# Patient Record
Sex: FEMALE | Race: WHITE | NOT HISPANIC OR LATINO | Employment: FULL TIME | URBAN - METROPOLITAN AREA
[De-identification: names, ages, dates, MRNs, and addresses within clinical notes are randomized per-mention and may not be internally consistent; named-entity substitution may affect disease eponyms.]

---

## 2017-07-07 ENCOUNTER — TRANSFERRED RECORDS (OUTPATIENT)
Dept: HEALTH INFORMATION MANAGEMENT | Facility: CLINIC | Age: 42
End: 2017-07-07

## 2017-09-05 ENCOUNTER — TRANSFERRED RECORDS (OUTPATIENT)
Dept: HEALTH INFORMATION MANAGEMENT | Facility: CLINIC | Age: 42
End: 2017-09-05

## 2017-09-12 ENCOUNTER — TRANSFERRED RECORDS (OUTPATIENT)
Dept: HEALTH INFORMATION MANAGEMENT | Facility: CLINIC | Age: 42
End: 2017-09-12

## 2017-10-05 ENCOUNTER — TELEPHONE (OUTPATIENT)
Dept: OPHTHALMOLOGY | Facility: CLINIC | Age: 42
End: 2017-10-05

## 2017-10-05 DIAGNOSIS — H53.2 DOUBLE VISION: Primary | ICD-10-CM

## 2017-10-06 ENCOUNTER — OFFICE VISIT (OUTPATIENT)
Dept: OPHTHALMOLOGY | Facility: CLINIC | Age: 42
End: 2017-10-06
Attending: OPHTHALMOLOGY
Payer: COMMERCIAL

## 2017-10-06 DIAGNOSIS — H53.2 DOUBLE VISION: ICD-10-CM

## 2017-10-06 DIAGNOSIS — H53.10 SUBJECTIVE VISUAL DISTURBANCE: Primary | ICD-10-CM

## 2017-10-06 DIAGNOSIS — G43.109 COMPLICATED MIGRAINE: ICD-10-CM

## 2017-10-06 PROCEDURE — 92015 DETERMINE REFRACTIVE STATE: CPT | Mod: ZF

## 2017-10-06 PROCEDURE — 99214 OFFICE O/P EST MOD 30 MIN: CPT

## 2017-10-06 RX ORDER — LISINOPRIL 10 MG/1
10 TABLET ORAL
COMMUNITY
Start: 2013-02-28 | End: 2022-11-10

## 2017-10-06 RX ORDER — BUSPIRONE HYDROCHLORIDE 15 MG/1
15 TABLET ORAL 3 TIMES DAILY
COMMUNITY
Start: 2013-11-05

## 2017-10-06 RX ORDER — FLUCONAZOLE 200 MG/1
200 TABLET ORAL
COMMUNITY
Start: 2017-04-13 | End: 2022-11-10

## 2017-10-06 RX ORDER — LAMOTRIGINE 100 MG/1
100 TABLET ORAL
COMMUNITY
Start: 2013-02-28 | End: 2020-01-28

## 2017-10-06 RX ORDER — LIDOCAINE 50 MG/G
1 PATCH TOPICAL DAILY PRN
COMMUNITY
Start: 2011-01-03

## 2017-10-06 RX ORDER — CYCLOBENZAPRINE HCL 10 MG
TABLET ORAL
COMMUNITY
Start: 2009-09-14 | End: 2020-01-28

## 2017-10-06 RX ORDER — METOPROLOL TARTRATE 25 MG/1
25 TABLET, FILM COATED ORAL 2 TIMES DAILY
COMMUNITY
Start: 2009-09-14

## 2017-10-06 ASSESSMENT — REFRACTION_MANIFEST
OS_AXIS: 032
OD_CYLINDER: +1.75
OD_SPHERE: -3.75
OD_AXIS: 135
OS_CYLINDER: +1.75
OS_SPHERE: -3.50

## 2017-10-06 ASSESSMENT — VISUAL ACUITY
METHOD: SNELLEN - LINEAR
CORRECTION_TYPE: GLASSES
OD_CC: 20/25
OS_CC: 20/20

## 2017-10-06 ASSESSMENT — REFRACTION_WEARINGRX
OS_CYLINDER: +2.00
OS_AXIS: 037
OD_AXIS: 144
OD_CYLINDER: +2.25
OD_SPHERE: -3.50
OS_SPHERE: -3.50

## 2017-10-06 ASSESSMENT — EXTERNAL EXAM - RIGHT EYE: OD_EXAM: NORMAL

## 2017-10-06 ASSESSMENT — CONF VISUAL FIELD
OD_NORMAL: 1
OS_NORMAL: 1

## 2017-10-06 ASSESSMENT — TONOMETRY
OS_IOP_MMHG: 8
IOP_METHOD: TONOPEN
OD_IOP_MMHG: 8

## 2017-10-06 ASSESSMENT — CUP TO DISC RATIO
OD_RATIO: 0.4
OS_RATIO: 0.4

## 2017-10-06 ASSESSMENT — SLIT LAMP EXAM - LIDS
COMMENTS: NORMAL
COMMENTS: NORMAL

## 2017-10-06 ASSESSMENT — EXTERNAL EXAM - LEFT EYE: OS_EXAM: NORMAL

## 2017-10-06 NOTE — MR AVS SNAPSHOT
After Visit Summary   10/6/2017    Leola Uriostegui    MRN: [id number]           Patient Information     Date Of Birth          1975        Visit Information        Provider Department      10/6/2017 9:00 AM Bhaskar Wheeler [facility]        Today's Diagnoses     Complicated migraine    -  1    Double vision           Follow-ups after your visit        Additional Services     NEUROLOGY ADULT REFERRAL       Your provider has referred you for the following:   Consult at [facility]: Neurology Clinic [facility] [phone]   [email or web address]  General Neurology    Please be aware that coverage of these services is subject to the terms and limitations of your health insurance plan.  Call member services at your health plan with any benefit or coverage questions.      Please bring the following with you to your appointment:    (1) Any X-Rays, CTs or MRIs which have been performed.  Contact the facility where they were done to arrange for  prior to your scheduled appointment.    (2) List of current medications  (3) This referral request   (4) Any documents/labs given to you for this referral                  Your next 10 appointments already scheduled     Oct 13, 2017  9:30 AM CDT   (Arrive by 9:15 AM)   New Patient Visit with BA Lowe [facility] Neurology ([facility])    [address] [place] [id number] [phone]              Future tests that were ordered for you today     Open Future Orders        Priority Expected Expires Ordered    Sensorimotor Routine  12/4/2017 10/5/2017            Who to contact     Please call your clinic at [phone] to:    Ask questions about your health    Make or cancel appointments    Discuss your medicines    Learn about your test results    Speak to your doctor   If you have compliments or concerns about an experience at your  clinic, or if you wish to file a complaint, please contact [facility] Patient Relations at [phone] or email us at [email or web address]         Additional Information About Your Visit        gopogo Information     gopogo is an electronic gateway that provides easy, online access to your medical records. With gopogo, you can request a clinic appointment, read your test results, renew a prescription or communicate with your care team.     To sign up for gopogo visit the website at [email or web address]   You will be asked to enter the access code listed below, as well as some personal information. Please follow the directions to create your username and password.     Your access code is: [email or web address]  Expires: 2017  6:30 AM     Your access code will  in 90 days. If you need help or a new code, please contact your [facility] or call [phone] for assistance.        Care EveryWhere ID     This is your Care EveryWhere ID. This could be used by other organizations to access your [facility] medical records  [id number]         Blood Pressure from Last 3 Encounters:   No data found for BP    Weight from Last 3 Encounters:   No data found for Wt              We Performed the Following     IOP Measurement     NEUROLOGY ADULT REFERRAL        Primary Care Provider Office Phone # Fax #    Vandana ANDRADE JASON Kearns [phone]       [facility] [place] [address]  [place] [address]        Equal Access to Services     [facility] SAGAR : Hadii aad ku hadasho Soomaali, waaxda luqadaha, qaybta kaalmada adeegyada, radha rosas . So [facility] [phone].    ATENCIÓN: Si habla español, tiene a villa disposición servicios gratuitos de asistencia lingüística. Llame al [phone].    We comply with applicable federal civil rights laws and [place] laws. We do not discriminate on the basis of  race, color, national origin, age, disability, sex, sexual orientation, or gender identity.            Thank you!     Thank you for choosing EYE CLINIC  for your care. Our goal is always to provide you with excellent care. Hearing back from our patients is one way we can continue to improve our services. Please take a few minutes to complete the written survey that you may receive in the mail after your visit with us. Thank you!             Your Updated Medication List - Protect others around you: Learn how to safely use, store and throw away your medicines at www.disposemymeds.org.          This list is accurate as of: 10/6/17 11:18 AM.  Always use your most recent med list.                   Brand Name Dispense Instructions for use Diagnosis    ALBUTEROL IN      Inhale 1-2 puffs into the lungs        busPIRone 15 MG tablet    BUSPAR     Take 15 mg by mouth        cyclobenzaprine 10 MG tablet    FLEXERIL          fluconazole 200 MG tablet    DIFLUCAN     Take 200 mg by mouth        lamoTRIgine 100 MG tablet    LaMICtal     Take 100 mg by mouth        lidocaine 5 % Patch    LIDODERM     Apply 1 patch topically        lisinopril 10 MG tablet    PRINIVIL/ZESTRIL     Take 10 mg by mouth        metoprolol 25 MG tablet    LOPRESSOR          MULTIVITAMIN ADULT PO           PREMPHASE Tabs per tablet   Generic drug:  conjugated estrogens-medroxyprogesteron

## 2017-10-06 NOTE — PROGRESS NOTES
"ATTENDING ASSESSMENT AND PLAN:       1. Atypical migraine visual aura  2. Complex migraines    Leola Uriostegui is a pleasant 42 year old White female with a history of bipolar disorder who presents to my neuro-ophthalmology clinic today for evaluation of migraines. She reports onset of symptoms in 2010. She denies ever having headache during these episodes but does report having blurred vision, balance problems, garbled speech, poor motor control, and dilated pupils in both eyes. She doesn't always remember the episodes. In 2010, she only had one episode of these symptoms which reportedly lasted all day. She reports having an MRI at that time which showed mild nonspecific T2 and FLAIR signal hyperintensity in white matter of both cerebral hemispheres. She was seen by a neurologist, who reportedly told her that the spots were normal for her age. She has a history of biopar disorder and the neurologist switched her Seroquel, Lamictal, and premphase from daytime to nighttime dosing, but the patient feels that this didn't help.    By 2012, the episodes had increased in frequency to about twice a month and were only brief episodes. She had another MRI which showed unchanged foci of T2/FLAIR signal hyperintensity in the periventricular and subcortical white matter of the frontal lobes bilaterally.     By 2016, she reports having five episodes in one month, which prompted her to present to neurology for re-evaluation. She had a third MRI in 2/2016 which showed stable few scattered patchy foci of T2 signal within the white matter. She was started on topamax in 3/2016 by her PCP. After starting the topamax, she had no further episodes until recently. She had a \"bad episode\" 5 weeks ago during which her symptoms reportedly lasted 2 days and a short episode 3 weeks ago lasting 20 minutes.     The episodes tend to start in the beginning of the day but she otherwise denies any known trigger. She does get photophobia with the " episodes. She typically does not have nausea, but she did have emesis during the episode 5 weeks ago. The vision gets blurry in both eyes and does not improve with closing one eye. She denies flashing lights or positive visual phenomena. She denies eye pain. The vision improves back to baseline after the episodes.     She denies any other medical problems aside from bipolar disorder. She denies any recent medication changes. She denies any other focal neurological symptoms between episodes.    She denies any family history of migraines, MS, or other neurological problems. There is a history of adult onset diabetes mellitus in her family.      Her past ocular history is significant for childhood strabismus s/p bilateral bilateral lateral rectus recession in 2007. Otherwise, she wears glasses and denies any other eye problems.     She is still taking Topamax 100 mg at bedtime. She no longer sees a neurologist as didn't feel they were helpful in the past. She has not had electroencephalography in past.     Best corrected visual acuity was 20/20 in both eyes, color vision was full in both eyes, and confrontational visual fields were full in both eyes. Pupils were large, round, and sluggishly reactive to light with no afferent pupillary defect. Motility was full and alignment was ortho. Slit lamp exam was unremarkable. Dilated fundus exam showed healthy-appearing optic nerves in both eyes. Cranial nerves V1-3, 7,8,9,11, and 12 were normal bilaterally.     MRI brain without contrast on 12/28/10 showed mild nonspecific T2 and FLAIR signal hyperintensity in white matter of both cerebral hemispheres. MRI brain without contrast on 11/19/12 showed few unchanged non-specific scattered foci of T2/FLAIR signal hyperintensity in the periventricular and subcortical white matter of the frontal lobes bilaterally. CT head without contrast on 4/25/15 showed no intracranial pathology. MRI brain without contrast on 2/26/16 showed stable  few scattered patchy foci of T2 signal within the white matter consistent with small-vessel ischemic changes or sequela of migraine headache. Carotid ultrasound on 5/14/16 showed no hemodynamically significant stenosis and vertebral arteries antegrade bilaterally.     Overall, the patient is a 42 year old female with history of bipolar disorder who presents for evaluation of transient episodes of neurological symptoms including blurred vision, balance problems, garbled speech, poor motor control, and dilated pupils. She often does not remember the episodes. She reports onset of rare episodes in 12/2010 and increased freqency of episodes up until 2/2016, when she was started on topamax. She estimates having a total of 15 episodes since onset. The topamax was helping until about 2 months ago, when the episodes returned. She has history of bipolar disorder for which she takes seroquil, lamictal, and premphase with no recent change in medications or dosing.  On my review of her 2016 MRI images these do not look typical of demyelinating disease and look more typical of microvascular changes or migraine.     Her symptoms are concerning for complex migraine vs seizure. Her description of the duration of the episodes makes seizure less likely. Her MRI appearance and history does not sound compatible with multiple sclerosis. Her symptoms are most consistent with complex migraine and dilated pupils are common in patients in the midst of migraine. She currently continues on topamax 100mg at bedtime.     The patient requested referral to general neurology for further evaluation and consideration of alternative pharmacologic migraine prophylaxis. Depending on their degree of concern for seizure, they may consider electroencephalography testing though seizure seems highly unlikely to me.    I did not make a follow-up appointment, but I would be happy to see the patient back in the future should any new neuro-ophthalmic concern  arise.       I spent a total of 60 minutes face to face with Leola Uriostegui during today's office visit.  Over 50% of this time was spent counseling the patient and/or coordinating care regarding her blurry vision episodes.    Complete documentation of historical and exam elements from today's encounter can be found in the full encounter summary report (not reduplicated in this progress note).  I personally obtained the chief complaint(s) and history of present illness.  I confirmed and edited as necessary the review of systems, past medical/surgical history, family history, social history, and examination findings as documented by others; and I examined the patient myself.  I personally reviewed the relevant tests, images, and reports as documented above.  I formulated and edited as necessary the assessment and plan and discussed the findings and management plan with the patient and family   Bhaskar Wheeler MD

## 2017-10-06 NOTE — NURSING NOTE
Chief Complaints and History of Present Illnesses   Patient presents with     Neurologic Problem     migraines     HPI    Symptoms:              Comments:  Leola is a 42 year old female presenting with a history of:    1. Migraines  - last migraine was 3 weeks ago. No timeline. Some last 5 minutes, others last two days.   - the migraine she had 5 weeks ago was severe enough to cause:         - blurred vision        - balance problems        - Speech issues         - dilated pupils        - but no pain in eyes    - no diplopia.   - brought discs for MRI.     Trung ANDERS 9:00 AM October 6, 2017

## 2017-10-10 NOTE — TELEPHONE ENCOUNTER
APPT INFORMATION    Date & Time: 10/13/17, 9:30AM   Reason for Appt: Migraines    Referring Name/Clinic: [facility]    Yes / No COMMENT / NOTES DATE & ACTION   Patient Contacted ?   yes  LVM asking pt to call back, need to know if there are records to gather. 10/10/17, LVM    yes Pt called back and she states she was seen at [facility], and [facility]. 10/10/17, emailed [facility]       RECORDS CLINIC NAME  (use N/A if no records ) DATE & ACTION RECEIVED RECS & IMG? Y/N   (may include other helpful notes)   External Clinics: [facility] 10/10/17, emailed [facility] to [email or web address]- [email or web address]  10/12/17- faxed [facility] Received     [facility] 10/10/17, emailed [facility]O to [email or web address]  10/12/17- faxed [facility] Received     [facility]  Faxed cover sheet, requesting imaging Received    Internal Clinics: [facility]

## 2017-10-12 NOTE — TELEPHONE ENCOUNTER
Records Received From:  [facility]      Date / Exam / Location   (*specify location only if different than the sending clinic) COMMENTS / MISSING  (be specific)   Office Notes:  4/28/15, 10/15/15, 5/13/16, 4/6/17, 7/7/17, 8/10/17, 9/5/17 9/27/17 office notes?   Radiology Reports:  (use exam date)  CT head brain 4/25/15  MR head brain 2/26/16  US carotid duplex bilateral 5/15/16  MR left shoulder 10/24/16  MR lumbar 10/31/16  CT facial bones 7/7/17 Ortho Appt: Was Isidra Dickinson Notified (Y/N) -    ED/Hospital Notes:  4/25/15    Other:  labs

## 2017-10-13 ENCOUNTER — PRE VISIT (OUTPATIENT)
Dept: NEUROLOGY | Facility: CLINIC | Age: 42
End: 2017-10-13

## 2017-10-16 NOTE — TELEPHONE ENCOUNTER
Records Received From:  [facility]     Date/Exam/Location  (specify location if different)   Office Notes:  12/11/15   ED/Hosp Notes:  6/30/15- [facility], 4/25/15- [facility]    Radiology Reports: CT head brain 4[facility]   Xray cervical 4/25/15- [facility]

## 2017-10-19 NOTE — TELEPHONE ENCOUNTER
Records Received From:  [facility]     Date/Exam/Location  (specify location if different)   Office Notes:  1/13/11

## 2019-10-02 ENCOUNTER — PRE VISIT (OUTPATIENT)
Dept: NEUROLOGY | Facility: CLINIC | Age: 44
End: 2019-10-02

## 2019-10-02 NOTE — TELEPHONE ENCOUNTER
FUTURE VISIT INFORMATION      FUTURE VISIT INFORMATION:    Date: 11/19/2019    Time: 1030AM    Location: [facility]  REFERRAL INFORMATION:    Referring provider:  Self    Referring providers clinic:      Reason for visit/diagnosis  Headaches     RECORDS REQUESTED FROM:       Clinic name Comments Records Status Imaging Status   [facility]  Care Everywhere N/A

## 2019-12-16 ENCOUNTER — HEALTH MAINTENANCE LETTER (OUTPATIENT)
Age: 44
End: 2019-12-16

## 2020-01-28 ENCOUNTER — OFFICE VISIT (OUTPATIENT)
Dept: NEUROLOGY | Facility: CLINIC | Age: 45
End: 2020-01-28
Payer: COMMERCIAL

## 2020-01-28 VITALS
SYSTOLIC BLOOD PRESSURE: 101 MMHG | DIASTOLIC BLOOD PRESSURE: 71 MMHG | OXYGEN SATURATION: 100 % | RESPIRATION RATE: 16 BRPM | HEART RATE: 77 BPM

## 2020-01-28 DIAGNOSIS — R46.89 SPELL OF ABNORMAL BEHAVIOR: ICD-10-CM

## 2020-01-28 DIAGNOSIS — G43.109 MIGRAINE WITH AURA AND WITHOUT STATUS MIGRAINOSUS, NOT INTRACTABLE: Primary | ICD-10-CM

## 2020-01-28 PROBLEM — G43.519 INTRACTABLE PERSISTENT MIGRAINE AURA WITHOUT CEREBRAL INFARCTION AND WITHOUT STATUS MIGRAINOSUS: Status: ACTIVE | Noted: 2019-10-07

## 2020-01-28 PROBLEM — R19.7 NAUSEA VOMITING AND DIARRHEA: Status: ACTIVE | Noted: 2019-08-05

## 2020-01-28 PROBLEM — R53.1 LEFT-SIDED WEAKNESS: Status: ACTIVE | Noted: 2018-03-14

## 2020-01-28 PROBLEM — R55 SYNCOPE: Status: ACTIVE | Noted: 2017-10-12

## 2020-01-28 PROBLEM — Z79.899 CONTROLLED SUBSTANCE AGREEMENT SIGNED: Status: ACTIVE | Noted: 2017-03-28

## 2020-01-28 PROBLEM — H53.8 BLURRING OF VISUAL IMAGE: Status: ACTIVE | Noted: 2018-03-08

## 2020-01-28 PROBLEM — M25.512 ACUTE PAIN OF LEFT SHOULDER: Status: ACTIVE | Noted: 2020-01-28

## 2020-01-28 PROBLEM — G43.B0 OPHTHALMOPLEGIC MIGRAINE: Status: ACTIVE | Noted: 2017-10-09

## 2020-01-28 PROBLEM — G89.29 CHRONIC PAIN: Status: ACTIVE | Noted: 2018-03-14

## 2020-01-28 PROBLEM — K43.0 INCISIONAL HERNIA WITH OBSTRUCTION BUT NO GANGRENE: Status: ACTIVE | Noted: 2020-01-28

## 2020-01-28 PROBLEM — Z82.49 FAMILY HISTORY OF ISCHEMIC HEART DISEASE: Status: ACTIVE | Noted: 2020-01-28

## 2020-01-28 PROBLEM — R19.7 DIARRHEA, UNSPECIFIED: Status: ACTIVE | Noted: 2019-08-11

## 2020-01-28 PROBLEM — R11.2 NAUSEA VOMITING AND DIARRHEA: Status: ACTIVE | Noted: 2019-08-05
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
## 2020-01-28 PROBLEM — K92.2 GASTROINTESTINAL HEMORRHAGE: Status: ACTIVE | Noted: 2020-01-28

## 2020-01-28 PROBLEM — M21.372 LEFT FOOT DROP: Status: ACTIVE | Noted: 2018-03-08

## 2020-01-28 RX ORDER — TOPIRAMATE 100 MG/1
100 TABLET, FILM COATED ORAL AT BEDTIME
Qty: 30 TABLET | Refills: 11 | Status: SHIPPED | OUTPATIENT
Start: 2020-01-28 | End: 2021-11-04

## 2020-01-28 RX ORDER — ATOMOXETINE 80 MG/1
80 CAPSULE ORAL DAILY
COMMUNITY
Start: 2020-01-06
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
## 2020-01-28 NOTE — NURSING NOTE
Chief Complaint   Patient presents with     Consult     P NEW CONSULTATION FOR NEUROLOGICAL SYMPTOMS, NO HEADACHES       Ken Uriostegui, EMT

## 2021-01-15 ENCOUNTER — HEALTH MAINTENANCE LETTER (OUTPATIENT)
Age: 46
End: 2021-01-15

## 2021-05-15 ENCOUNTER — HEALTH MAINTENANCE LETTER (OUTPATIENT)
Age: 46
End: 2021-05-15

## 2021-09-01 ENCOUNTER — TRANSCRIBE ORDERS (OUTPATIENT)
Dept: OTHER | Age: 46
End: 2021-09-01

## 2021-09-01 DIAGNOSIS — I26.99 PULMONARY EMBOLISM (H): Primary | ICD-10-CM

## 2021-09-04 ENCOUNTER — HEALTH MAINTENANCE LETTER (OUTPATIENT)
Age: 46
End: 2021-09-04

## 2021-09-15 PROCEDURE — 88305 TISSUE EXAM BY PATHOLOGIST: CPT | Performed by: PATHOLOGY

## 2021-09-16 ENCOUNTER — LAB REQUISITION (OUTPATIENT)
Dept: LAB | Facility: CLINIC | Age: 46
End: 2021-09-16

## 2021-09-17 LAB
PATH REPORT.COMMENTS IMP SPEC: NORMAL
PATH REPORT.COMMENTS IMP SPEC: NORMAL
PATH REPORT.FINAL DX SPEC: NORMAL
PATH REPORT.GROSS SPEC: NORMAL
PATH REPORT.MICROSCOPIC SPEC OTHER STN: NORMAL
PATH REPORT.RELEVANT HX SPEC: NORMAL
PHOTO IMAGE: NORMAL

## 2021-11-04 DIAGNOSIS — G43.109 MIGRAINE WITH AURA AND WITHOUT STATUS MIGRAINOSUS, NOT INTRACTABLE: ICD-10-CM

## 2021-11-04 RX ORDER — TOPIRAMATE 100 MG/1
100 TABLET, FILM COATED ORAL AT BEDTIME
Qty: 30 TABLET | Refills: 3 | Status: SHIPPED | OUTPATIENT
Start: 2021-11-04 | End: 2021-12-01

## 2021-11-22 ENCOUNTER — OFFICE VISIT (OUTPATIENT)
Dept: HEMATOLOGY | Facility: CLINIC | Age: 46
End: 2021-11-22
Attending: PHYSICIAN ASSISTANT
Payer: COMMERCIAL

## 2021-11-22 VITALS
HEART RATE: 86 BPM | RESPIRATION RATE: 14 BRPM | OXYGEN SATURATION: 100 % | DIASTOLIC BLOOD PRESSURE: 75 MMHG | HEIGHT: 62 IN | WEIGHT: 147.1 LBS | BODY MASS INDEX: 27.07 KG/M2 | SYSTOLIC BLOOD PRESSURE: 109 MMHG | TEMPERATURE: 97.6 F

## 2021-11-22 DIAGNOSIS — Z86.711 HISTORY OF PULMONARY EMBOLISM: Primary | ICD-10-CM

## 2021-11-22 DIAGNOSIS — Z90.49 S/P SMALL BOWEL RESECTION: ICD-10-CM

## 2021-11-22 DIAGNOSIS — Z79.899 ENCOUNTER FOR LONG-TERM (CURRENT) USE OF MEDICATIONS: ICD-10-CM

## 2021-11-22 PROCEDURE — G0463 HOSPITAL OUTPT CLINIC VISIT: HCPCS

## 2021-11-22 PROCEDURE — 99204 OFFICE O/P NEW MOD 45 MIN: CPT | Performed by: PHYSICIAN ASSISTANT

## 2021-11-22 ASSESSMENT — MIFFLIN-ST. JEOR: SCORE: 1265.49

## 2021-11-22 ASSESSMENT — PAIN SCALES - GENERAL: PAINLEVEL: NO PAIN (0)

## 2021-11-22 NOTE — PROGRESS NOTES
[facility]  [address] [place] [address] [address], [place] [address]  Main: [phone], Fax: [phone]    Patient seen at: [facility] at [address] [place] [address]    Outpatient Visit Note:    Patient: Leola Uriostegui  MRN: [id number]  : 1975  ALEXIS: 2021    Reason:  Recurrent pulmonary embolism. Left distal lower extremity DVT.     HPI:  Leola is a 46 year old female with a complex past medical history including bipolar disorder, opioid dependency, irritable bowel, S/P small bowel resection back in 2014, who is found to have a pulmonary embolism and left leg DVT back in 2021 and a pulmonary embolic event in Aug 2021 (one month after she was off anticoagulation therapy), referred by her primary care provider, Vandana Kearns CNP, for consultation.     Leola was admitted back on 2020 for nonspecific colitis. She was treated conservatively and was discharged on 2020.     Then on 2021, she experienced sudden onset of shortness of breath and presented to the emergency department at [facility] for evaluation. D-dimer on presentation was elevated at 1.99 and thus a CT chest was done showing small acute pulmonary embolus within the right posterior basal segment pulmonary artery. Lower extremity ultrasound showed intraluminal thrombus within the left posterior tibial vein within the mid calf. She was placed on rivaroxaban and was discharged on 2021. According to Leola, although she was prescribed Loestrin (estrogen for perimenopausal symptoms), she was not taking the medication at the time. She did, however, receive her first COVID-19 Pfizer vaccine back on 2021 and she thinks that her DVT/PE was related to that. Her second COVID-19 Pfizer vaccine was on 2/3/2021 where she developed a severe allergic reaction requiring epinephrine administration.     She has repeat venous ultrasound done in  2/3/2021 and 3/6/2021 which showed nonocclusive thrombus within the mid left peroneal vein.     Leola reports that she stayed on rivaroxaban until July 1st 2021.     Then on 8/18/2021, she presented to the emergency department once again with acute onset of shortness of breath and chest pain. CT chest at the time showed pulmonary emboli within left upper pulmonary arteries, right upper right lower lobe and right middle pulmonary arteries. Bilateral lower extremity ultrasound showed no evidence of DVT in both lower extremities. Restarted on rivaroxaban and was discharged on 8/19/2021. Limited hypercoagulable workup was done. Factor V Leiden and Prothrombin Gene mutation were negative. Cardiolipin Henrietta were negative. Beta 2 glycoprotein Henrietta, Lupus anticoagulant, AT III, Protein C level and Protein S levels were NOT done.     To complicate matters, back in Sept 2021, she was found to have elevation of her liver enzymes. At the time, this was thought to be related to rivaroxaban and thus rivaroxaban was discontinued and she was placed on apixaban.     Currently she is on apixaban at 5 mg PO BID dosing. She reports that her chest pain and shortness of breath have resolved. She denies any lower extremity swelling or pain. She also denies any bleeding issues. Denies any hematuria or blood in stools. No issues with epistaxis or oral mucosal bleeding.     ROS:  As above.     Medication:  Current Outpatient Medications   Medication     ALBUTEROL IN     atomoxetine (STRATTERA) 40 MG capsule     busPIRone (BUSPAR) 15 MG tablet     famotidine (PEPCID) 40 MG tablet     fluconazole (DIFLUCAN) 200 MG tablet     hyoscyamine (LEVSIN/SL) 0.125 MG sublingual tablet     lidocaine (LIDODERM) 5 % Patch     lisinopril (PRINIVIL/ZESTRIL) 10 MG tablet     lisinopril (PRINIVIL/ZESTRIL) 20 MG tablet     metoprolol (LOPRESSOR) 25 MG tablet     metoprolol succinate ER (TOPROL-XL) 50 MG 24 hr tablet     morphine (MSIR) 15 MG IR tablet     Multiple  Vitamins-Minerals (MULTIVITAMIN ADULT PO)     pravastatin (PRAVACHOL) 40 MG tablet     QUEtiapine (SEROQUEL) 200 MG tablet     QUEtiapine (SEROQUEL) 25 MG tablet     topiramate (TOPAMAX) 100 MG tablet     traZODone (DESYREL) 50 MG tablet     triamcinolone (KENALOG) 0.1 % external cream     No current facility-administered medications for this visit.     Allergies:     Allergies   Allergen Reactions     Cephalexin      PN: LW Reaction: Rash     Cephalosporins Diarrhea     Doxycycline      PN: LW Reaction: nausea     Hydromorphone      PN: LW Reaction: Rash     Nitrofurantoin Diarrhea     Oxycodone Itching     Oxycodone-Acetaminophen      PN: LW Reaction: Vision Changes     Sulfamethoxazole W/Trimethoprim Rash     Tape [Adhesive Tape] Rash       Past Medical History:   . Date     Abdominal pain, left lower quadrant     Abscessed tooth 08/2018     Allergic rhinitis, cause unspecified 8/10/2006   seasonal     Anemia     Backache, unspecified     Bipolar I disorder, most recent episode (or current) unspecified 8/10/2006     Bunion, right foot     Degenerative disc disease     Endometriosis, site unspecified     Esophageal reflux   controlled     Hyperlipidemia 6/2/2009     IBS (irritable bowel syndrome)     Intra-abdominal tumor     Migraine     Narcotic dependence     Neck pain 4/24/2009     Other and unspecified hyperlipidemia     Other specified disease of hair and hair follicles     Plantar fascial fibromatosis 8/10/2006     Postoperative nausea     Premature menopause     Pruritus     Spinal stenosis of cervical region 4/1/2011     Tachycardia     Tear of medial cartilage or meniscus of knee, current     Unspecified essential hypertension 8/10/2006     Unspecified venous (peripheral) insufficiency 1/18/2008     Past Surgical History:   . Laterality Date     (IA) AK ANES SURG LOWER IP ABDOMEN 9-08   mesh removed     (IA) AK ARTHRODESIS, EACH ADDL NECK SPINE FUSION 2009     (IA) AK ARTHROTOMY OPEN REPAIR MENISCUS 2010  "  left     (IA) IA REMOVAL TONSILS/ADENOIDS<12 YRS     adhesion removal 8/09     BICEPS TENDON REPAIR 1/14/16   left     BLADDER REPAIR     BUNIONECTOMY Left    foot surgery     CHOLECYSTECTOMY 1998     COLONOSCOPY DIAGNOSTIC 5/13   normal - required MAC anesthesia     ESOPHAGOGASTRODUODENOSCOPY 10/11     ESOPHAGOGASTRODUODENOSCOPY 6/30/16   GERD, pain     hernia remove 6-08   ventral hernia     HERNIA REPAIR 10/16/2019   incisional     hx ileal resection for Meckels October 2011     HYSTERECTOMY 10-8-2003     LAPAROSCOPIC SMALL BOWEL RESECTION 06/25/2014     LAPAROSCOPIC SMALL BOWEL RESECTION 07/23/2018   sutured anastomosis, no staples     LAPAROSCOPY ABDOMEN DIAGNOSTIC   x 15     leison removal and appy 4/09     OOPHORECTOMY 1992   right     IA ANESTH DIAGNOSTIC ARTHROSCOPIC PROC KNEE JOINT Right 05/01/2020   [facility]     IA UNLISTED PROCEDURE ORBIT 01/24/08   [facility]. strabismus surgery     SMALL BOWEL RESECTION 10/18/2011   with meckel's diverticulum     Social History:  Living home with her .     Family History:  Denies any family history of DVT/PE.   She has one son at 23 years of age who is a . No history of DVT/PE.     Objective:  Pleasant in no acute distress.  Vitals: /75 (BP Location: Right arm, Patient Position: Sitting, Cuff Size: Adult Regular)   Pulse 86   Temp 97.6  F (36.4  C) (Oral)   Resp 14   Ht 1.575 m (5' 2\")   Wt 66.7 kg (147 lb 1.6 oz)   SpO2 100%   BMI 26.90 kg/m    Exam:  Complete exam is not performed today.     Labs:  Thrombophilia workup result done at [facility] on 8/19/2021 were reviewed by this writer and are as described in the HPI section of this note.     Imaging:  Ultrasound and CT chest reports done at [facility] since Jan 2021 were all reviewed by this writer and are as described in the HPI section of this note.     Assessment:  In summary, Leola is a 46 year old female with a history of pulmonary embolism and left leg DVT in Jan 2021, who has a recurrent " pulmonary embolic event in Aug 2021 one month after she was off anticoagulation therapy, referred to our clinic for consultation by her primary care provider.     Her first pulmonary embolic event and DVT event in Jan 2021 was an unprovoked. Although it was thought that it might have caused by estrogen use, but in fact, the patient was never taken any estrogen based product at the time. This was also thought to have been provoked by the COVID-19 Pfizer vaccine at the time. However, there are currently no data to suggest the Pfizer COVID-19 vaccine caused an increase risk of venous thromboembolism unless if the patient had a severe reaction to the vaccine and became immobile from the reaction. Thus it is unlikely that the COVID-19 vaccine was the provoking factor for her pulmonary embolism.     Regardless, she had a second pulmonary embolic event in Aug 2021 for which it was also unprovoked.     Diagnosis:  1. Recurrent unprovoked pulmonary embolism in Jan 2021 and again in Aug 2021.   2. Left leg DVT in Jan 2021.     Plan:  Considering that this patient has 2 unprovoked pulmonary embolic events within an 8 months timeframe, it is my recommendation that she should stay on indefinite anticoagulation therapy for secondary pharmacological DVT/PE prophylaxis.     The question is which anticoagulation therapy is best for her. She apparently had elevation of her liver enzymes while on rivaroxaban and thus she is not a good candidate to be on rivaroxaban.     Apixaban generally absorbed through small intestine and some via colon. Leola had a history of small bowel resection and thus it is possible that she is not absorbing apixaban adequately. She has been on apixaban in the past 2 months though and her respiratory symptoms had improved.     My plan is as follow:  1. She needs long term anticoagulation therapy.   2. I will have her get an apixaban blood level done in the next week or so to ensure that she has adequate blood  level with apixaban.   3. Will also complete her inherit thrombophilia workup.   4. Recheck CBC and CMP.     She is instructed to call if she should have any unusual bleeding issues or if she should need any invasive or surgical procedures in the future. Otherwise, at this time, I am planning to see her back on an annual basis. I will contact her once the above labs are back.     66 minutes spent on the date of the encounter doing chart review, history and exam, documentation and further activities per the note.    Time IN: 14:01  Time OUT: 14:45         Neftali Ferris PA-C, MPAS  Physician Assistant  [facility].

## 2021-11-22 NOTE — PATIENT INSTRUCTIONS
[facility]  [facility]  [facility] [address], [place] [address]  Main: [phone], Fax: [phone]    It was a pleasure seeing you today.  Thank you for allowing us to be involved in your care.  Please let us know if there is anything else we can do for you, so that we can be sure you are leaving completely satisfied with your care experience.    Labs Wednesday at 10 at [facility]:  We will communicate these to you by Phone: [phone]. With your permission we may leave a detailed voicemail, please see below for our contact information should you have any questions about your results. Also, please note that some lab results may take a week or so to get back. Feel free to call or message if you have not heard back from us within 7-10 days.    Please stay on your blood thinner:  Eliquis  Please call us with any bleeding issues that you may have.     Patient Education & Resources:  For additional information, please see the following web links:  www.stoptheclot.org, www.clotconnect.org.    Call the [facility]  at [phone].     -If surgeries or procedures are planned (for holding instructions).        -Any new symptoms of DVT (deep vein thrombosis) or PE (pulmonary embolism)    -pain     -swelling     -redness    -warmth    -shortness of breath    -chest pain    -coughing up blood    We would like a provider on our team to see you at least annually for optimal care and to allow us to continue to prescribe for you.       Return to clinic in  One year.  Please schedule return appointment with Neftali Ferris PA-C .    Your nurse clinician is Jina Orona -[phone].   If they are unavailable and you have immediate concerns, please call [phone] and ask for a nurse.     Jina Gaston RN - Nurse Clinician - [facility] - [phone]                              [facility] [phone]    What is Apixaban (Eliquis  )? Eliquis   is a prescription medicine used to treat deep vein thrombosis (DVT) and pulmonary embolism, and to help reduce the risk of these conditions reoccurring.  It is also used to prevent clotting after knee & hip replacement.  It is an oral factor Xa inhibitor (it stops one of the clotting proteins). Your diet (or Vitamin K intake) does not affect this medication. This drug was FDA approved to treat DVT in 2014, but has been used in [place] since 2012 for atrial fibrillation.    How is Eliquis  usually dosed? For treatment for a new clot, Eliquis   is dosed 10mg twice daily for 7 days.  After that you take 5 mg twice daily. The dosing to prevent clots in your veins after surgery, long flights, or other high risk times: 2.5 mg twice daily  How do I know if I am taking the right dose? Everyone takes the same dose, no matter their weight.  It was not studied in obese patients.  Currently there is no lab test to check the Eliquis   blood level.  However, such testing is being developed and will likely be available in the future.  What are the possible side effects of Eliquis  ? The most common side effect of Eliquis   is bleeding (i.e. bleeding from gums, nosebleeds, heavier than normal menstrual bleeding, blood in urine or stool). This risk of bleeding is similar to other oral blood thinners.    Get emergency medical help if you have any of these signs of allergic reaction: hives; difficulty breathing; swelling of your face, lips, tongue, throat; or if you should experience any significant bleeding.   Other side effects include: headaches, feeling dizzy or weak  What if I have surgery or a procedure scheduled? Please call your doctor about holding this medication prior to surgeries, injections into your joints or spine, or other invasive procedures (i.e. endoscopy/colonoscopy if biopsy or polyp removal needed).  Please notify your dentist  that you are taking a blood thinner, although certain procedures may not require holding your Eliquis    Should I be worried that the reversal agent is not readily available?The reversal agent for Eliquis  , Andexxa, is not readily available. However, Eliquis wears off fairly quickly. If emergent surgery is required or life-threatening bleeding should occur, blood products and medications that promote clotting may be given.  Please get examined for potential bleeding after any trauma or head injury.  What should I tell my doctor before starting Eliquis  ? Please tell your doctor about past bleeding problems, liver or kidney problems, if you are pregnant or breastfeeding, or taking the following medications: mifepristone, certain antidepressants (including SSRIs such as fluoxetine, SNRIs such as venlafaxine), certain azole antifungals (such as itraconazole, ketoconazole, posaconazole), clarithromycin, conivaptan, HIV protease inhibitors (such as lopinavir, ritonavir), rifamycins (such as rifabutin), Dunbar's wort, or drugs used to treat seizures (such as carbamazepine, phenytoin). You should not take this medication if you are triple positive Antiphospholipid Syndrome.

## 2021-11-22 NOTE — LETTER
[facility]  [address] [place] [address] [address], [place] [address]  Main: [phone], Fax: [phone]    Patient seen at: [facility] at [address] [place] [address]    Outpatient Visit Note:    Patient: Leola Uriostegui  MRN: [id number]  : 1975  ALEXIS: 2021    Reason:  Recurrent pulmonary embolism. Left distal lower extremity DVT.     HPI:  Leola is a 46 year old female with a complex past medical history including bipolar disorder, opioid dependency, irritable bowel, S/P small bowel resection back in 2014, who is found to have a pulmonary embolism and left leg DVT back in 2021 and a pulmonary embolic event in Aug 2021 (one month after she was off anticoagulation therapy), referred by her primary care provider, Vandana Kearns CNP, for consultation.     Leola was admitted back on 2020 for nonspecific colitis. She was treated conservatively and was discharged on 2020.     Then on 2021, she experienced sudden onset of shortness of breath and presented to the emergency department at [facility] for evaluation. D-dimer on presentation was elevated at 1.99 and thus a CT chest was done showing small acute pulmonary embolus within the right posterior basal segment pulmonary artery. Lower extremity ultrasound showed intraluminal thrombus within the left posterior tibial vein within the mid calf. She was placed on rivaroxaban and was discharged on 2021. According to Leola, although she was prescribed Loestrin (estrogen for perimenopausal symptoms), she was not taking the medication at the time. She did, however, receive her first COVID-19 Pfizer vaccine back on 2021 and she thinks that her DVT/PE was related to that. Her second COVID-19 Pfizer vaccine was on 2/3/2021 where she developed a severe allergic reaction requiring epinephrine administration.     She has repeat venous ultrasound done in  2/3/2021 and 3/6/2021 which showed nonocclusive thrombus within the mid left peroneal vein.     Leola reports that she stayed on rivaroxaban until July 1st 2021.     Then on 8/18/2021, she presented to the emergency department once again with acute onset of shortness of breath and chest pain. CT chest at the time showed pulmonary emboli within left upper pulmonary arteries, right upper right lower lobe and right middle pulmonary arteries. Bilateral lower extremity ultrasound showed no evidence of DVT in both lower extremities. Restarted on rivaroxaban and was discharged on 8/19/2021. Limited hypercoagulable workup was done. Factor V Leiden and Prothrombin Gene mutation were negative. Cardiolipin Henrietta were negative. Beta 2 glycoprotein Henrietta, Lupus anticoagulant, AT III, Protein C level and Protein S levels were NOT done.     To complicate matters, back in Sept 2021, she was found to have elevation of her liver enzymes. At the time, this was thought to be related to rivaroxaban and thus rivaroxaban was discontinued and she was placed on apixaban.     Currently she is on apixaban at 5 mg PO BID dosing. She reports that her chest pain and shortness of breath have resolved. She denies any lower extremity swelling or pain. She also denies any bleeding issues. Denies any hematuria or blood in stools. No issues with epistaxis or oral mucosal bleeding.     ROS:  As above.     Medication:  Current Outpatient Medications   Medication     ALBUTEROL IN     atomoxetine (STRATTERA) 40 MG capsule     busPIRone (BUSPAR) 15 MG tablet     famotidine (PEPCID) 40 MG tablet     fluconazole (DIFLUCAN) 200 MG tablet     hyoscyamine (LEVSIN/SL) 0.125 MG sublingual tablet     lidocaine (LIDODERM) 5 % Patch     lisinopril (PRINIVIL/ZESTRIL) 10 MG tablet     lisinopril (PRINIVIL/ZESTRIL) 20 MG tablet     metoprolol (LOPRESSOR) 25 MG tablet     metoprolol succinate ER (TOPROL-XL) 50 MG 24 hr tablet     morphine (MSIR) 15 MG IR tablet     Multiple  Vitamins-Minerals (MULTIVITAMIN ADULT PO)     pravastatin (PRAVACHOL) 40 MG tablet     QUEtiapine (SEROQUEL) 200 MG tablet     QUEtiapine (SEROQUEL) 25 MG tablet     topiramate (TOPAMAX) 100 MG tablet     traZODone (DESYREL) 50 MG tablet     triamcinolone (KENALOG) 0.1 % external cream     No current facility-administered medications for this visit.     Allergies:     Allergies   Allergen Reactions     Cephalexin      PN: LW Reaction: Rash     Cephalosporins Diarrhea     Doxycycline      PN: LW Reaction: nausea     Hydromorphone      PN: LW Reaction: Rash     Nitrofurantoin Diarrhea     Oxycodone Itching     Oxycodone-Acetaminophen      PN: LW Reaction: Vision Changes     Sulfamethoxazole W/Trimethoprim Rash     Tape [Adhesive Tape] Rash       Past Medical History:   . Date     Abdominal pain, left lower quadrant     Abscessed tooth 08/2018     Allergic rhinitis, cause unspecified 8/10/2006   seasonal     Anemia     Backache, unspecified     Bipolar I disorder, most recent episode (or current) unspecified 8/10/2006     Bunion, right foot     Degenerative disc disease     Endometriosis, site unspecified     Esophageal reflux   controlled     Hyperlipidemia 6/2/2009     IBS (irritable bowel syndrome)     Intra-abdominal tumor     Migraine     Narcotic dependence     Neck pain 4/24/2009     Other and unspecified hyperlipidemia     Other specified disease of hair and hair follicles     Plantar fascial fibromatosis 8/10/2006     Postoperative nausea     Premature menopause     Pruritus     Spinal stenosis of cervical region 4/1/2011     Tachycardia     Tear of medial cartilage or meniscus of knee, current     Unspecified essential hypertension 8/10/2006     Unspecified venous (peripheral) insufficiency 1/18/2008     Past Surgical History:   . Laterality Date     (IA) [place] ANES SURG LOWER IP ABDOMEN 9-08   mesh removed     (IA) [place] ARTHRODESIS, EACH ADDL NECK SPINE FUSION 2009     (IA) [place] ARTHROTOMY OPEN REPAIR MENISCUS 2010  "  left     (IA) [place] REMOVAL TONSILS/ADENOIDS<12 YRS     adhesion removal 8/09     BICEPS TENDON REPAIR 1/14/16   left     BLADDER REPAIR     BUNIONECTOMY Left    foot surgery     CHOLECYSTECTOMY 1998     COLONOSCOPY DIAGNOSTIC 5/13   normal - required MAC anesthesia     ESOPHAGOGASTRODUODENOSCOPY 10/11     ESOPHAGOGASTRODUODENOSCOPY 6/30/16   GERD, pain     hernia remove 6-08   ventral hernia     HERNIA REPAIR 10/16/2019   incisional     hx ileal resection for Meckels October 2011     HYSTERECTOMY 10-8-2003     LAPAROSCOPIC SMALL BOWEL RESECTION 06/25/2014     LAPAROSCOPIC SMALL BOWEL RESECTION 07/23/2018   sutured anastomosis, no staples     LAPAROSCOPY ABDOMEN DIAGNOSTIC   x 15     leison removal and appy 4/09     OOPHORECTOMY 1992   right     SD ANESTH DIAGNOSTIC ARTHROSCOPIC PROC KNEE JOINT Right 05/01/2020   [facility]     SD UNLISTED PROCEDURE ORBIT 01/24/08   keeley. strabismus surgery     SMALL BOWEL RESECTION 10/18/2011   with meckel's diverticulum     Social History:  Living home with her .     Family History:  Denies any family history of DVT/PE.   She has one son at 23 years of age who is a . No history of DVT/PE.     Objective:  Pleasant in no acute distress.  Vitals: /75 (BP Location: Right arm, Patient Position: Sitting, Cuff Size: Adult Regular)   Pulse 86   Temp 97.6  F (36.4  C) (Oral)   Resp 14   Ht 1.575 m (5' 2\")   Wt 66.7 kg (147 lb 1.6 oz)   SpO2 100%   BMI 26.90 kg/m    Exam:  Complete exam is not performed today.     Labs:  Thrombophilia workup result done at [facility] on 8/19/2021 were reviewed by this writer and are as described in the HPI section of this note.     Imaging:  Ultrasound and CT chest reports done at [facility] since Jan 2021 were all reviewed by this writer and are as described in the HPI section of this note.     Assessment:  In summary, Leola is a 46 year old female with a history of pulmonary embolism and left leg DVT in Jan 2021, who has a recurrent " pulmonary embolic event in Aug 2021 one month after she was off anticoagulation therapy, referred to our clinic for consultation by her primary care provider.     Her first pulmonary embolic event and DVT event in Jan 2021 was an unprovoked. Although it was thought that it might have caused by estrogen use, but in fact, the patient was never taken any estrogen based product at the time. This was also thought to have been provoked by the COVID-19 Pfizer vaccine at the time. However, there are currently no data to suggest the Pfizer COVID-19 vaccine caused an increase risk of venous thromboembolism unless if the patient had a severe reaction to the vaccine and became immobile from the reaction. Thus it is unlikely that the COVID-19 vaccine was the provoking factor for her pulmonary embolism.     Regardless, she had a second pulmonary embolic event in Aug 2021 for which it was also unprovoked.     Diagnosis:  1. Recurrent unprovoked pulmonary embolism in Jan 2021 and again in Aug 2021.   2. Left leg DVT in Jan 2021.     Plan:  Considering that this patient has 2 unprovoked pulmonary embolic events within an 8 months timeframe, it is my recommendation that she should stay on indefinite anticoagulation therapy for secondary pharmacological DVT/PE prophylaxis.     The question is which anticoagulation therapy is best for her. She apparently had elevation of her liver enzymes while on rivaroxaban and thus she is not a good candidate to be on rivaroxaban.     Apixaban generally absorbed through small intestine and some via colon. Leola had a history of small bowel resection and thus it is possible that she is not absorbing apixaban adequately. She has been on apixaban in the past 2 months though and her respiratory symptoms had improved.     My plan is as follow:  1. She needs long term anticoagulation therapy.   2. I will have her get an apixaban blood level done in the next week or so to ensure that she has adequate blood  level with apixaban.   3. Will also complete her inherit thrombophilia workup.   4. Recheck CBC and CMP.     She is instructed to call if she should have any unusual bleeding issues or if she should need any invasive or surgical procedures in the future. Otherwise, at this time, I am planning to see her back on an annual basis. I will contact her once the above labs are back.     66 minutes spent on the date of the encounter doing chart review, history and exam, documentation and further activities per the note.    Time IN: 14:01  Time OUT: 14:45         Neftali Ferris PA-C, MPAS  Physician Assistant  [facility].

## 2021-11-22 NOTE — NURSING NOTE
Leola Uriostegui is here for a new consult visit and is  being seen for recurrent PE    Vital signs were taken and assessed.  Allergies and medications were reviewed and updated by [facility] staff.    I introduced myself and my role and provided Leola with a contact card containing our information.    Educated patient about the signs, symptoms of and risk factors for venous thrombosis (VTE) and provided an educational  book dandre, which reviews these facts. This includes the following web links  for further information:  www.stoptheclot.org, www.clotconnect.org.    The follow up plan was developed and we reviewed this plan point by point and all questions answered.  The patient verbalized understanding of  and agreement with plan.  The AVS instructions were then printed and given to the patient.     I  thanked them for coming and encouraged them to call with any concerns or questions.    Jina Gaston, RN - Nurse Clinician - [facility] - [phone]

## 2021-11-24 ENCOUNTER — LAB (OUTPATIENT)
Dept: LAB | Facility: CLINIC | Age: 46
End: 2021-11-24
Payer: COMMERCIAL

## 2021-11-24 DIAGNOSIS — Z86.711 HISTORY OF PULMONARY EMBOLISM: ICD-10-CM

## 2021-11-24 DIAGNOSIS — Z90.49 S/P SMALL BOWEL RESECTION: ICD-10-CM

## 2021-11-24 DIAGNOSIS — Z79.899 ENCOUNTER FOR LONG-TERM (CURRENT) USE OF MEDICATIONS: ICD-10-CM

## 2021-11-24 LAB
ALBUMIN SERPL-MCNC: 3.9 G/DL (ref 3.4–5)
ALP SERPL-CCNC: 79 U/L (ref 40–150)
ALT SERPL W P-5'-P-CCNC: 97 U/L (ref 0–50)
ANION GAP SERPL CALCULATED.3IONS-SCNC: 6 MMOL/L (ref 3–14)
AST SERPL W P-5'-P-CCNC: 51 U/L (ref 0–45)
BILIRUB SERPL-MCNC: 0.5 MG/DL (ref 0.2–1.3)
BUN SERPL-MCNC: 15 MG/DL (ref 7–30)
CALCIUM SERPL-MCNC: 9.2 MG/DL (ref 8.5–10.1)
CHLORIDE BLD-SCNC: 111 MMOL/L (ref 94–109)
CO2 SERPL-SCNC: 24 MMOL/L (ref 20–32)
CREAT SERPL-MCNC: 0.88 MG/DL (ref 0.52–1.04)
ERYTHROCYTE [DISTWIDTH] IN BLOOD BY AUTOMATED COUNT: 14.2 % (ref 10–15)
GFR SERPL CREATININE-BSD FRML MDRD: 80 ML/MIN/1.73M2
GLUCOSE BLD-MCNC: 89 MG/DL (ref 70–99)
HCT VFR BLD AUTO: 38.8 % (ref 35–47)
HGB BLD-MCNC: 13 G/DL (ref 11.7–15.7)
MCH RBC QN AUTO: 30.1 PG (ref 26.5–33)
MCHC RBC AUTO-ENTMCNC: 33.5 G/DL (ref 31.5–36.5)
MCV RBC AUTO: 90 FL (ref 78–100)
PLATELET # BLD AUTO: 277 10E3/UL (ref 150–450)
POTASSIUM BLD-SCNC: 3.5 MMOL/L (ref 3.4–5.3)
PROT SERPL-MCNC: 7.6 G/DL (ref 6.8–8.8)
RBC # BLD AUTO: 4.32 10E6/UL (ref 3.8–5.2)
SODIUM SERPL-SCNC: 141 MMOL/L (ref 133–144)
WBC # BLD AUTO: 4 10E3/UL (ref 4–11)

## 2021-11-24 PROCEDURE — 85306 CLOT INHIBIT PROT S FREE: CPT

## 2021-11-24 PROCEDURE — 80299 QUANTITATIVE ASSAY DRUG: CPT

## 2021-11-24 PROCEDURE — 86146 BETA-2 GLYCOPROTEIN ANTIBODY: CPT

## 2021-11-24 PROCEDURE — 80053 COMPREHEN METABOLIC PANEL: CPT

## 2021-11-24 PROCEDURE — 85300 ANTITHROMBIN III ACTIVITY: CPT

## 2021-11-24 PROCEDURE — 85303 CLOT INHIBIT PROT C ACTIVITY: CPT

## 2021-11-24 PROCEDURE — 36415 COLL VENOUS BLD VENIPUNCTURE: CPT

## 2021-11-24 PROCEDURE — 85027 COMPLETE CBC AUTOMATED: CPT

## 2021-11-26 LAB
APIXABAN PPP CHRO-MCNC: 251 NG/ML
AT III ACT/NOR PPP CHRO: 100 % (ref 85–135)
B2 GLYCOPROT1 IGG SERPL IA-ACNC: 7.7 U/ML
B2 GLYCOPROT1 IGM SERPL IA-ACNC: <2.4 U/ML
PROT C ACT/NOR PPP CHRO: 86 % (ref 70–170)
PROT S FREE AG ACT/NOR PPP IA: 90 % (ref 55–125)

## 2021-11-30 ENCOUNTER — TELEPHONE (OUTPATIENT)
Dept: HEMATOLOGY | Facility: CLINIC | Age: 46
End: 2021-11-30
Payer: COMMERCIAL

## 2021-11-30 DIAGNOSIS — Z86.711 HISTORY OF PULMONARY EMBOLISM: Primary | ICD-10-CM

## 2021-11-30 DIAGNOSIS — Z79.899 ENCOUNTER FOR LONG-TERM (CURRENT) USE OF MEDICATIONS: ICD-10-CM

## 2021-11-30 NOTE — TELEPHONE ENCOUNTER
[facility]  [facility]  [address] [place] [address], [address], [place] [address]  Main: [phone], Fax: [phone]    Telephone Note:     Patient: Leola Uriostegui  MRN: [id number]  : 1975  Date of this note written: 2021    Called patient on 2021 at 09:00am to discuss lab results. I explain to her that her LFT's are stable. Her apixaban level is within therapeutic range. CBC is normal. Beta 2 Glycoprotein IgG Henrietta is slightly elevated at 7.7 which is clinically NOT significant. Remaining inherit thrombophilia workup are negative.     Plan is to continue with apixaban at 5 mg PO Q 12 hours dosing as her mainstay of anticoagulation therapy for secondary pharmacological DVT/PE prophylaxis. Will plan to repeat her hepatic function in  6 months. I do not belief that her elevation of LFT's are related to apixaban. Order for hepatic panel is placed and she is instructed to make appointment at a [facility] lab to have this done in 6 months. Return to clinic for follow up visit with me in one year.     Component      Latest Ref Rng & Units 2021   Sodium      133 - 144 mmol/L 141   Potassium      3.4 - 5.3 mmol/L 3.5   Chloride      94 - 109 mmol/L 111 (H)   Carbon Dioxide      20 - 32 mmol/L 24   Anion Gap      3 - 14 mmol/L 6   Urea Nitrogen      7 - 30 mg/dL 15   Creatinine      0.52 - 1.04 mg/dL 0.88   Calcium      8.5 - 10.1 mg/dL 9.2   Glucose      70 - 99 mg/dL 89   Alkaline Phosphatase      40 - 150 U/L 79   AST      0 - 45 U/L 51 (H)   ALT      0 - 50 U/L 97 (H)   Protein Total      6.8 - 8.8 g/dL 7.6   Albumin      3.4 - 5.0 g/dL 3.9   Bilirubin Total      0.2 - 1.3 mg/dL 0.5   GFR Estimate      >60 mL/min/1.73m2 80   WBC      4.0 - 11.0 10e3/uL 4.0   RBC Count      3.80 - 5.20 10e6/uL 4.32   Hemoglobin      11.7 - 15.7 g/dL 13.0   Hematocrit      35.0 - 47.0 % 38.8   MCV      78 - 100 fL 90   MCH      26.5 - 33.0 pg 30.1    MCHC      31.5 - 36.5 g/dL 33.5   RDW      10.0 - 15.0 % 14.2   Platelet Count      150 - 450 10e3/uL 277   Apixaban      ng/mL 251   Antithrombin III Chromogenic      85 - 135 % 100   Beta 2 Glycoprotein 1 Antibody IgG      <7.0 U/mL 7.7 (H)   Beta 2 Glycoprotein 1 Antibody IgM      <7.0 U/mL <2.4   Prot C Chromogenic      70 - 170 % 86   Protein S Antigen Free      55 - 125 % 90       Neftali Ferris PA-C, MPAS  Physician Assistant  [facility].

## 2021-12-01 ENCOUNTER — VIRTUAL VISIT (OUTPATIENT)
Dept: NEUROLOGY | Facility: CLINIC | Age: 46
End: 2021-12-01
Payer: COMMERCIAL

## 2021-12-01 DIAGNOSIS — G43.109 MIGRAINE WITH AURA AND WITHOUT STATUS MIGRAINOSUS, NOT INTRACTABLE: ICD-10-CM

## 2021-12-01 PROCEDURE — 99213 OFFICE O/P EST LOW 20 MIN: CPT | Mod: 95 | Performed by: PSYCHIATRY & NEUROLOGY

## 2021-12-01 RX ORDER — TOPIRAMATE 100 MG/1
100 TABLET, FILM COATED ORAL AT BEDTIME
Qty: 30 TABLET | Refills: 11 | Status: SHIPPED | OUTPATIENT
Start: 2021-12-01 | End: 2022-12-22

## 2021-12-01 ASSESSMENT — HEADACHE IMPACT TEST (HIT 6)
HIT6 TOTAL SCORE: 45
WHEN YOU HAVE A HEADACHE HOW OFTEN DO YOU WISH YOU COULD LIE DOWN: ALWAYS
HOW OFTEN HAVE YOU FELT TOO TIRED TO WORK BECAUSE OF YOUR HEADACHES: NEVER
HOW OFTEN HAVE YOU FELT FED UP OR IRRITATED BECAUSE OF YOUR HEADACHES: NEVER
WHEN YOU HAVE A HEADACHE HOW OFTEN DO YOU WISH YOU COULD LIE DOWN: ALWAYS
HOW OFTEN DO HEADACHES LIMIT YOUR DAILY ACTIVITIES: RARELY
HOW OFTEN DID HEADACHS LIMIT CONCENTRATION ON WORK OR DAILY ACTIVITY: NEVER
HOW OFTEN HAVE YOU FELT FED UP OR IRRITATED BECAUSE OF YOUR HEADACHES: NEVER
HOW OFTEN DO HEADACHES LIMIT YOUR DAILY ACTIVITIES: RARELY
HOW OFTEN DID HEADACHS LIMIT CONCENTRATION ON WORK OR DAILY ACTIVITY: NEVER
WHEN YOU HAVE HEADACHES HOW OFTEN IS THE PAIN SEVERE: NEVER
HIT6 TOTAL SCORE: 45
HOW OFTEN HAVE YOU FELT TOO TIRED TO WORK BECAUSE OF YOUR HEADACHES: NEVER
WHEN YOU HAVE HEADACHES HOW OFTEN IS THE PAIN SEVERE: NEVER

## 2021-12-01 NOTE — PROGRESS NOTES
Leola is a 46 year old who is being evaluated via a billable video visit.      How would you like to obtain your AVS? MyChart  If the video visit is dropped, the invitation should be resent by: Text to cell phone: [phone]  Will anyone else be joining your video visit? No      Video Start Time: 9:04 AM  Video-Visit Details    Type of service:  Video Visit    Video End Time:9:13 AM    Originating Location (pt. Location): Home    Distant Location (provider location):  [facility] [place]     Platform used for Video Visit: Travis      MIGRAINE DISABILITY ASSESSMENT (MIDAS)    On how many days in the last 3 months did you miss work or school because of your headaches?  1    How many days in the last 3 months was your productivity at work or school reduced by half or more because of your headaches? (Do not include days you counted in question 1 where you missed work or school.)  0    On how many days in the last 3 months did you not do household work (such as housework, home repairs and maintenance, shopping, caring for children and relatives) because of your headaches?  1    How many days in the last 3 months was your productivity in household work reduced by half or more because of your headaches? (Do not include days you counted in question 3 where you did not do household work).  0    On how many days in the last 3 months did you miss family, social, or lesiure activities because of your headaches?  1    MIDAS Total Score:     On how many days in the last 3 months did you have a headache? (If a headache lasted more than 1 day, count each day.)   1    On a scale of 0 - 10, on average how painful were these headaches (where 0 = no pain at all, and 10 = pain as bad as it can be.  1    [facility]  Neurology Progress Note    Subjective:    Ms. Uriostegui returns for follow up of intermittent neurological symptoms, which have responded  to topiramate 100 g nightly.  I last saw her in January 2020.  At that time, we discussed continuing topiramate and possibly pursuing an updated EEG to evaluate for seizure potential.  She did not choose to pursue the EEG.    Since I last saw her, she reports multiple PE's and subsequent liver damage with Xarelto. She switched to Eliquis.    As for her neurologic symptoms, she had one attack since I last saw her almost 2 years ago, on September 19th; it lasted all day and was associated speech difficulty, incoordinated, tired, and dropping things, walking to her right side. It had resolved the next day. She stayed in bed most of the day.     She reminds me that she previously tried Reglan and Aleve for these without success.    She continues to take topiramate 100 mg at night. She denies side effects.    Objective:    Vitals: There were no vitals taken for this visit.  General: Cooperative, NAD  Neurologic:  Mental Status: Fully alert, attentive and oriented. Speech clear and fluent.   Cranial Nerves: Facial movements symmetric.   Motor: No abnormal movements.      Assessment/Plan:   Leola Uriostegui is a 46-year-old woman who returns for follow-up of intermittent neurologic symptoms, with response to topiramate 100 mg nightly.  We discussed that while not typical, if her symptoms are related to migraine attacks, we will plan to continue topiramate 100 mg nightly for prevention.    She has done very well since I last saw her, with only 1 attack in the past 2 years.  She is tolerating topiramate well and we will continue this.    I will plan to see her back in 1 year, or sooner if needed.    We reviewed that acutely, we would expect that her attacks would occur in the same way each time, as they have previously, and that I would recommend further evaluation if her attacks change or become more frequent.    During attacks, she will continue to rest as needed.  She does not drive during attacks or do other activities  where there could be harm.    Kacy Calloway MD  Neurology

## 2021-12-01 NOTE — LETTER
12/1/2021       RE: Leola Uriostegui  [address] [facility] [id number]     Dear Colleague,    Thank you for referring your patient, Leola Uriostegui, to the [facility] [place] at [facility]. Please see a copy of my visit note below.    MIGRAINE DISABILITY ASSESSMENT (MIDAS)    On how many days in the last 3 months did you miss work or school because of your headaches?  1    How many days in the last 3 months was your productivity at work or school reduced by half or more because of your headaches? (Do not include days you counted in question 1 where you missed work or school.)  0    On how many days in the last 3 months did you not do household work (such as housework, home repairs and maintenance, shopping, caring for children and relatives) because of your headaches?  1    How many days in the last 3 months was your productivity in household work reduced by half or more because of your headaches? (Do not include days you counted in question 3 where you did not do household work).  0    On how many days in the last 3 months did you miss family, social, or lesiure activities because of your headaches?  1    MIDAS Total Score:     On how many days in the last 3 months did you have a headache? (If a headache lasted more than 1 day, count each day.)   1    On a scale of 0 - 10, on average how painful were these headaches (where 0 = no pain at all, and 10 = pain as bad as it can be.  1    [facility]  Neurology Progress Note    Subjective:    Ms. Uriostegui returns for follow up of intermittent neurological symptoms, which have responded to topiramate 100 g nightly.  I last saw her in January 2020.  At that time, we discussed continuing topiramate and possibly pursuing an updated EEG to evaluate for seizure potential.  She did not choose to pursue the EEG.    Since I last saw  her, she reports multiple PE's and subsequent liver damage with Xarelto. She switched to Eliquis.    As for her neurologic symptoms, she had one attack since I last saw her almost 2 years ago, on September 19th; it lasted all day and was associated speech difficulty, incoordinated, tired, and dropping things, walking to her right side. It had resolved the next day. She stayed in bed most of the day.     She reminds me that she previously tried Reglan and Aleve for these without success.    She continues to take topiramate 100 mg at night. She denies side effects.    Objective:    Vitals: There were no vitals taken for this visit.  General: Cooperative, NAD  Neurologic:  Mental Status: Fully alert, attentive and oriented. Speech clear and fluent.   Cranial Nerves: Facial movements symmetric.   Motor: No abnormal movements.      Assessment/Plan:   Leola Uriostegui is a 46-year-old woman who returns for follow-up of intermittent neurologic symptoms, with response to topiramate 100 mg nightly.  We discussed that while not typical, if her symptoms are related to migraine attacks, we will plan to continue topiramate 100 mg nightly for prevention.    She has done very well since I last saw her, with only 1 attack in the past 2 years.  She is tolerating topiramate well and we will continue this.    I will plan to see her back in 1 year, or sooner if needed.    We reviewed that acutely, we would expect that her attacks would occur in the same way each time, as they have previously, and that I would recommend further evaluation if her attacks change or become more frequent.    During attacks, she will continue to rest as needed.  She does not drive during attacks or do other activities where there could be harm.    Kacy Calloway MD  Neurology         Again, thank you for allowing me to participate in the care of your patient.      Sincerely,    Kacy Calloway MD

## 2022-02-19 ENCOUNTER — HEALTH MAINTENANCE LETTER (OUTPATIENT)
Age: 47
End: 2022-02-19

## 2022-05-04 ENCOUNTER — MYC MEDICAL ADVICE (OUTPATIENT)
Dept: HEMATOLOGY | Facility: CLINIC | Age: 47
End: 2022-05-04
Payer: COMMERCIAL

## 2022-05-06 NOTE — TELEPHONE ENCOUNTER
Leola Uriostegui is a 47yr old female seen by the [facility] for hx of VTE.     Call placed to Leola to follow up on her mychart questions about duration of AC holding pre procedure and switching to Lovenox.      Explained to Leola that the 3 day hold for a spine procedure and 2 day hold for a knee procedure is per the preference of those doctors, from a hematological standpoint either is fine. Per Neftali Ferris PA-C, if the patient wants to discuss using Lovenox for anticoagulation, she would need to make an appointment to discuss that.  Patient reiterated concerns that the Eliquis she's currently on is damaging her veins and causing blood draws to be unsuccessful. Patient states that since it's the only medication she takes related to her blood, it must be causing it. Staff encouraged to make annual appointment with [facility] to discuss these things. Patient also informed that they are due this month for their 6 month labs that include checking liver enzymes. Patient verbalized understanding and states they'll go to a walk in lab.     Patient denies additional concerns or questions at this time, will continue to keep [facility] updated on the dates of their scheduled procedures and outcomes.    Mirella THAYERN, RN, PHN   [facility]   Office: [phone]  Fax: [phone]

## 2022-06-11 ENCOUNTER — HEALTH MAINTENANCE LETTER (OUTPATIENT)
Age: 47
End: 2022-06-11

## 2022-10-21 ENCOUNTER — TELEPHONE (OUTPATIENT)
Dept: HEMATOLOGY | Facility: CLINIC | Age: 47
End: 2022-10-21

## 2022-10-21 NOTE — TELEPHONE ENCOUNTER
Leola Uriostegui  [id number]  1975  Hx of PE; taking Apixaban    Leola S Anderson called and stated that she had a spontaneous nosebleed at 10 pm last night that lasted 5 minutes. She passed a santo sized clot. She also mentioned that she had noticed a sensation in her throat for the past several weeks and after the nosebleed, that disappeared. She wondered if she was feeling a clot in her throat.     I told her that the back of her nose is drains to the back of her throat. I told her I would forward her message to JESUS Carrasquillo.  Shannon Chairez, MSN, RN, PHN -Nurse Clinician, [facility] [phone]

## 2022-10-22 ENCOUNTER — HEALTH MAINTENANCE LETTER (OUTPATIENT)
Age: 47
End: 2022-10-22

## 2022-11-10 RX ORDER — HYDROXYZINE PAMOATE 25 MG/1
25 CAPSULE ORAL PRN
COMMUNITY
Start: 2022-08-06

## 2022-11-10 RX ORDER — PANTOPRAZOLE SODIUM 40 MG/1
40 TABLET, DELAYED RELEASE ORAL DAILY PRN
COMMUNITY
Start: 2021-09-15

## 2022-11-10 RX ORDER — MIRTAZAPINE 15 MG/1
7.5 TABLET, FILM COATED ORAL AT BEDTIME
COMMUNITY
Start: 2022-08-26

## 2022-11-10 RX ORDER — METOCLOPRAMIDE 10 MG/1
10 TABLET ORAL PRN
COMMUNITY
End: 2023-02-18

## 2022-11-10 RX ORDER — CYCLOBENZAPRINE HCL 10 MG
10 TABLET ORAL 3 TIMES DAILY PRN
COMMUNITY
Start: 2022-09-20

## 2022-11-10 RX ORDER — SPIRONOLACTONE 100 MG/1
100 TABLET, FILM COATED ORAL DAILY
COMMUNITY
Start: 2022-09-26

## 2022-11-10 RX ORDER — CETIRIZINE HYDROCHLORIDE 10 MG/1
10 TABLET ORAL PRN
COMMUNITY
End: 2022-11-14

## 2022-11-10 RX ORDER — HYDROCORTISONE 25 MG/G
OINTMENT TOPICAL PRN
COMMUNITY
Start: 2021-11-16 | End: 2022-11-14

## 2022-11-10 RX ORDER — MORPHINE SULFATE 15 MG/1
15 TABLET, FILM COATED, EXTENDED RELEASE ORAL EVERY 12 HOURS
COMMUNITY

## 2022-11-10 RX ORDER — THYROID 60 MG/1
60 TABLET ORAL DAILY
COMMUNITY
Start: 2022-04-26

## 2022-11-10 RX ORDER — PROGESTERONE 200 MG/1
200 CAPSULE ORAL AT BEDTIME
COMMUNITY
Start: 2022-11-07

## 2022-11-10 RX ORDER — DICYCLOMINE HYDROCHLORIDE 10 MG/1
10 CAPSULE ORAL 2 TIMES DAILY PRN
COMMUNITY
Start: 2021-08-11

## 2022-11-10 NOTE — PROGRESS NOTES
11/10/22 7330   Discharge Planning   Concerns to be Addressed no discharge needs identified;denies needs/concerns at this time   Living Arrangements   Is your private residence a single family home or apartment? Single family home   Number of Stairs, Within Home, Primary greater than 10 stairs   Stair Railings, Within Home, Primary railings safe and in good condition   Once home, are you able to live on one level? Yes   Which level? Upper Level   Bathroom Shower/Tub Walk-in shower   Equipment Currently Used at Home shower chair   Support System   Do you have someone available to stay with you one or two nights once you are home? Yes   Medical Clearance   Date of Physical   (unsure of date)   It is recommended that you call and check with any specialty providers before surgery to see if you need surgical clearance.  Do you see any specialty providers outside of your primary care provider? No   Blood   Known bleeding disorder or coagulopathy? Yes   Does the patient have any Synagogue/cultural preferences related to blood products? No   Education   Has the patient scheduled or completed pre-op total joint education, either in class or online, in the last 12 months? No  (read book)

## 2022-11-14 NOTE — PROVIDER NOTIFICATION
MD Notification    Notified Person: Mario Alberto (Dr. Beatty's )    Notification Date/Time: 11/14 @ 1100    Notification Interaction: Left voicemail    Purpose of Notification: Preop exam > 30 days. Will Dr. Beatty be able to update prior to surgery?      Update: 11/14 @ 9325 - wld answering service page Mario Alberto. Email sent by answering service with request for Mario Alberto to call writer.

## 2022-11-14 NOTE — PHARMACY-ADMISSION MEDICATION HISTORY
Admission medication history interview status for this patient is complete. See [facility] admission navigator for allergy information, prior to admission medications and immunization status.     Med rec completed by preadmitting RN  Reviewed by Leeanna Balderas, RN (Registered Nurse) on 11/10/22 at 1343    Called patient for clarifications - changes made -   DC'd Pravachol, Zyrtec, Famotidine, hydrocortisone  Changed dose/directions  Lidocain, trazodone, Seroquel 200 mg, protonix, strattera    Prior to Admission medications    Medication Sig Last Dose Taking? Auth Provider Long Term End Date   amitriptyline (ELAVIL) 25 MG tablet Take 25 mg by mouth At Bedtime  Yes Reported, Patient No    apixaban ANTICOAGULANT (ELIQUIS ANTICOAGULANT) 5 MG tablet Take 5 mg by mouth 2 times daily  Yes Reported, Patient     atomoxetine (STRATTERA) 80 MG capsule 80 mg daily  Yes Reported, Patient     busPIRone (BUSPAR) 15 MG tablet Take 15 mg by mouth 3 times daily   Yes Reported, Patient     cyclobenzaprine (FLEXERIL) 10 MG tablet Take 10 mg by mouth 3 times daily as needed  Yes Reported, Patient     dicyclomine (BENTYL) 10 MG capsule Take 10 mg by mouth 2 times daily as needed  Yes Reported, Patient     hydrOXYzine (VISTARIL) 25 MG capsule Take 25 mg by mouth as needed  Yes Reported, Patient     lidocaine (LIDODERM) 5 % Patch Apply 1 patch topically daily as needed  Yes Reported, Patient     linaclotide (LINZESS) 145 MCG capsule Take 145 mcg by mouth daily  Yes Reported, Patient     mirtazapine (REMERON) 15 MG tablet Take 7.5 mg by mouth At Bedtime  Yes Reported, Patient Yes    morphine (MS CONTIN) 15 MG CR tablet Take 15 mg by mouth every 12 hours  Yes Reported, Patient     morphine (MSIR) 15 MG IR tablet Take 1 tablet by mouth every 4 to 6 hours if needed for chronic Pain,Severe 7-10 On Pain Scale, max 4/day  Yes Reported, Patient     Multiple Vitamins-Minerals (MULTIVITAMIN ADULT PO)   Yes Reported, Patient     NONFORMULARY 2.5 mg 2 times  daily Medical Cannibus  Yes Reported, Patient     pantoprazole (PROTONIX) 40 MG EC tablet Take 40 mg by mouth daily as needed  Yes Reported, Patient     pravastatin (PRAVACHOL) 40 MG tablet Take 40 mg by mouth At Bedtime  Yes Reported, Patient Yes    progesterone (PROMETRIUM) 200 MG capsule Take 200 mg by mouth At Bedtime  Yes Reported, Patient     QUEtiapine (SEROQUEL) 200 MG tablet Take 200 mg by mouth nightly as needed  Yes Reported, Patient Yes    QUEtiapine (SEROQUEL) 25 MG tablet Take 25 mg by mouth as needed  Yes Reported, Patient Yes    spironolactone (ALDACTONE) 100 MG tablet Take 100 mg by mouth daily  Yes Reported, Patient Yes    thyroid (NP THYROID) 60 MG tablet Take 60 mg by mouth daily  Yes Reported, Patient Yes    topiramate (TOPAMAX) 100 MG tablet Take 1 tablet (100 mg) by mouth At Bedtime  Yes Kacy Calloway MD Yes    traZODone (DESYREL) 50 MG tablet Take 100 mg by mouth daily  Yes Reported, Patient Yes    triamcinolone (KENALOG) 0.1 % external cream   Yes Reported, Patient     metoclopramide (REGLAN) 10 MG tablet Take 10 mg by mouth as needed   Reported, Patient     metoprolol (LOPRESSOR) 25 MG tablet Take 25 mg by mouth 2 times daily   Reported, Patient Yes    omeprazole (PRILOSEC) 20 MG DR capsule Take 20 mg by mouth as needed   Reported, Patient

## 2022-11-15 ENCOUNTER — APPOINTMENT (OUTPATIENT)
Dept: GENERAL RADIOLOGY | Facility: CLINIC | Age: 47
End: 2022-11-15
Attending: ORTHOPAEDIC SURGERY
Payer: COMMERCIAL

## 2022-11-15 ENCOUNTER — APPOINTMENT (OUTPATIENT)
Dept: PHYSICAL THERAPY | Facility: CLINIC | Age: 47
End: 2022-11-15
Attending: ORTHOPAEDIC SURGERY
Payer: COMMERCIAL

## 2022-11-15 ENCOUNTER — ANESTHESIA (OUTPATIENT)
Dept: SURGERY | Facility: CLINIC | Age: 47
End: 2022-11-15
Payer: COMMERCIAL

## 2022-11-15 ENCOUNTER — ANESTHESIA EVENT (OUTPATIENT)
Dept: SURGERY | Facility: CLINIC | Age: 47
End: 2022-11-15
Payer: COMMERCIAL

## 2022-11-15 ENCOUNTER — HOSPITAL ENCOUNTER (OUTPATIENT)
Facility: CLINIC | Age: 47
Discharge: HOME | End: 2022-11-16
Attending: ORTHOPAEDIC SURGERY | Admitting: ORTHOPAEDIC SURGERY
Payer: COMMERCIAL

## 2022-11-15 DIAGNOSIS — Z86.718 PERSONAL HISTORY OF DVT (DEEP VEIN THROMBOSIS): ICD-10-CM

## 2022-11-15 DIAGNOSIS — M17.12 PRIMARY OSTEOARTHRITIS OF LEFT KNEE: ICD-10-CM

## 2022-11-15 DIAGNOSIS — Z96.652 TOTAL KNEE REPLACEMENT STATUS, LEFT: Primary | ICD-10-CM

## 2022-11-15 LAB
CREAT SERPL-MCNC: 0.76 MG/DL (ref 0.51–0.95)
CREAT SERPL-MCNC: 0.85 MG/DL (ref 0.51–0.95)
GFR SERPL CREATININE-BSD FRML MDRD: 85 ML/MIN/1.73M2
GFR SERPL CREATININE-BSD FRML MDRD: >90 ML/MIN/1.73M2
HGB BLD-MCNC: 11.6 G/DL (ref 11.7–15.7)
PLATELET # BLD AUTO: 244 10E3/UL (ref 150–450)
POTASSIUM SERPL-SCNC: 4.2 MMOL/L (ref 3.4–5.3)

## 2022-11-15 PROCEDURE — 999N000065 XR KNEE PORT LEFT 1/2 VIEWS: Mod: LT

## 2022-11-15 PROCEDURE — 250N000013 HC RX MED GY IP 250 OP 250 PS 637: Performed by: ORTHOPAEDIC SURGERY

## 2022-11-15 PROCEDURE — 85049 AUTOMATED PLATELET COUNT: CPT | Performed by: ORTHOPAEDIC SURGERY

## 2022-11-15 PROCEDURE — 250N000013 HC RX MED GY IP 250 OP 250 PS 637: Performed by: STUDENT IN AN ORGANIZED HEALTH CARE EDUCATION/TRAINING PROGRAM

## 2022-11-15 PROCEDURE — 250N000011 HC RX IP 250 OP 636: Performed by: PHYSICIAN ASSISTANT

## 2022-11-15 PROCEDURE — 272N000001 HC OR GENERAL SUPPLY STERILE: Performed by: ORTHOPAEDIC SURGERY

## 2022-11-15 PROCEDURE — 360N000077 HC SURGERY LEVEL 4, PER MIN: Performed by: ORTHOPAEDIC SURGERY

## 2022-11-15 PROCEDURE — 258N000003 HC RX IP 258 OP 636: Performed by: NURSE ANESTHETIST, CERTIFIED REGISTERED

## 2022-11-15 PROCEDURE — 250N000009 HC RX 250: Performed by: NURSE ANESTHETIST, CERTIFIED REGISTERED

## 2022-11-15 PROCEDURE — 250N000009 HC RX 250: Performed by: ORTHOPAEDIC SURGERY

## 2022-11-15 PROCEDURE — 258N000003 HC RX IP 258 OP 636: Performed by: PHYSICIAN ASSISTANT

## 2022-11-15 PROCEDURE — 370N000017 HC ANESTHESIA TECHNICAL FEE, PER MIN: Performed by: ORTHOPAEDIC SURGERY

## 2022-11-15 PROCEDURE — 710N000009 HC RECOVERY PHASE 1, LEVEL 1, PER MIN: Performed by: ORTHOPAEDIC SURGERY

## 2022-11-15 PROCEDURE — C1713 ANCHOR/SCREW BN/BN,TIS/BN: HCPCS | Performed by: ORTHOPAEDIC SURGERY

## 2022-11-15 PROCEDURE — 36415 COLL VENOUS BLD VENIPUNCTURE: CPT | Performed by: ORTHOPAEDIC SURGERY

## 2022-11-15 PROCEDURE — 36415 COLL VENOUS BLD VENIPUNCTURE: CPT | Performed by: ANESTHESIOLOGY

## 2022-11-15 PROCEDURE — 84132 ASSAY OF SERUM POTASSIUM: CPT | Performed by: ANESTHESIOLOGY

## 2022-11-15 PROCEDURE — 250N000011 HC RX IP 250 OP 636: Performed by: NURSE ANESTHETIST, CERTIFIED REGISTERED

## 2022-11-15 PROCEDURE — 82565 ASSAY OF CREATININE: CPT | Performed by: ANESTHESIOLOGY

## 2022-11-15 PROCEDURE — 97161 PT EVAL LOW COMPLEX 20 MIN: CPT | Mod: GP | Performed by: PHYSICAL THERAPIST

## 2022-11-15 PROCEDURE — 258N000003 HC RX IP 258 OP 636: Performed by: ORTHOPAEDIC SURGERY

## 2022-11-15 PROCEDURE — C1776 JOINT DEVICE (IMPLANTABLE): HCPCS | Performed by: ORTHOPAEDIC SURGERY

## 2022-11-15 PROCEDURE — 271N000001 HC OR GENERAL SUPPLY NON-STERILE: Performed by: ORTHOPAEDIC SURGERY

## 2022-11-15 PROCEDURE — 258N000001 HC RX 258: Performed by: ORTHOPAEDIC SURGERY

## 2022-11-15 PROCEDURE — 97530 THERAPEUTIC ACTIVITIES: CPT | Mod: GP | Performed by: PHYSICAL THERAPIST

## 2022-11-15 PROCEDURE — 82565 ASSAY OF CREATININE: CPT | Performed by: ORTHOPAEDIC SURGERY

## 2022-11-15 PROCEDURE — 250N000013 HC RX MED GY IP 250 OP 250 PS 637: Performed by: PHYSICIAN ASSISTANT

## 2022-11-15 PROCEDURE — 250N000011 HC RX IP 250 OP 636: Performed by: ORTHOPAEDIC SURGERY

## 2022-11-15 PROCEDURE — 85018 HEMOGLOBIN: CPT | Performed by: ANESTHESIOLOGY

## 2022-11-15 PROCEDURE — 97116 GAIT TRAINING THERAPY: CPT | Mod: GP | Performed by: PHYSICAL THERAPIST

## 2022-11-15 PROCEDURE — 250N000011 HC RX IP 250 OP 636: Performed by: ANESTHESIOLOGY

## 2022-11-15 PROCEDURE — 99203 OFFICE O/P NEW LOW 30 MIN: CPT | Performed by: STUDENT IN AN ORGANIZED HEALTH CARE EDUCATION/TRAINING PROGRAM

## 2022-11-15 PROCEDURE — 999N000141 HC STATISTIC PRE-PROCEDURE NURSING ASSESSMENT: Performed by: ORTHOPAEDIC SURGERY

## 2022-11-15 DEVICE — BONE CEMENT SIMPLEX FULL DOSE 6191-1-001: Type: IMPLANTABLE DEVICE | Site: KNEE | Status: FUNCTIONAL

## 2022-11-15 DEVICE — IMPLANTABLE DEVICE: Type: IMPLANTABLE DEVICE | Site: KNEE | Status: FUNCTIONAL

## 2022-11-15 DEVICE — IMP COMP FEM STRK TRIATHLN PS LT 3 5515-F-301: Type: IMPLANTABLE DEVICE | Site: KNEE | Status: FUNCTIONAL

## 2022-11-15 DEVICE — IMP INSERT BASEPLATE TIBIAL HOWM TRI 3 5521-B-300: Type: IMPLANTABLE DEVICE | Site: KNEE | Status: FUNCTIONAL

## 2022-11-15 DEVICE — IMP COMP PATELLA HOWM ASYM TRI 32X10MM 5551-L-320: Type: IMPLANTABLE DEVICE | Site: KNEE | Status: FUNCTIONAL

## 2022-11-15 RX ORDER — ACETAMINOPHEN 325 MG/1
975 TABLET ORAL EVERY 8 HOURS
Status: DISCONTINUED | OUTPATIENT
Start: 2022-11-15 | End: 2022-11-16 | Stop reason: HOSPADM

## 2022-11-15 RX ORDER — NALOXONE HYDROCHLORIDE 0.4 MG/ML
0.2 INJECTION, SOLUTION INTRAMUSCULAR; INTRAVENOUS; SUBCUTANEOUS
Status: DISCONTINUED | OUTPATIENT
Start: 2022-11-15 | End: 2022-11-16 | Stop reason: HOSPADM

## 2022-11-15 RX ORDER — SPIRONOLACTONE 100 MG/1
100 TABLET, FILM COATED ORAL DAILY
Status: DISCONTINUED | OUTPATIENT
Start: 2022-11-16 | End: 2022-11-16 | Stop reason: HOSPADM

## 2022-11-15 RX ORDER — TRAZODONE HYDROCHLORIDE 100 MG/1
100 TABLET ORAL DAILY
Status: DISCONTINUED | OUTPATIENT
Start: 2022-11-15 | End: 2022-11-16 | Stop reason: HOSPADM

## 2022-11-15 RX ORDER — HYDROXYZINE HYDROCHLORIDE 25 MG/1
25 TABLET, FILM COATED ORAL EVERY 6 HOURS PRN
Qty: 30 TABLET | Refills: 0 | Status: SHIPPED | OUTPATIENT
Start: 2022-11-15

## 2022-11-15 RX ORDER — MIRTAZAPINE 7.5 MG/1
7.5 TABLET, FILM COATED ORAL AT BEDTIME
Status: DISCONTINUED | OUTPATIENT
Start: 2022-11-15 | End: 2022-11-16 | Stop reason: HOSPADM

## 2022-11-15 RX ORDER — PROPOFOL 10 MG/ML
INJECTION, EMULSION INTRAVENOUS CONTINUOUS PRN
Status: DISCONTINUED | OUTPATIENT
Start: 2022-11-15 | End: 2022-11-15

## 2022-11-15 RX ORDER — LIDOCAINE HYDROCHLORIDE 10 MG/ML
INJECTION, SOLUTION INFILTRATION; PERINEURAL PRN
Status: DISCONTINUED | OUTPATIENT
Start: 2022-11-15 | End: 2022-11-15

## 2022-11-15 RX ORDER — HYDROMORPHONE HYDROCHLORIDE 2 MG/1
2-4 TABLET ORAL
Status: DISCONTINUED | OUTPATIENT
Start: 2022-11-15 | End: 2022-11-16 | Stop reason: HOSPADM

## 2022-11-15 RX ORDER — NALOXONE HYDROCHLORIDE 0.4 MG/ML
0.4 INJECTION, SOLUTION INTRAMUSCULAR; INTRAVENOUS; SUBCUTANEOUS
Status: DISCONTINUED | OUTPATIENT
Start: 2022-11-15 | End: 2022-11-16 | Stop reason: HOSPADM

## 2022-11-15 RX ORDER — AMOXICILLIN 250 MG
1 CAPSULE ORAL 2 TIMES DAILY
Status: DISCONTINUED | OUTPATIENT
Start: 2022-11-15 | End: 2022-11-16 | Stop reason: HOSPADM

## 2022-11-15 RX ORDER — PROPOFOL 10 MG/ML
INJECTION, EMULSION INTRAVENOUS PRN
Status: DISCONTINUED | OUTPATIENT
Start: 2022-11-15 | End: 2022-11-15

## 2022-11-15 RX ORDER — SODIUM CHLORIDE, SODIUM LACTATE, POTASSIUM CHLORIDE, CALCIUM CHLORIDE 600; 310; 30; 20 MG/100ML; MG/100ML; MG/100ML; MG/100ML
INJECTION, SOLUTION INTRAVENOUS CONTINUOUS PRN
Status: DISCONTINUED | OUTPATIENT
Start: 2022-11-15 | End: 2022-11-15

## 2022-11-15 RX ORDER — TOPIRAMATE 100 MG/1
100 TABLET, FILM COATED ORAL AT BEDTIME
Status: DISCONTINUED | OUTPATIENT
Start: 2022-11-15 | End: 2022-11-16 | Stop reason: HOSPADM

## 2022-11-15 RX ORDER — MEPERIDINE HYDROCHLORIDE 25 MG/ML
12.5 INJECTION INTRAMUSCULAR; INTRAVENOUS; SUBCUTANEOUS
Status: DISCONTINUED | OUTPATIENT
Start: 2022-11-15 | End: 2022-11-15 | Stop reason: HOSPADM

## 2022-11-15 RX ORDER — PROCHLORPERAZINE MALEATE 5 MG
10 TABLET ORAL EVERY 6 HOURS PRN
Status: DISCONTINUED | OUTPATIENT
Start: 2022-11-15 | End: 2022-11-16 | Stop reason: HOSPADM

## 2022-11-15 RX ORDER — ONDANSETRON 4 MG/1
4 TABLET, ORALLY DISINTEGRATING ORAL EVERY 30 MIN PRN
Status: DISCONTINUED | OUTPATIENT
Start: 2022-11-15 | End: 2022-11-15 | Stop reason: HOSPADM

## 2022-11-15 RX ORDER — BISACODYL 10 MG
10 SUPPOSITORY, RECTAL RECTAL DAILY PRN
Status: DISCONTINUED | OUTPATIENT
Start: 2022-11-15 | End: 2022-11-16 | Stop reason: HOSPADM

## 2022-11-15 RX ORDER — METOCLOPRAMIDE 5 MG/1
10 TABLET ORAL EVERY 6 HOURS PRN
Status: DISCONTINUED | OUTPATIENT
Start: 2022-11-15 | End: 2022-11-16 | Stop reason: HOSPADM

## 2022-11-15 RX ORDER — ATOMOXETINE 40 MG/1
80 CAPSULE ORAL DAILY
Status: DISCONTINUED | OUTPATIENT
Start: 2022-11-16 | End: 2022-11-16 | Stop reason: HOSPADM

## 2022-11-15 RX ORDER — SODIUM CHLORIDE, SODIUM LACTATE, POTASSIUM CHLORIDE, CALCIUM CHLORIDE 600; 310; 30; 20 MG/100ML; MG/100ML; MG/100ML; MG/100ML
INJECTION, SOLUTION INTRAVENOUS CONTINUOUS
Status: DISCONTINUED | OUTPATIENT
Start: 2022-11-15 | End: 2022-11-15 | Stop reason: HOSPADM

## 2022-11-15 RX ORDER — HYDRALAZINE HYDROCHLORIDE 20 MG/ML
2.5-5 INJECTION INTRAMUSCULAR; INTRAVENOUS EVERY 10 MIN PRN
Status: DISCONTINUED | OUTPATIENT
Start: 2022-11-15 | End: 2022-11-15 | Stop reason: HOSPADM

## 2022-11-15 RX ORDER — SODIUM CHLORIDE, SODIUM LACTATE, POTASSIUM CHLORIDE, CALCIUM CHLORIDE 600; 310; 30; 20 MG/100ML; MG/100ML; MG/100ML; MG/100ML
INJECTION, SOLUTION INTRAVENOUS CONTINUOUS
Status: DISCONTINUED | OUTPATIENT
Start: 2022-11-15 | End: 2022-11-16 | Stop reason: HOSPADM

## 2022-11-15 RX ORDER — ONDANSETRON 2 MG/ML
4 INJECTION INTRAMUSCULAR; INTRAVENOUS EVERY 6 HOURS PRN
Status: DISCONTINUED | OUTPATIENT
Start: 2022-11-15 | End: 2022-11-16 | Stop reason: HOSPADM

## 2022-11-15 RX ORDER — LIDOCAINE 40 MG/G
CREAM TOPICAL
Status: DISCONTINUED | OUTPATIENT
Start: 2022-11-15 | End: 2022-11-15 | Stop reason: HOSPADM

## 2022-11-15 RX ORDER — CYCLOBENZAPRINE HCL 10 MG
10 TABLET ORAL 3 TIMES DAILY PRN
Status: DISCONTINUED | OUTPATIENT
Start: 2022-11-15 | End: 2022-11-16 | Stop reason: HOSPADM

## 2022-11-15 RX ORDER — CEFAZOLIN SODIUM/WATER 2 G/20 ML
2 SYRINGE (ML) INTRAVENOUS
Status: COMPLETED | OUTPATIENT
Start: 2022-11-15 | End: 2022-11-15

## 2022-11-15 RX ORDER — POLYETHYLENE GLYCOL 3350 17 G/17G
17 POWDER, FOR SOLUTION ORAL DAILY
Status: DISCONTINUED | OUTPATIENT
Start: 2022-11-16 | End: 2022-11-16 | Stop reason: HOSPADM

## 2022-11-15 RX ORDER — ACETAMINOPHEN 325 MG/1
650 TABLET ORAL EVERY 4 HOURS PRN
Status: DISCONTINUED | OUTPATIENT
Start: 2022-11-18 | End: 2022-11-16 | Stop reason: HOSPADM

## 2022-11-15 RX ORDER — HYDROXYZINE HYDROCHLORIDE 25 MG/1
25 TABLET, FILM COATED ORAL EVERY 6 HOURS PRN
Status: DISCONTINUED | OUTPATIENT
Start: 2022-11-15 | End: 2022-11-16 | Stop reason: HOSPADM

## 2022-11-15 RX ORDER — LIDOCAINE 40 MG/G
CREAM TOPICAL
Status: DISCONTINUED | OUTPATIENT
Start: 2022-11-15 | End: 2022-11-16 | Stop reason: HOSPADM

## 2022-11-15 RX ORDER — METOPROLOL TARTRATE 1 MG/ML
1-2 INJECTION, SOLUTION INTRAVENOUS EVERY 5 MIN PRN
Status: DISCONTINUED | OUTPATIENT
Start: 2022-11-15 | End: 2022-11-15 | Stop reason: HOSPADM

## 2022-11-15 RX ORDER — MORPHINE SULFATE 2 MG/ML
4 INJECTION, SOLUTION INTRAMUSCULAR; INTRAVENOUS
Status: DISCONTINUED | OUTPATIENT
Start: 2022-11-15 | End: 2022-11-16 | Stop reason: HOSPADM

## 2022-11-15 RX ORDER — VANCOMYCIN HYDROCHLORIDE 1 G/20ML
INJECTION, POWDER, LYOPHILIZED, FOR SOLUTION INTRAVENOUS PRN
Status: DISCONTINUED | OUTPATIENT
Start: 2022-11-15 | End: 2022-11-15 | Stop reason: HOSPADM

## 2022-11-15 RX ORDER — ONDANSETRON 4 MG/1
4-8 TABLET, ORALLY DISINTEGRATING ORAL EVERY 8 HOURS PRN
Qty: 10 TABLET | Refills: 0 | Status: SHIPPED | OUTPATIENT
Start: 2022-11-15 | End: 2024-03-04

## 2022-11-15 RX ORDER — FENTANYL CITRATE 50 UG/ML
50 INJECTION, SOLUTION INTRAMUSCULAR; INTRAVENOUS EVERY 5 MIN PRN
Status: DISCONTINUED | OUTPATIENT
Start: 2022-11-15 | End: 2022-11-15 | Stop reason: HOSPADM

## 2022-11-15 RX ORDER — OXYCODONE HYDROCHLORIDE 5 MG/1
5-10 TABLET ORAL EVERY 4 HOURS PRN
Qty: 33 TABLET | Refills: 0 | Status: SHIPPED | OUTPATIENT
Start: 2022-11-15 | End: 2022-11-16

## 2022-11-15 RX ORDER — KETAMINE HYDROCHLORIDE 10 MG/ML
INJECTION INTRAMUSCULAR; INTRAVENOUS PRN
Status: DISCONTINUED | OUTPATIENT
Start: 2022-11-15 | End: 2022-11-15

## 2022-11-15 RX ORDER — PRAVASTATIN SODIUM 20 MG
40 TABLET ORAL AT BEDTIME
Status: DISCONTINUED | OUTPATIENT
Start: 2022-11-15 | End: 2022-11-16 | Stop reason: HOSPADM

## 2022-11-15 RX ORDER — DICYCLOMINE HYDROCHLORIDE 10 MG/1
10 CAPSULE ORAL 2 TIMES DAILY PRN
Status: DISCONTINUED | OUTPATIENT
Start: 2022-11-15 | End: 2022-11-16 | Stop reason: HOSPADM

## 2022-11-15 RX ORDER — PROGESTERONE 100 MG/1
200 CAPSULE ORAL AT BEDTIME
Status: DISCONTINUED | OUTPATIENT
Start: 2022-11-15 | End: 2022-11-16 | Stop reason: HOSPADM

## 2022-11-15 RX ORDER — ONDANSETRON 4 MG/1
4 TABLET, ORALLY DISINTEGRATING ORAL EVERY 6 HOURS PRN
Status: DISCONTINUED | OUTPATIENT
Start: 2022-11-15 | End: 2022-11-16 | Stop reason: HOSPADM

## 2022-11-15 RX ORDER — THYROID 30 MG/1
60 TABLET ORAL DAILY
Status: DISCONTINUED | OUTPATIENT
Start: 2022-11-15 | End: 2022-11-16 | Stop reason: HOSPADM

## 2022-11-15 RX ORDER — OXYCODONE HYDROCHLORIDE 10 MG/1
10 TABLET ORAL EVERY 4 HOURS PRN
Status: DISCONTINUED | OUTPATIENT
Start: 2022-11-15 | End: 2022-11-15

## 2022-11-15 RX ORDER — BUSPIRONE HYDROCHLORIDE 15 MG/1
15 TABLET ORAL 3 TIMES DAILY
Status: DISCONTINUED | OUTPATIENT
Start: 2022-11-15 | End: 2022-11-16 | Stop reason: HOSPADM

## 2022-11-15 RX ORDER — CEFAZOLIN SODIUM 1 G/3ML
1 INJECTION, POWDER, FOR SOLUTION INTRAMUSCULAR; INTRAVENOUS EVERY 8 HOURS
Status: COMPLETED | OUTPATIENT
Start: 2022-11-15 | End: 2022-11-16

## 2022-11-15 RX ORDER — ACETAMINOPHEN 325 MG/1
650 TABLET ORAL EVERY 4 HOURS PRN
Qty: 100 TABLET | Refills: 0 | Status: SHIPPED | OUTPATIENT
Start: 2022-11-15

## 2022-11-15 RX ORDER — FENTANYL CITRATE 50 UG/ML
INJECTION, SOLUTION INTRAMUSCULAR; INTRAVENOUS PRN
Status: DISCONTINUED | OUTPATIENT
Start: 2022-11-15 | End: 2022-11-15

## 2022-11-15 RX ORDER — AMOXICILLIN 250 MG
1-2 CAPSULE ORAL 2 TIMES DAILY
Qty: 30 TABLET | Refills: 0 | Status: SHIPPED | OUTPATIENT
Start: 2022-11-15

## 2022-11-15 RX ORDER — KETOROLAC TROMETHAMINE 15 MG/ML
15 INJECTION, SOLUTION INTRAMUSCULAR; INTRAVENOUS EVERY 6 HOURS PRN
Status: DISCONTINUED | OUTPATIENT
Start: 2022-11-15 | End: 2022-11-15 | Stop reason: HOSPADM

## 2022-11-15 RX ORDER — ONDANSETRON 2 MG/ML
INJECTION INTRAMUSCULAR; INTRAVENOUS PRN
Status: DISCONTINUED | OUTPATIENT
Start: 2022-11-15 | End: 2022-11-15

## 2022-11-15 RX ORDER — ENOXAPARIN SODIUM 100 MG/ML
40 INJECTION SUBCUTANEOUS EVERY 24 HOURS
Status: DISCONTINUED | OUTPATIENT
Start: 2022-11-16 | End: 2022-11-16 | Stop reason: HOSPADM

## 2022-11-15 RX ORDER — DEXAMETHASONE SODIUM PHOSPHATE 4 MG/ML
INJECTION, SOLUTION INTRA-ARTICULAR; INTRALESIONAL; INTRAMUSCULAR; INTRAVENOUS; SOFT TISSUE PRN
Status: DISCONTINUED | OUTPATIENT
Start: 2022-11-15 | End: 2022-11-15

## 2022-11-15 RX ORDER — FENTANYL CITRATE 50 UG/ML
50 INJECTION, SOLUTION INTRAMUSCULAR; INTRAVENOUS
Status: DISCONTINUED | OUTPATIENT
Start: 2022-11-15 | End: 2022-11-15 | Stop reason: HOSPADM

## 2022-11-15 RX ORDER — QUETIAPINE FUMARATE 200 MG/1
200 TABLET, FILM COATED ORAL
Status: DISCONTINUED | OUTPATIENT
Start: 2022-11-15 | End: 2022-11-16 | Stop reason: HOSPADM

## 2022-11-15 RX ORDER — CEFAZOLIN SODIUM/WATER 2 G/20 ML
2 SYRINGE (ML) INTRAVENOUS SEE ADMIN INSTRUCTIONS
Status: DISCONTINUED | OUTPATIENT
Start: 2022-11-15 | End: 2022-11-15 | Stop reason: HOSPADM

## 2022-11-15 RX ORDER — ENOXAPARIN SODIUM 100 MG/ML
40 INJECTION SUBCUTANEOUS EVERY 24 HOURS
Qty: 10 ML | Refills: 0 | Status: SHIPPED | OUTPATIENT
Start: 2022-11-15 | End: 2022-11-25

## 2022-11-15 RX ORDER — ONDANSETRON 2 MG/ML
4 INJECTION INTRAMUSCULAR; INTRAVENOUS EVERY 30 MIN PRN
Status: DISCONTINUED | OUTPATIENT
Start: 2022-11-15 | End: 2022-11-15 | Stop reason: HOSPADM

## 2022-11-15 RX ORDER — HYDROMORPHONE HYDROCHLORIDE 1 MG/ML
0.5 INJECTION, SOLUTION INTRAMUSCULAR; INTRAVENOUS; SUBCUTANEOUS EVERY 10 MIN PRN
Status: DISCONTINUED | OUTPATIENT
Start: 2022-11-15 | End: 2022-11-15

## 2022-11-15 RX ADMIN — PHENYLEPHRINE HYDROCHLORIDE 100 MCG: 10 INJECTION INTRAVENOUS at 07:58

## 2022-11-15 RX ADMIN — HYDROMORPHONE HYDROCHLORIDE 2 MG: 2 TABLET ORAL at 22:06

## 2022-11-15 RX ADMIN — FENTANYL CITRATE 100 MCG: 50 INJECTION, SOLUTION INTRAMUSCULAR; INTRAVENOUS at 07:45

## 2022-11-15 RX ADMIN — HYDROMORPHONE HYDROCHLORIDE 0.5 MG: 1 INJECTION, SOLUTION INTRAMUSCULAR; INTRAVENOUS; SUBCUTANEOUS at 08:20

## 2022-11-15 RX ADMIN — PRAVASTATIN SODIUM 40 MG: 20 TABLET ORAL at 22:06

## 2022-11-15 RX ADMIN — HYDROMORPHONE HYDROCHLORIDE 0.5 MG: 1 INJECTION, SOLUTION INTRAMUSCULAR; INTRAVENOUS; SUBCUTANEOUS at 11:36

## 2022-11-15 RX ADMIN — Medication 0.5 G: at 07:41

## 2022-11-15 RX ADMIN — SENNOSIDES AND DOCUSATE SODIUM 1 TABLET: 50; 8.6 TABLET ORAL at 20:46

## 2022-11-15 RX ADMIN — Medication 10 MG: at 08:43

## 2022-11-15 RX ADMIN — PHENYLEPHRINE HYDROCHLORIDE 100 MCG: 10 INJECTION INTRAVENOUS at 08:10

## 2022-11-15 RX ADMIN — PROPOFOL 200 MG: 10 INJECTION, EMULSION INTRAVENOUS at 07:45

## 2022-11-15 RX ADMIN — ACETAMINOPHEN 975 MG: 325 TABLET, FILM COATED ORAL at 22:06

## 2022-11-15 RX ADMIN — HYDROMORPHONE HYDROCHLORIDE 0.5 MG: 1 INJECTION, SOLUTION INTRAMUSCULAR; INTRAVENOUS; SUBCUTANEOUS at 08:02

## 2022-11-15 RX ADMIN — AMITRIPTYLINE HYDROCHLORIDE 25 MG: 25 TABLET, FILM COATED ORAL at 22:06

## 2022-11-15 RX ADMIN — BUSPIRONE HYDROCHLORIDE 15 MG: 15 TABLET ORAL at 20:46

## 2022-11-15 RX ADMIN — KETOROLAC TROMETHAMINE 15 MG: 15 INJECTION, SOLUTION INTRAMUSCULAR; INTRAVENOUS at 10:44

## 2022-11-15 RX ADMIN — HYDROXYZINE HYDROCHLORIDE 25 MG: 25 TABLET, FILM COATED ORAL at 20:47

## 2022-11-15 RX ADMIN — SODIUM CHLORIDE, POTASSIUM CHLORIDE, SODIUM LACTATE AND CALCIUM CHLORIDE: 600; 310; 30; 20 INJECTION, SOLUTION INTRAVENOUS at 13:56

## 2022-11-15 RX ADMIN — LINACLOTIDE 145 MCG: 145 CAPSULE, GELATIN COATED ORAL at 22:11

## 2022-11-15 RX ADMIN — BUSPIRONE HYDROCHLORIDE 15 MG: 15 TABLET ORAL at 16:00

## 2022-11-15 RX ADMIN — PROPOFOL 125 MCG/KG/MIN: 10 INJECTION, EMULSION INTRAVENOUS at 07:47

## 2022-11-15 RX ADMIN — Medication 0.5 G: at 07:38

## 2022-11-15 RX ADMIN — ONDANSETRON HYDROCHLORIDE 4 MG: 2 INJECTION, SOLUTION INTRAVENOUS at 09:04

## 2022-11-15 RX ADMIN — MIDAZOLAM 2 MG: 1 INJECTION INTRAMUSCULAR; INTRAVENOUS at 07:29

## 2022-11-15 RX ADMIN — DEXAMETHASONE SODIUM PHOSPHATE 4 MG: 4 INJECTION, SOLUTION INTRA-ARTICULAR; INTRALESIONAL; INTRAMUSCULAR; INTRAVENOUS; SOFT TISSUE at 07:45

## 2022-11-15 RX ADMIN — Medication 20 MG: at 08:29

## 2022-11-15 RX ADMIN — PROGESTERONE 200 MG: 100 CAPSULE ORAL at 22:06

## 2022-11-15 RX ADMIN — LEVOTHYROXINE, LIOTHYRONINE 60 MG: 19; 4.5 TABLET ORAL at 20:46

## 2022-11-15 RX ADMIN — FENTANYL CITRATE 50 MCG: 50 INJECTION, SOLUTION INTRAMUSCULAR; INTRAVENOUS at 09:13

## 2022-11-15 RX ADMIN — FENTANYL CITRATE 50 MCG: 50 INJECTION, SOLUTION INTRAMUSCULAR; INTRAVENOUS at 11:27

## 2022-11-15 RX ADMIN — FENTANYL CITRATE 50 MCG: 50 INJECTION, SOLUTION INTRAMUSCULAR; INTRAVENOUS at 11:00

## 2022-11-15 RX ADMIN — FENTANYL CITRATE 100 MCG: 50 INJECTION, SOLUTION INTRAMUSCULAR; INTRAVENOUS at 07:29

## 2022-11-15 RX ADMIN — Medication 1 G: at 07:48

## 2022-11-15 RX ADMIN — PHENYLEPHRINE HYDROCHLORIDE 100 MCG: 10 INJECTION INTRAVENOUS at 07:54

## 2022-11-15 RX ADMIN — ACETAMINOPHEN 975 MG: 325 TABLET, FILM COATED ORAL at 14:08

## 2022-11-15 RX ADMIN — CEFAZOLIN 1 G: 1 INJECTION, POWDER, FOR SOLUTION INTRAMUSCULAR; INTRAVENOUS at 15:54

## 2022-11-15 RX ADMIN — FENTANYL CITRATE 50 MCG: 50 INJECTION, SOLUTION INTRAMUSCULAR; INTRAVENOUS at 11:16

## 2022-11-15 RX ADMIN — SODIUM CHLORIDE, POTASSIUM CHLORIDE, SODIUM LACTATE AND CALCIUM CHLORIDE: 600; 310; 30; 20 INJECTION, SOLUTION INTRAVENOUS at 07:05

## 2022-11-15 RX ADMIN — TRAZODONE HYDROCHLORIDE 100 MG: 100 TABLET ORAL at 20:47

## 2022-11-15 RX ADMIN — SODIUM CHLORIDE, POTASSIUM CHLORIDE, SODIUM LACTATE AND CALCIUM CHLORIDE: 600; 310; 30; 20 INJECTION, SOLUTION INTRAVENOUS at 08:40

## 2022-11-15 RX ADMIN — HYDROMORPHONE HYDROCHLORIDE 2 MG: 2 TABLET ORAL at 18:51

## 2022-11-15 RX ADMIN — FENTANYL CITRATE 50 MCG: 50 INJECTION, SOLUTION INTRAMUSCULAR; INTRAVENOUS at 11:05

## 2022-11-15 RX ADMIN — HYDROMORPHONE HYDROCHLORIDE 2 MG: 2 TABLET ORAL at 15:52

## 2022-11-15 RX ADMIN — QUETIAPINE FUMARATE 200 MG: 200 TABLET ORAL at 20:46

## 2022-11-15 RX ADMIN — LIDOCAINE HYDROCHLORIDE 50 MG: 10 INJECTION, SOLUTION INFILTRATION; PERINEURAL at 07:45

## 2022-11-15 RX ADMIN — MIRTAZAPINE 7.5 MG: 7.5 TABLET, FILM COATED ORAL at 22:05

## 2022-11-15 RX ADMIN — HYDROMORPHONE HYDROCHLORIDE 1 MG: 1 INJECTION, SOLUTION INTRAMUSCULAR; INTRAVENOUS; SUBCUTANEOUS at 08:24

## 2022-11-15 RX ADMIN — FENTANYL CITRATE 50 MCG: 50 INJECTION, SOLUTION INTRAMUSCULAR; INTRAVENOUS at 09:30

## 2022-11-15 ASSESSMENT — ACTIVITIES OF DAILY LIVING (ADL)
ADLS_ACUITY_SCORE: 20

## 2022-11-15 NOTE — ANESTHESIA POSTPROCEDURE EVALUATION
Patient: Leola Uriostegui    Procedure: Procedure(s):  Left total knee arthroplasty       Anesthesia Type:  General    Note:  Disposition: Admission   Postop Pain Control: Uneventful            Sign Out: Well controlled pain   PONV: No   Neuro/Psych: Uneventful            Sign Out: Acceptable/Baseline neuro status   Airway/Respiratory: Uneventful            Sign Out: Acceptable/Baseline resp. status   CV/Hemodynamics: Uneventful            Sign Out: Acceptable CV status; No obvious hypovolemia; No obvious fluid overload   Other NRE: NONE   DID A NON-ROUTINE EVENT OCCUR? No           Last vitals:  Vitals Value Taken Time   /71 11/15/22 1235   Temp 98.8  F (37.1  C) 11/15/22 1154   Pulse 89 11/15/22 1241   Resp 24 11/15/22 1241   SpO2 100 % 11/15/22 1242   Vitals shown include unvalidated device data.    Electronically Signed By: Ray Cooley MD  November 15, 2022  3:44 PM   [Menstruating] : The patient is menstruating [Menarche Age ____] : age at menarche was [unfilled] [Definite ___ (Date)] : the last menstrual period was [unfilled] [Regular Cycle Intervals] : have been regular [Total Preg ___] : G[unfilled] [History of Hormone Replacement Treatment] : has no history of hormone replacement treatment [FreeTextEntry6] : none [FreeTextEntry7] : none

## 2022-11-15 NOTE — OP NOTE
Procedure Date: 11/15/2022    PREOPERATIVE DIAGNOSES:  Left knee primary osteoarthritis.    POSTOPERATIVE DIAGNOSIS:  Left knee primary osteoarthritis.    PROCEDURE:  Left total knee arthroplasty.    SURGEON:  Valentín Beatty MD    ASSISTANT:  Javier Kearns PA-C    ANESTHESIA:  General, regional and local.    ESTIMATED BLOOD LOSS:  Less than 20 mL    COMPLICATIONS:  None apparent.    INDICATIONS FOR PROCEDURE:  Leola is a well-known patient of mine whom I have treated for knee arthritis for many years.  She attempted conservative management including injections, medications and therapy, which failed to relieve her symptoms adequately.  Based on poor quality of life, she elected for total knee arthroplasty.  Risks, benefits and alternatives were reviewed with her in the clinic prior to the procedure and the consent was signed today.    DESCRIPTION OF PROCEDURE:  Leola was brought to the operating room and placed supine on the operating table.  General endotracheal anesthesia was administered after a block was established in the PACU.  Her left lower extremity was then prepped and draped in the usual sterile fashion for left total knee arthroplasty.  After a timeout confirmed the appropriate limb, a midline incision was undertaken with sharp dissection down to the patella.  Medial parapatellar arthrotomy was performed and the patella was easily everted.  She had grade 4 changes in the patellofemoral joint as well as primarily in the medial compartment with minimal changes in the lateral compartment.  We then removed some of the fat pad, did a small medial release as she had varus-type arthritis and removed some of the bursal tissue from the proximal femur.  With that completed, the knee was then flexed up.  We removed a large osteophyte off the proximal tibia and used our opening reamer to open up the canal.  We used our flex tereso system.  She did have full extension, so we took off 8 distally, which was consistent with our  templating.  We used our posterior referencing and sized her to a 3.  We then used our 4-in-1 cutting jig to make our anterior, posterior and chamfer cuts.  Once that was completed, we then turned attention to the tibia, used our extramedullary tibia guide.  We took roughly 2 off medially, which took roughly 7 off laterally consistent with our templating and made a nice flat cut.  We then used our soft tissue tensioning guide and noted we were around a 9 mm poly; however, she was still slightly tight medially in both flexion and extension.  We then turned our attention back to the femur.  We cut our box for posterior stabilized component, released the rest of the PCL.  We then released posterior osteophytes and the medial and lateral meniscus tissues.  Once that was completed, we then did a trial.  We did perform a slightly larger medial release and we felt the tissues balanced nicely in both flexion and extension.  We were pleased with our trial at this point. We then cut the patella to accept a 32 mm patellar button, drilling our 3 holes.  This was trialed and she had excellent tracking and no lateral release was needed.  We then removed all components, irrigated copiously.  We injected the posterior capsule with our joint cocktail.  We then did a final irrigation and cemented our components without difficulty, waiting roughly 20 minutes for the cement to harden, and in the last 3 minutes, we did a Betadine soak.  This was then irrigated out completely.  The final poly had been placed during the cement hardening process.  That completed, knee was brought through range of motion.  Once the cement was hard, excess cement was removed from the components.  She had excellent range of motion from 0 to 145 degrees and was stable to varus and valgus stress at 0 and 30 degrees.      We then did a final irrigation.  One gram powdered vancomycin was placed in the knee.  Standard layer closure.  Sterile dressings were  applied.  She was brought to PACU in stable condition.  Needle and sponge counts correct.    PLAN:  The patient will be weightbearing as tolerates.  She will be admitted outpatient in a bed.  She was given Ancef 2 grams within an hour of the procedure and this will be discontinued in 24 hours.  She will be placed on Lovenox and pneumoboots for DVT prophylaxis, transitioning to Eliquis in roughly 10 days as she has had a history of pulmonary embolism. Pneumoboots were used during the procedure as well.      Valentín Beatty MD        D: 11/15/2022   T: 11/15/2022   MT: ROMEO    Name:     SUSANA COLUNGA  MRN:      [id number]        Account:        [id number]   :      1975           Procedure Date: 11/15/2022     Document: [id number]    cc:  Vandana Kearns NP   [facility]

## 2022-11-15 NOTE — ANESTHESIA PROCEDURE NOTES
Saphenous Procedure Note    Pre-Procedure   Staff -        Anesthesiologist:  Ray Cooley MD       Performed By: Anesthesiologist       Location: pre-op       Pre-Anesthestic Checklist: patient identified, IV checked, site marked, risks and benefits discussed, informed consent, monitors and equipment checked, pre-op evaluation, at physician/surgeon's request and post-op pain management  Timeout:       Correct Patient: Yes        Correct Procedure: Yes        Correct Site: Yes        Correct Position: Yes        Correct Laterality: Yes        Site Marked: Yes  Procedure Documentation  Procedure: Saphenous       Diagnosis: KNEE DJD       Laterality: left       Patient Position: supine       Patient Prep/Sterile Barriers: sterile gloves, mask       Skin prep: Betadine       Needle Type: short bevel and insulated       Needle Gauge: 21.        Needle Length (Inches): 4        Ultrasound guided       1. Ultrasound was used to identify targeted nerve, plexus, vascular marker, or fascial plane and place a needle adjacent to it in real-time.       2. Ultrasound was used to visualize the spread of anesthetic in close proximity to the above referenced structure.    Assessment/Narrative         The placement was negative for: blood aspirated, painful injection and site bleeding       Paresthesias: No.       Bolus given via needle..        Secured via.        Insertion/Infusion Method: Single Shot       Complications: none       Injection made incrementally with aspirations every 5 mL.     Comments:  The surgeon has given a verbal order transferring care of this patient to me for the performance of a regional analgesia block for post-op pain control. It is requested of me because I am uniquely trained and qualified to perform this block and the surgeon is neither trained nor qualified to perform this procedure.    20 ml 0.5% bupivacaine        FOR [facility] ([facility]) ONLY:   Pain Team Contact information: please page the  "Pain Team [facility]. Search \"Pain\". During daytime hours, please page the attending first. At night please page the resident first.    "

## 2022-11-15 NOTE — PROGRESS NOTES
Arrived to room (622 ) from PACU at ( 1255) via cart, transferred to bed via hover mat without difficulty, alert and oriented x 4, oriented to room and call system, dressing CDI, CMS intact, IV patent and infusing, dur to void, rates pain ( 6-8/10, SCD's on BLE. Bryant ice chips well. Frequent VS started.

## 2022-11-15 NOTE — PLAN OF CARE

## 2022-11-15 NOTE — ANESTHESIA PROCEDURE NOTES
Airway       Patient location during procedure: OR  Staff -        Anesthesiologist:  Ray Cooley MD       Performed By: anesthesiologist  Consent for Airway        Urgency: elective  Indications and Patient Condition       Indications for airway management: syl-procedural       Induction type:intravenous       Mask difficulty assessment: 1 - vent by mask    Final Airway Details       Final airway type: supraglottic airway    Supraglottic Airway Details        Type: LMA       Brand: I-Gel       LMA size: 4    Post intubation assessment        Placement verified by: capnometry, equal breath sounds and chest rise        Number of attempts at approach: 1       Number of other approaches attempted: 0       Secured with: commercial tube chua       Ease of procedure: easy       Dentition: Intact and Unchanged

## 2022-11-15 NOTE — ANESTHESIA PREPROCEDURE EVALUATION
Anesthesia Pre-Procedure Evaluation    Patient: Leola Uriostegui   MRN: [id number] : 1975        Procedure : Procedure(s):  Left unicompartmental total knee arthroplasty          Past Medical History:   Diagnosis Date     Anemia      Bipolar I disorder (H)      Cervical stenosis of spinal canal      Continuous opioid dependence (H)      DDD (degenerative disc disease), lumbar      Deep vein thrombosis (H) 2021    bilateral     Gastroesophageal reflux disease      Hypertension      Irritable bowel syndrome      Itching      Migraine      Mixed hyperlipidemia      PONV (postoperative nausea and vomiting)      Pulmonary embolism (H) 2021    2 times, 2nd time 2021     Tachycardia      Venous (peripheral) insufficiency       Past Surgical History:   Procedure Laterality Date     ABDOMEN SURGERY      bowel resection x3 for meckels diverticulum     APPENDECTOMY       BACK SURGERY  2009    cervical fusion     BUNIONECTOMY Left 2010     CHOLECYSTECTOMY       COLONOSCOPY       DISTAL BICEPS TENDON REPAIR Left      ENT SURGERY  1988    T&A     EYE SURGERY Bilateral 2008    strabismus     GENITOURINARY SURGERY      bladder repair     GI SURGERY      EGD X2     HERNIA REPAIR  2019    ventral in , incisional in 2019     HYSTERECTOMY  2003     LAPAROSCOPY      abdominal laparoscopy X15     ORTHOPEDIC SURGERY Left 2010    repair of meniscus     DE ANESTH,DX ARTHROSCOPIC PROC KNEE JOINT Right       Allergies   Allergen Reactions     Cephalexin      PN: LW Reaction: Rash     Cephalosporins Diarrhea     Clindamycin Swelling     Covid-19 (Mrna) Vaccine Hives     Doxycycline      PN: LW Reaction: nausea     Hydromorphone      PN: LW Reaction: Rash     Lorazepam Other (See Comments)     Nitrofurantoin Diarrhea     Oxycodone Itching     Oxycodone-Acetaminophen      PN: LW Reaction: Vision Changes     Sulfamethoxazole W/Trimethoprim Rash     Tape [Adhesive Tape] Rash      Social History     Tobacco Use     Smoking  status: Former     Types: Cigarettes     Quit date: 1998     Years since quittin.0     Smokeless tobacco: Never   Substance Use Topics     Alcohol use: Not Currently      Wt Readings from Last 1 Encounters:   11/15/22 68 kg (150 lb)        Anesthesia Evaluation   Pt has had prior anesthetic.     History of anesthetic complications  - PONV.      ROS/MED HX  ENT/Pulmonary:     (+) asthma  (-) sleep apnea   Neurologic:       Cardiovascular:     (+) Dyslipidemia hypertension-----    METS/Exercise Tolerance:     Hematologic:     (+) History of blood clots,     Musculoskeletal:       GI/Hepatic:     (+) GERD,     Renal/Genitourinary:       Endo:       Psychiatric/Substance Use:     (+) psychiatric history bipolar H/O chronic opiod use .     Infectious Disease:       Malignancy:       Other:            Physical Exam    Airway        Mallampati: II   TM distance: > 3 FB   Neck ROM: full     Respiratory Devices and Support         Dental           Cardiovascular          Rhythm and rate: regular and normal     Pulmonary           breath sounds clear to auscultation           OUTSIDE LABS:  CBC:   Lab Results   Component Value Date    WBC 4.0 2021    HGB 13.0 2021    HCT 38.8 2021     2021     BMP:   Lab Results   Component Value Date     2021    POTASSIUM 3.5 2021    CHLORIDE 111 (H) 2021    CO2 24 2021    BUN 15 2021    CR 0.88 2021    GLC 89 2021     COAGS: No results found for: PTT, INR, FIBR  POC: No results found for: BGM, HCG, HCGS  HEPATIC:   Lab Results   Component Value Date    ALBUMIN 3.9 2021    PROTTOTAL 7.6 2021    ALT 97 (H) 2021    AST 51 (H) 2021    ALKPHOS 79 2021    BILITOTAL 0.5 2021     OTHER:   Lab Results   Component Value Date    AMAN 9.2 2021       Anesthesia Plan    ASA Status:  3   NPO Status:  NPO Appropriate    Anesthesia Type: General.     - Airway: LMA   Induction:  Intravenous.   Maintenance: Balanced.        Consents    Anesthesia Plan(s) and associated risks, benefits, and realistic alternatives discussed. Questions answered and patient/representative(s) expressed understanding.    - Discussed:     - Discussed with:  Patient         Postoperative Care    Pain management: IV analgesics, Oral pain medications, Multi-modal analgesia.   PONV prophylaxis: Ondansetron (or other 5HT-3), Dexamethasone or Solumedrol     Comments:                Ray Cooley MD

## 2022-11-15 NOTE — PROGRESS NOTES
11/15/22 1550   Appointment Info   Signing Clinician's Name / Credentials (PT) Desire Jordan, NEENA   Living Environment   People in Home child(surendra), adult;spouse   Current Living Arrangements house  (split level)   Home Accessibility stairs to enter home;stairs within home   Number of Stairs, Main Entrance 2   Stair Railings, Main Entrance none   Number of Stairs, Within Home, Primary seven   Stair Railings, Within Home, Primary railings safe and in good condition   Transportation Anticipated family or friend will provide   Living Environment Comments lives in a split level home with adult son and spouse   Self-Care   Usual Activity Tolerance moderate   Current Activity Tolerance fair   Equipment Currently Used at Home shower chair  (has a raised toilet seat; 2WW, 4WW, and crutches)   Fall history within last six months no   Activity/Exercise/Self-Care Comment reports being independent with mobility and cares prior to admit; limited by pain   General Information   Onset of Illness/Injury or Date of Surgery 11/15/22   Referring Physician Valentín Beatty MD   Patient/Family Therapy Goals Statement (PT) return home with assist of family and OP PT   Pertinent History of Current Problem (include personal factors and/or comorbidities that impact the POC) Patient seen POD #0 s/p L TKA; see medical record for further information   Existing Precautions/Restrictions fall   Weight-Bearing Status - LLE weight-bearing as tolerated   Cognition   Cognitive Status Comments appears intact during session   Pain Assessment   Patient Currently in Pain Yes, see Vital Sign flowsheet  (5/10)   Integumentary/Edema   Integumentary/Edema Comments L knee incision; covered   Range of Motion (ROM)   ROM Comment L LE limited by recent surgery and pain; others appear WFL   Strength (Manual Muscle Testing)   Strength Comments L LE limited by recent surgery and pain; unable to SLR L LE   Bed Mobility   Comment, (Bed Mobility) mod A for L LE to EOB;  HOB elevated; use of rail   Transfers   Comment, (Transfers) min A for sit>stand; use of walker and KI   Gait/Stairs (Locomotion)   Distance in Feet (Required for LE Total Joints) 25 feet   Comment, (Gait/Stairs) use of walker and min A   Balance   Balance Comments current need for walker and assist; no gross LOB   Sensory Examination   Sensory Perception Comments intact per patient report   Clinical Impression   Criteria for Skilled Therapeutic Intervention Yes, treatment indicated   PT Diagnosis (PT) impaired functional mobility   Influenced by the following impairments decreased L knee ROM/strength; impaired balance   Functional limitations due to impairments impaired independence with mobility and cares secondary to above deficits   Clinical Presentation (PT Evaluation Complexity) Stable/Uncomplicated   Clinical Presentation Rationale clinical judgement; level of assist   Clinical Decision Making (Complexity) low complexity   Planned Therapy Interventions (PT) bed mobility training;gait training;ROM (range of motion);strengthening;stretching;transfer training;progressive activity/exercise   Anticipated Equipment Needs at Discharge (PT) walker, rolling;crutches, axillary   Risk & Benefits of therapy have been explained evaluation/treatment results reviewed;care plan/treatment goals reviewed;risks/benefits reviewed;current/potential barriers reviewed;participants included;participants voiced agreement with care plan;patient   PT Total Evaluation Time   PT Eval, Low Complexity Minutes (59930) 10   Plan of Care Review   Plan of Care Reviewed With patient   Physical Therapy Goals   PT Frequency Daily   PT Predicted Duration/Target Date for Goal Attainment 11/16/22   PT Goals Bed Mobility;Transfers;Gait;Stairs   PT: Bed Mobility Independent;Supine to/from sit   PT: Transfers Supervision/stand-by assist;Sit to/from stand;Bed to/from chair;Assistive device   PT: Gait Supervision/stand-by assist;Rolling walker;100 feet    PT: Stairs Minimal assist;7 stairs;Assistive device  (with use of rail (CGA); min A for stairs with no rail)   PT Discharge Planning   PT Discharge Recommendation (DC Rec)   (defer to Ortho)   PT Rationale for DC Rec Anticipate patient will be safe to discharge to home with assist of family and use of walker on level surfaces; crutch/rail on stairs; has OP PT scheduled and has all needed equipment for PT mobility   PT Brief overview of current status A x 1 with walker

## 2022-11-15 NOTE — ANESTHESIA CARE TRANSFER NOTE
Patient: Leola Uriostegui    Procedure: Procedure(s):  Left total knee arthroplasty       Diagnosis: Primary osteoarthritis of left knee [M17.12]  Diagnosis Additional Information: No value filed.    Anesthesia Type:   General     Note:    Oropharynx: oral airway in place  Level of Consciousness: drowsy  Oxygen Supplementation: face mask  Level of Supplemental Oxygen (L/min / FiO2): 6  Independent Airway: airway patency satisfactory and stable  Dentition: dentition unchanged  Vital Signs Stable: post-procedure vital signs reviewed and stable  Report to RN Given: handoff report given  Patient transferred to: PACU    Handoff Report: Identifed the Patient, Identified the Reponsible Provider, Reviewed the pertinent medical history, Discussed the surgical course, Reviewed Intra-OP anesthesia mangement and issues during anesthesia, Set expectations for post-procedure period and Allowed opportunity for questions and acknowledgement of understanding      Vitals:  Vitals Value Taken Time   BP     Temp     Pulse     Resp     SpO2         Electronically Signed By: BA Hazel CRNA  November 15, 2022  9:53 AM

## 2022-11-15 NOTE — BRIEF OP NOTE
[facility]    Brief Operative Note    Pre-operative diagnosis: Primary osteoarthritis of left knee [M17.12]  Post-operative diagnosis Same as pre-operative diagnosis    Procedure: Procedure(s):  Left total knee arthroplasty  Surgeon: Surgeon(s) and Role:     * Valentín Beatty MD - Primary     * Tiana Kearns PA - Assisting  Anesthesia: General with Block   Estimated Blood Loss: Less than 50 ml    Drains: None  Specimens: * No specimens in log *  Findings:   None.  Complications: None.  Implants:   Implant Name Type Inv. Item Serial No.  Lot No. LRB No. Used Action   BONE CEMENT SIMPLEX FULL DOSE 6191-1-001 - [id number] Cement, Bone BONE CEMENT SIMPLEX FULL DOSE 6191-1-001  [employer] [id number] Left 2 Implanted   IMP INSERT TIBIAL STRK TRI SIZE 3  09MM 9224-T-[id number] - [id number] Total Joint Component/Insert IMP INSERT TIBIAL STRK TRI SIZE 3  09MM [id number]  [id number] [id number] Left 1 Implanted   IMP COMP PATELLA HOWM ASYM TRI [id number] 5551-L-320 - [id number] Total Joint Component/Insert IMP COMP PATELLA HOWM ASYM TRI [id number] 5551-L-320  [employer] [id number] Left 1 Implanted   IMP INSERT BASEPLATE TIBIAL HOWM TRI 3 5521-B-300 - [id number] Total Joint Component/Insert IMP INSERT BASEPLATE TIBIAL HOWM TRI 3 5521-B-300  [employer] [id number] Left 1 Implanted   IMP COMP FEM STRK TRIATHLN PS LT 3 5515-F-301 - [id number] Total Joint Component/Insert IMP COMP FEM STRK TRIATHLN PS LT 3 5515-F-301  [employer] [id number] Left 1 Implanted

## 2022-11-15 NOTE — CONSULTS
"[facility]  Consult Note - Hospitalist Service  Date of Admission:  11/15/2022  Consult Requested by: Valentín Beatty  Reason for Consult: Medical management    Assessment & Plan   Leola Uriostegui is a 47 year old female admitted on 11/15/2022.     She had left total knee arthroplasty and hospital medicine was consulted for medical management.  She has history of bipolar disorder, essential hypertension, DVT/PE and hyperlipidemia.      1. Right knee arthroplasty    Postoperative management as per orthopedics    2. History of DVT/PE    Had 2 episodes, first one was 5 days after the first dose of COVID-19 vaccine and the second dose 8 days after the second dose of COVID-19 vaccine.  She had extensive testing by hematology and did not have any hypercoagulability disorder.      Prior to admission on apixaban 5 mg twice daily, last used 2 years ago.    Currently started on prophylactic Lovenox, transition to therapeutic dose of Eliquis as soon as cleared by orthopedics.    3. Bipolar disorder    Prior to admission on amitriptyline, Seroquel, topiramate, trazodone, Remeron and BuSpar, resume .    Symptoms controlled.    4. Essential hypertension.    Prior to admission on metoprolol 25 mg twice daily and spironolactone 100 mg daily, continue starting tomorrow.    5. Hypothyroidism.    Continued thyroid replacement.    6. Dyslipidemia.    Continue Pravachol.         The patient's care was discussed with the Patient.    Margarito Min MD  [facility]  Securely message with the Vocera Web Console (learn more here)  Text page via [facility] Paging/Directory       Hospitalist Service    Clinically Significant Risk Factors Present on Admission               # Drug Induced Coagulation Defect: home medication list includes an anticoagulant medication        # Overweight: Estimated body mass index is 27.44 kg/m  as calculated from the following:    Height as of this encounter: 1.575 m (5' 2\").    " Weight as of this encounter: 68 kg (150 lb).           ______________________________________________________________________    Chief Complaint   Pain controlled    History is obtained from the patient    History of Present Illness   Leola Uriostegui is a 47 year old female who underwent elective left knee arthroplasty.    Review of Systems     10 point review of system reviewed and negative as above.    Past Medical History    I have reviewed this patient's medical history and updated it with pertinent information if needed.   Past Medical History:   Diagnosis Date     Anemia      Bipolar I disorder (H)      Cervical stenosis of spinal canal      Continuous opioid dependence (H)      DDD (degenerative disc disease), lumbar      Deep vein thrombosis (H) 01/2021    bilateral     Gastroesophageal reflux disease      Hypertension      Irritable bowel syndrome      Itching      Migraine      Mixed hyperlipidemia      PONV (postoperative nausea and vomiting)      Pulmonary embolism (H) 01/2021    2 times, 2nd time 8/2021     Tachycardia      Venous (peripheral) insufficiency        Past Surgical History   I have reviewed this patient's surgical history and updated it with pertinent information if needed.  Past Surgical History:   Procedure Laterality Date     ABDOMEN SURGERY      bowel resection x3 for meckels diverticulum     APPENDECTOMY       BACK SURGERY  2009    cervical fusion     BUNIONECTOMY Left 2010     CHOLECYSTECTOMY       COLONOSCOPY       DISTAL BICEPS TENDON REPAIR Left      ENT SURGERY  1988    T&A     EYE SURGERY Bilateral 2008    strabismus     GENITOURINARY SURGERY      bladder repair     GI SURGERY      EGD X2     HERNIA REPAIR  2019    ventral in 2008, incisional in 2019     HYSTERECTOMY  2003     LAPAROSCOPY      abdominal laparoscopy X15     ORTHOPEDIC SURGERY Left 2010    repair of meniscus     VA ANESTH,DX ARTHROSCOPIC PROC KNEE JOINT Right 2020       Social History   I have reviewed this  patient's social history and updated it with pertinent information if needed.  Social History     Tobacco Use     Smoking status: Former     Types: Cigarettes     Quit date: 1998     Years since quittin.0     Smokeless tobacco: Never   Substance Use Topics     Alcohol use: Not Currently     Drug use: Yes     Comment: medical cannibus       Family History         Medications   I have reviewed this patient's current medications  Medications Prior to Admission   Medication Sig Dispense Refill Last Dose     amitriptyline (ELAVIL) 25 MG tablet Take 25 mg by mouth At Bedtime   2022     apixaban ANTICOAGULANT (ELIQUIS) 5 MG tablet Take 5 mg by mouth 2 times daily   2022     atomoxetine (STRATTERA) 80 MG capsule 80 mg daily   2022     busPIRone (BUSPAR) 15 MG tablet Take 15 mg by mouth 3 times daily    2022     cyclobenzaprine (FLEXERIL) 10 MG tablet Take 10 mg by mouth 3 times daily as needed   2022     dicyclomine (BENTYL) 10 MG capsule Take 10 mg by mouth 2 times daily as needed   2022     hydrOXYzine (VISTARIL) 25 MG capsule Take 25 mg by mouth as needed   2022     lidocaine (LIDODERM) 5 % Patch Apply 1 patch topically daily as needed   2022     linaclotide (LINZESS) 145 MCG capsule Take 145 mcg by mouth daily   2022     metoclopramide (REGLAN) 10 MG tablet Take 10 mg by mouth as needed   Past Week     metoprolol (LOPRESSOR) 25 MG tablet Take 25 mg by mouth 2 times daily   11/15/2022     mirtazapine (REMERON) 15 MG tablet Take 7.5 mg by mouth At Bedtime   2022     morphine (MS CONTIN) 15 MG CR tablet Take 15 mg by mouth every 12 hours   2022     morphine (MSIR) 15 MG IR tablet Take 1 tablet by mouth every 4 to 6 hours if needed for chronic Pain,Severe 7-10 On Pain Scale, max 4/day   2022     Multiple Vitamins-Minerals (MULTIVITAMIN ADULT PO)    2022     NONFORMULARY 2.5 mg 2 times daily Medical Cannibus   2022     omeprazole  (PRILOSEC) 20 MG DR capsule Take 20 mg by mouth as needed   11/14/2022     pantoprazole (PROTONIX) 40 MG EC tablet Take 40 mg by mouth daily as needed   11/14/2022     pravastatin (PRAVACHOL) 40 MG tablet Take 40 mg by mouth At Bedtime   11/14/2022     progesterone (PROMETRIUM) 200 MG capsule Take 200 mg by mouth At Bedtime   11/14/2022     QUEtiapine (SEROQUEL) 200 MG tablet Take 200 mg by mouth nightly as needed   11/14/2022     QUEtiapine (SEROQUEL) 25 MG tablet Take 25 mg by mouth as needed   11/14/2022     spironolactone (ALDACTONE) 100 MG tablet Take 100 mg by mouth daily   11/14/2022     thyroid (ARMOUR) 60 MG tablet Take 60 mg by mouth daily   11/14/2022     topiramate (TOPAMAX) 100 MG tablet Take 1 tablet (100 mg) by mouth At Bedtime 30 tablet 11 11/14/2022     traZODone (DESYREL) 50 MG tablet Take 100 mg by mouth daily   11/14/2022     triamcinolone (KENALOG) 0.1 % external cream    More than a month       Allergies   Allergies   Allergen Reactions     Cephalexin      PN: LW Reaction: Rash     Cephalosporins Diarrhea     Clindamycin Swelling     Covid-19 (Mrna) Vaccine Hives     Doxycycline      PN: LW Reaction: nausea     Hydromorphone      PN: LW Reaction: Rash     Lorazepam Other (See Comments)     Nitrofurantoin Diarrhea     Oxycodone Hives and Itching     Oxycodone-Acetaminophen      PN: LW Reaction: Vision Changes     Sulfamethoxazole W/Trimethoprim Rash     Tape [Adhesive Tape] Rash       Physical Exam   Vital Signs: Temp: 97  F (36.1  C) Temp src: Temporal BP: 105/66 Pulse: 89   Resp: 14 SpO2: 99 % O2 Device: Nasal cannula Oxygen Delivery: 2 LPM  Weight: 150 lbs 0 oz    General Appearance: Alert awake and oriented x3..  Eyes: No icterus  HEENT: Moist mucosa  Respiratory: Clear to auscultation  Cardiovascular: S1 is normal  GI: Soft and nontender  Lymph/Hematologic: Not examined  Genitourinary: Not examined  Skin: No rash  Musculoskeletal: Left knee is dressed.  Neurologic: Nonfocal  Psychiatric:  Normal mood    Data     Results for orders placed or performed during the hospital encounter of 11/15/22 (from the past 24 hour(s))   Hemoglobin   Result Value Ref Range    Hemoglobin 11.6 (L) 11.7 - 15.7 g/dL   Creatinine   Result Value Ref Range    Creatinine 0.85 0.51 - 0.95 mg/dL    GFR Estimate 85 >60 mL/min/1.73m2   Potassium   Result Value Ref Range    Potassium 4.2 3.4 - 5.3 mmol/L   XR Knee Port Left 1/2 Views    Narrative    KNEE ONE-TWO VIEWS LEFT  11/15/2022 11:35 AM     HISTORY: Post-Op Total Knee  COMPARISON: None.      Impression    IMPRESSION:  Status post recent left total knee arthroplasty. No  immediate hardware complication. Expected postsurgical soft tissue  edema, subcutaneous emphysema, gas-containing joint effusion, and skin  staples. No acute fracture or malalignment.    PATTI HANSEN MD         SYSTEM ID:  [id number]   Creatinine   Result Value Ref Range    Creatinine 0.76 0.51 - 0.95 mg/dL    GFR Estimate >90 >60 mL/min/1.73m2   Platelet count   Result Value Ref Range    Platelet Count 244 150 - 450 10e3/uL     Most Recent 3 CBC's:Recent Labs   Lab Test 11/15/22  1417 11/15/22  0713 11/24/21  1110   WBC  --   --  4.0   HGB  --  11.6* 13.0   MCV  --   --  90     --  277     Most Recent 3 BMP's:Recent Labs   Lab Test 11/15/22  1417 11/15/22  0713 11/24/21  1110   NA  --   --  141   POTASSIUM  --  4.2 3.5   CHLORIDE  --   --  111*   CO2  --   --  24   BUN  --   --  15   CR 0.76 0.85 0.88   ANIONGAP  --   --  6   AMAN  --   --  9.2   GLC  --   --  89     Most Recent 2 LFT's:Recent Labs   Lab Test 11/24/21  1110   AST 51*   ALT 97*   ALKPHOS 79   BILITOTAL 0.5

## 2022-11-16 ENCOUNTER — APPOINTMENT (OUTPATIENT)
Dept: OCCUPATIONAL THERAPY | Facility: CLINIC | Age: 47
End: 2022-11-16
Attending: ORTHOPAEDIC SURGERY
Payer: COMMERCIAL

## 2022-11-16 ENCOUNTER — APPOINTMENT (OUTPATIENT)
Dept: PHYSICAL THERAPY | Facility: CLINIC | Age: 47
End: 2022-11-16
Attending: ORTHOPAEDIC SURGERY
Payer: COMMERCIAL

## 2022-11-16 VITALS
RESPIRATION RATE: 10 BRPM | OXYGEN SATURATION: 100 % | HEIGHT: 62 IN | BODY MASS INDEX: 27.6 KG/M2 | HEART RATE: 103 BPM | TEMPERATURE: 97.7 F | WEIGHT: 150 LBS | DIASTOLIC BLOOD PRESSURE: 91 MMHG | SYSTOLIC BLOOD PRESSURE: 126 MMHG

## 2022-11-16 LAB
ANION GAP SERPL CALCULATED.3IONS-SCNC: 10 MMOL/L (ref 7–15)
BASOPHILS # BLD AUTO: 0 10E3/UL (ref 0–0.2)
BASOPHILS NFR BLD AUTO: 0 %
BUN SERPL-MCNC: 13.2 MG/DL (ref 6–20)
CALCIUM SERPL-MCNC: 8.8 MG/DL (ref 8.6–10)
CHLORIDE SERPL-SCNC: 106 MMOL/L (ref 98–107)
CREAT SERPL-MCNC: 0.71 MG/DL (ref 0.51–0.95)
DEPRECATED HCO3 PLAS-SCNC: 21 MMOL/L (ref 22–29)
EOSINOPHIL # BLD AUTO: 0 10E3/UL (ref 0–0.7)
EOSINOPHIL NFR BLD AUTO: 0 %
ERYTHROCYTE [DISTWIDTH] IN BLOOD BY AUTOMATED COUNT: 13.9 % (ref 10–15)
GFR SERPL CREATININE-BSD FRML MDRD: >90 ML/MIN/1.73M2
GLUCOSE SERPL-MCNC: 103 MG/DL (ref 70–99)
HCT VFR BLD AUTO: 32.2 % (ref 35–47)
HGB BLD-MCNC: 10.1 G/DL (ref 11.7–15.7)
HGB BLD-MCNC: 10.1 G/DL (ref 11.7–15.7)
IMM GRANULOCYTES # BLD: 0 10E3/UL
IMM GRANULOCYTES NFR BLD: 0 %
LYMPHOCYTES # BLD AUTO: 2 10E3/UL (ref 0.8–5.3)
LYMPHOCYTES NFR BLD AUTO: 27 %
MCH RBC QN AUTO: 30.1 PG (ref 26.5–33)
MCHC RBC AUTO-ENTMCNC: 31.4 G/DL (ref 31.5–36.5)
MCV RBC AUTO: 96 FL (ref 78–100)
MONOCYTES # BLD AUTO: 0.7 10E3/UL (ref 0–1.3)
MONOCYTES NFR BLD AUTO: 9 %
NEUTROPHILS # BLD AUTO: 4.9 10E3/UL (ref 1.6–8.3)
NEUTROPHILS NFR BLD AUTO: 64 %
NRBC # BLD AUTO: 0 10E3/UL
NRBC BLD AUTO-RTO: 0 /100
PLATELET # BLD AUTO: 246 10E3/UL (ref 150–450)
POTASSIUM SERPL-SCNC: 3.9 MMOL/L (ref 3.4–5.3)
RBC # BLD AUTO: 3.36 10E6/UL (ref 3.8–5.2)
SODIUM SERPL-SCNC: 137 MMOL/L (ref 136–145)
WBC # BLD AUTO: 7.6 10E3/UL (ref 4–11)

## 2022-11-16 PROCEDURE — 250N000013 HC RX MED GY IP 250 OP 250 PS 637: Performed by: INTERNAL MEDICINE

## 2022-11-16 PROCEDURE — 97530 THERAPEUTIC ACTIVITIES: CPT | Mod: GP | Performed by: PHYSICAL THERAPIST

## 2022-11-16 PROCEDURE — 36415 COLL VENOUS BLD VENIPUNCTURE: CPT | Performed by: STUDENT IN AN ORGANIZED HEALTH CARE EDUCATION/TRAINING PROGRAM

## 2022-11-16 PROCEDURE — 80048 BASIC METABOLIC PNL TOTAL CA: CPT | Performed by: STUDENT IN AN ORGANIZED HEALTH CARE EDUCATION/TRAINING PROGRAM

## 2022-11-16 PROCEDURE — 250N000013 HC RX MED GY IP 250 OP 250 PS 637: Performed by: PHYSICIAN ASSISTANT

## 2022-11-16 PROCEDURE — 250N000011 HC RX IP 250 OP 636: Performed by: ORTHOPAEDIC SURGERY

## 2022-11-16 PROCEDURE — 99213 OFFICE O/P EST LOW 20 MIN: CPT | Performed by: INTERNAL MEDICINE

## 2022-11-16 PROCEDURE — 85018 HEMOGLOBIN: CPT | Performed by: ORTHOPAEDIC SURGERY

## 2022-11-16 PROCEDURE — 96372 THER/PROPH/DIAG INJ SC/IM: CPT | Performed by: ORTHOPAEDIC SURGERY

## 2022-11-16 PROCEDURE — 97535 SELF CARE MNGMENT TRAINING: CPT | Mod: GO | Performed by: REHABILITATION PRACTITIONER

## 2022-11-16 PROCEDURE — 97110 THERAPEUTIC EXERCISES: CPT | Mod: GP | Performed by: PHYSICAL THERAPIST

## 2022-11-16 PROCEDURE — 250N000013 HC RX MED GY IP 250 OP 250 PS 637: Performed by: ORTHOPAEDIC SURGERY

## 2022-11-16 PROCEDURE — 97165 OT EVAL LOW COMPLEX 30 MIN: CPT | Mod: GO | Performed by: REHABILITATION PRACTITIONER

## 2022-11-16 PROCEDURE — 85025 COMPLETE CBC W/AUTO DIFF WBC: CPT | Performed by: STUDENT IN AN ORGANIZED HEALTH CARE EDUCATION/TRAINING PROGRAM

## 2022-11-16 PROCEDURE — 97116 GAIT TRAINING THERAPY: CPT | Mod: GP | Performed by: PHYSICAL THERAPIST

## 2022-11-16 PROCEDURE — 250N000013 HC RX MED GY IP 250 OP 250 PS 637: Performed by: STUDENT IN AN ORGANIZED HEALTH CARE EDUCATION/TRAINING PROGRAM

## 2022-11-16 RX ORDER — PANTOPRAZOLE SODIUM 40 MG/1
40 TABLET, DELAYED RELEASE ORAL
Status: DISCONTINUED | OUTPATIENT
Start: 2022-11-16 | End: 2022-11-16 | Stop reason: HOSPADM

## 2022-11-16 RX ORDER — CALCIUM CARBONATE 500 MG/1
1000 TABLET, CHEWABLE ORAL 3 TIMES DAILY PRN
Status: DISCONTINUED | OUTPATIENT
Start: 2022-11-16 | End: 2022-11-16 | Stop reason: HOSPADM

## 2022-11-16 RX ORDER — HYDROMORPHONE HYDROCHLORIDE 2 MG/1
2-4 TABLET ORAL
Qty: 20 TABLET | Refills: 0 | Status: SHIPPED | OUTPATIENT
Start: 2022-11-16 | End: 2024-07-31

## 2022-11-16 RX ADMIN — TOPIRAMATE 100 MG: 100 TABLET, FILM COATED ORAL at 00:01

## 2022-11-16 RX ADMIN — HYDROMORPHONE HYDROCHLORIDE 4 MG: 2 TABLET ORAL at 11:27

## 2022-11-16 RX ADMIN — CEFAZOLIN 1 G: 1 INJECTION, POWDER, FOR SOLUTION INTRAMUSCULAR; INTRAVENOUS at 00:12

## 2022-11-16 RX ADMIN — SPIRONOLACTONE 100 MG: 100 TABLET, FILM COATED ORAL at 08:25

## 2022-11-16 RX ADMIN — MORPHINE SULFATE 4 MG: 2 INJECTION, SOLUTION INTRAMUSCULAR; INTRAVENOUS at 00:02

## 2022-11-16 RX ADMIN — HYDROMORPHONE HYDROCHLORIDE 4 MG: 2 TABLET ORAL at 05:09

## 2022-11-16 RX ADMIN — ACETAMINOPHEN 975 MG: 325 TABLET, FILM COATED ORAL at 06:43

## 2022-11-16 RX ADMIN — HYDROMORPHONE HYDROCHLORIDE 4 MG: 2 TABLET ORAL at 08:26

## 2022-11-16 RX ADMIN — PANTOPRAZOLE SODIUM 40 MG: 40 TABLET, DELAYED RELEASE ORAL at 06:44

## 2022-11-16 RX ADMIN — ENOXAPARIN SODIUM 40 MG: 40 INJECTION SUBCUTANEOUS at 06:49

## 2022-11-16 RX ADMIN — HYDROMORPHONE HYDROCHLORIDE 2 MG: 2 TABLET ORAL at 02:14

## 2022-11-16 RX ADMIN — BUSPIRONE HYDROCHLORIDE 15 MG: 15 TABLET ORAL at 08:25

## 2022-11-16 RX ADMIN — PANTOPRAZOLE SODIUM 40 MG: 40 TABLET, DELAYED RELEASE ORAL at 02:11

## 2022-11-16 RX ADMIN — SENNOSIDES AND DOCUSATE SODIUM 1 TABLET: 50; 8.6 TABLET ORAL at 08:25

## 2022-11-16 RX ADMIN — ATOMOXETINE 80 MG: 40 CAPSULE ORAL at 08:24

## 2022-11-16 ASSESSMENT — ACTIVITIES OF DAILY LIVING (ADL)
ADLS_ACUITY_SCORE: 20
IADL_COMMENTS: FAMILY TO COMPLETE AS NEEDED
ADLS_ACUITY_SCORE: 20

## 2022-11-16 NOTE — PROGRESS NOTES
Occupational Therapy Discharge Summary    Reason for therapy discharge:    All goals and outcomes met, no further needs identified.    Progress towards therapy goal(s). See goals on Care Plan in [facility] electronic health record for goal details.  Goals met    Therapy recommendation(s):    No further therapy is recommended.

## 2022-11-16 NOTE — CONSULTS
Care Management Discharge Note    Discharge Date: 11/16/2022       Discharge Disposition:  Home with family    Discharge Services: OPPT     Discharge DME:  He has needed equipment    Discharge Transportation: family or friend will provide      Additional Information:  Care Transitions Team: Following for CC, discharge planning, and disposition post TKA.      Per chart review MD has consulted CM/SW for potential for disposition concerns.     Per PT assessment, anticipate patient will be safe to discharge to home with assist of family and use of walker on level surfaces; crutch/rail on stairs; has OP PT scheduled and has all needed equipment for PT mobility. Anticipate patient will discharge home with family today. No CM needs identified.      Please re- consult CM if there are changes/concern with above plan.                Annika Shukla RN BSN CM  Inpatient Care Coordination  [facility]  [phone]

## 2022-11-16 NOTE — PROGRESS NOTES
[facility]  Hospitalist Progress Note  mUer Dolan MD 11/16/22    Reason for Stay (Diagnosis): Total knee arthroplasty         Assessment and Plan:      Summary of Stay: Leola Uriostegui is a 47 year old female admitted on 11/15/2022 for left total knee arthroplasty and hospital medicine was consulted for medical management.  She has history of bipolar disorder, essential hypertension, DVT/PE and hyperlipidemia.    She overall has done well and is planning to discharge home today.  Denies cardiopulmonary complaints.  She is on chronic narcotics in the form of MS Contin and short acting morphine.  For now she will continue her long-acting morphine and her short acting morphine has been replaced with oral Dilaudid in the acute postoperative pain period.        1. Right knee arthroplasty    Postoperative management as per orthopedics     2. History of DVT/PE    Had 2 episodes, first one was 5 days after the first dose of COVID-19 vaccine and the second dose 8 days after the second dose of COVID-19 vaccine.  She had extensive testing by hematology and did not have any hypercoagulability disorder.      Prior to admission on apixaban 5 mg twice daily, last used 2 years ago.    Currently started on prophylactic Lovenox, transition to therapeutic dose of Eliquis as soon as cleared by orthopedics.  It appears they plan to have her on lovenox for 10 days then transition back to eliquis.     3. Bipolar disorder    Prior to admission on amitriptyline, Seroquel, topiramate, trazodone, Remeron and BuSpar, resume .    Symptoms controlled.     4. Essential hypertension.    Prior to admission on metoprolol 25 mg twice daily and spironolactone 100 mg daily, continue.     5. Hypothyroidism.    Continued thyroid replacement.     6. Dyslipidemia.    Continue Pravachol.             Interval History (Subjective):      I assumed care today, Leola is feeling well and plans to discharge  She is well aware and  "cannot describe the plan for her anticoagulation as well as pain management  No cardiopulmonary complaints                  Physical Exam:      Last Vital Signs:  BP (!) 126/91 (BP Location: Left arm)   Pulse 103   Temp 97.7  F (36.5  C) (Temporal)   Resp 10   Ht 1.575 m (5' 2\")   Wt 68 kg (150 lb)   SpO2 100%   BMI 27.44 kg/m      I/O last 3 completed shifts:  In: 1600 [I.V.:1600]  Out: 2950 [Urine:2900; Blood:50]    General: Alert, awake, no acute distress.  HEENT: NC/AT, eyes anicteric, external occular movements intact, face symmetric.  Dentition WNL, MM moist.  Cardiac: RRR, S1, S2.  No murmurs appreciated.  Pulmonary: Normal chest rise, normal work of breathing.  Lungs CTA BL  Abdomen: soft, non-tender, non-distended.  Bowel Sounds Present.  No guarding.  Extremities: no deformities.  Warm, well perfused.  Skin: no rashes or lesions noted.  Warm and Dry.  Neuro: No focal deficits noted.  Speech clear.  Coordination and strength grossly normal.  Psych: Appropriate affect.         Medications:      All current medications were reviewed with changes reflected in problem list.         Data:      All new lab and imaging data was reviewed.   Labs:  Recent Labs   Lab 11/16/22  0626      POTASSIUM 3.9   CHLORIDE 106   CO2 21*   ANIONGAP 10   *   BUN 13.2   CR 0.71   GFRESTIMATED >90   AMAN 8.8     Recent Labs   Lab 11/16/22  0654   WBC 7.6   HGB 10.1*  10.1*   HCT 32.2*   MCV 96         Imaging:   Recent Results (from the past 48 hour(s))   XR Knee Port Left 1/2 Views    Narrative    KNEE ONE-TWO VIEWS LEFT  11/15/2022 11:35 AM     HISTORY: Post-Op Total Knee  COMPARISON: None.      Impression    IMPRESSION:  Status post recent left total knee arthroplasty. No  immediate hardware complication. Expected postsurgical soft tissue  edema, subcutaneous emphysema, gas-containing joint effusion, and skin  staples. No acute fracture or malalignment.    PATTI HANSEN MD         SYSTEM ID:  " [id number]         Umer Dolan MD

## 2022-11-16 NOTE — PLAN OF CARE
Physical Therapy Discharge Summary    Reason for therapy discharge:    All goals and outcomes met, no further needs identified.    Progress towards therapy goal(s). See goals on Care Plan in [facility] electronic health record for goal details.  Goals met    Therapy recommendation(s):    Defer to ortho

## 2022-11-16 NOTE — PROGRESS NOTES
11/16/22 0931   Appointment Info   Signing Clinician's Name / Credentials (OT) Annika Foreman OTR/L   Living Environment   People in Home child(surendra), adult;spouse   Current Living Arrangements house   Home Accessibility stairs to enter home;stairs within home   Number of Stairs, Main Entrance 2   Number of Stairs, Within Home, Primary seven   Transportation Anticipated family or friend will provide   Living Environment Comments lives in a split level home with adult son and spouse   Self-Care   Usual Activity Tolerance moderate   Current Activity Tolerance moderate   Equipment Currently Used at Home shower chair;grab bar, tub/shower;walker, rolling;raised toilet seat   Fall history within last six months no   Activity/Exercise/Self-Care Comment reports being independent with mobility and cares prior to admit; limited by pain   Instrumental Activities of Daily Living (IADL)   IADL Comments family to complete as needed   General Information   Onset of Illness/Injury or Date of Surgery 11/15/22   Referring Physician RITCHIE BUITRAGO MD   Patient/Family Therapy Goal Statement (OT) to return home   Additional Occupational Profile Info/Pertinent History of Current Problem Patient is POD #1 for L TKA   Existing Precautions/Restrictions fall   Left Lower Extremity (Weight-bearing Status) weight-bearing as tolerated (WBAT)   General Observations and Info patient was sitting EOB, agreeable to OT session   Cognitive Status Examination   Orientation Status orientation to person, place and time   Visual Perception   Visual Impairment/Limitations corrective lenses full-time   Pain Assessment   Patient Currently in Pain Yes, see Vital Sign flowsheet  (rating not provided, patient indicates varied pain throughout session)   Posture   Posture not impaired   Range of Motion Comprehensive   General Range of Motion bilateral upper extremity ROM WFL   Strength Comprehensive (MMT)   Comment, General Manual Muscle Testing (MMT) Assessment  decreased strength in L LE to be expected   Muscle Tone Assessment   Muscle Tone Quick Adds No deficits were identified   Coordination   Upper Extremity Coordination No deficits were identified   Bed Mobility   Comment (Bed Mobility) min A to lift L LE in to bed   Transfers   Transfer Comments CGA, fww sit to stand   Balance   Balance Comments LOB was noted, general unsteadiness to be expected   Activities of Daily Living   BADL Assessment/Intervention bathing;lower body dressing;toileting   Bathing Assessment/Intervention   [place] Level (Bathing) minimum assist (75% patient effort)   Lower Body Dressing Assessment/Training   [place] Level (Lower Body Dressing) minimum assist (75% patient effort)   Toileting   Comment, (Toileting) patient declined to practice, reports she can manage with RN staff, has RTS at home to use as needed.   [place] Level (Toileting) contact guard assist   Clinical Impression   Criteria for Skilled Therapeutic Interventions Met (OT) Yes, treatment indicated   OT Diagnosis decreased ADLs/IADLs   OT Problem List-Impairments impacting ADL activity tolerance impaired;balance;pain;strength;range of motion (ROM)   Assessment of Occupational Performance 5 or more Performance Deficits   Identified Performance Deficits dsg, bathing, functional/community mobility, household chores, driving, errands   Planned Therapy Interventions (OT) ADL retraining;progressive activity/exercise;transfer training   Clinical Decision Making Complexity (OT) low complexity   Anticipated Equipment Needs Upon Discharge (OT)   (LHS)   Risk & Benefits of therapy have been explained evaluation/treatment results reviewed;care plan/treatment goals reviewed;risks/benefits reviewed;current/potential barriers reviewed;patient;participants included   OT Total Evaluation Time   OT Eval, Low Complexity Minutes (40240) 8   OT Goals   Therapy Frequency (OT) One time eval and treatment   OT Predicted Duration/Target Date  for Goal Attainment 11/16/22   OT Goals Lower Body Dressing;Transfers;OT Goal 1;OT Goal 2   OT: Lower Body Dressing Minimal assist;Goal Met;Completed   OT: Transfer Supervision/stand-by assist;with assistive device;Goal Met;Completed  (walk in shower)   OT: Goal 1 Patient will verbalize at least 2-3 adaptations to ADLs routines at home to accommodate energy level and safety needs.- goal met   OT: Goal 2 Patient will demonstrate safe use of walker during ADLs.- goal met   OT Discharge Planning   OT Plan dc   OT Rationale for DC Rec defer to ortho team for dc plan- patient has met needed goals for safe discharge home with family to A as needed with IADL's; household chores, driving, errands, cooking and bathing. Recommended AE; LHS, patient has all other needed AE. Will discharge from OT services.   OT Brief overview of current status SBA, fww functional mobility, min A for dressing, SBA bathing   Total Session Time   Total Session Time (sum of timed and untimed services) 8

## 2022-11-16 NOTE — PROGRESS NOTES
[facility]    Orthopedics Progress Note     Assessment & Plan   Leola Uriostegui is a 47 year old female who was admitted on 11/15/2022 for left TKA. Plan is to continue therapy this morning and then discharge home. Follow-up in two weeks.       Disposition Plan   Expected discharge: Today, recommended to prior living arrangement once therapy is complete.     Entered: JESUS Lacey 11/16/2022, 8:39 AM   Information in the above section will display in the discharge planner report.    JESUS Lacey    Interval History   No major events overnight. Patient tolerating oral hydromorphone well.     Physical Exam   Temp: 98.2  F (36.8  C) Temp src: Temporal BP: 122/74 Pulse: 87   Resp: 10 SpO2: 97 % O2 Device: None (Room air) Oxygen Delivery: 2 LPM  Vitals:    11/10/22 1400 11/15/22 0614   Weight: 62.1 kg (137 lb) 68 kg (150 lb)     Vital Signs with Ranges  Temp:  [97  F (36.1  C)-98.8  F (37.1  C)] 98.2  F (36.8  C)  Pulse:  [83-98] 87  Resp:  [9-20] 10  BP: (101-138)/(55-89) 122/74  SpO2:  [97 %-100 %] 97 %  I/O last 3 completed shifts:  In: 1600 [I.V.:1600]  Out: 2950 [Urine:2900; Blood:50]    Dressing c/d/i. Calves soft. NV intact in foot and ankle.     Medications     lactated ringers Stopped (11/15/22 2337)       acetaminophen  975 mg Oral Q8H     amitriptyline  25 mg Oral At Bedtime     atomoxetine  80 mg Oral Daily     busPIRone  15 mg Oral TID     enoxaparin ANTICOAGULANT  40 mg Subcutaneous Q24H     linaclotide  145 mcg Oral Daily     mirtazapine  7.5 mg Oral At Bedtime     pantoprazole  40 mg Oral QAM AC     polyethylene glycol  17 g Oral Daily     pravastatin  40 mg Oral At Bedtime     progesterone  200 mg Oral At Bedtime     senna-docusate  1 tablet Oral BID     sodium chloride (PF)  3 mL Intracatheter Q8H     spironolactone  100 mg Oral Daily     thyroid  60 mg Oral Daily     topiramate  100 mg Oral At Bedtime     traZODone  100 mg Oral Daily       Data    Recent Labs   Lab 11/16/22  0654 11/16/22  0626 11/15/22  1417 11/15/22  0713   WBC 7.6  --   --   --    HGB 10.1*  10.1*  --   --  11.6*   MCV 96  --   --   --      --  244  --    NA  --  137  --   --    POTASSIUM  --  3.9  --  4.2   CHLORIDE  --  106  --   --    CO2  --  21*  --   --    BUN  --  13.2  --   --    CR  --  0.71 0.76 0.85   ANIONGAP  --  10  --   --    AMAN  --  8.8  --   --    GLC  --  103*  --   --

## 2022-11-16 NOTE — PLAN OF CARE
Goal Outcome Evaluation:  Patient vital signs are at baseline: Yes  Patient able to ambulate as they were prior to admission or with assist devices provided by therapies during their stay:  Yes  Patient MUST void prior to discharge:  Yes  Patient able to tolerate oral intake:  Yes  Pain has adequate pain control using Oral analgesics:  Yes  Does patient have an identified :  Yes  Has goal D/C date and time been discussed with patient:  Yes    Reviewed discharge instructions and medications with patient and son. Questions answered. Patient discharged to home with son, discharge instructions, medications (Dilaudid, Tylenol, Lovenox, zofran, Atarax), and belongings.

## 2022-11-16 NOTE — PLAN OF CARE
Goal Outcome Evaluation:             Patient vital signs are at baseline: Yes  Patient able to ambulate as they were prior to admission or with assist devices provided by therapies during their stay:  Yes  Patient MUST void prior to discharge:  Yes  Patient able to tolerate oral intake:  Yes  Pain has adequate pain control using Oral analgesics:  Yes  Does patient have an identified :  Yes  Has goal D/C date and time been discussed with patient:  Yes       Pt is A&O. Plan to discharge home today.

## 2022-11-17 NOTE — DISCHARGE SUMMARY
Admit Date: 11/15/2022  Discharge Date: 11/16/2022    ADMISSION DIAGNOSIS:  Left knee degenerative joint disease.    DISCHARGE DIAGNOSIS:  Status post left total knee arthroplasty.    PROCEDURE:  Left total knee arthroplasty performed by Dr. Valentín Beatty at [facility] on 11/15/2022.    INDICATIONS FOR ADMISSION:  The patient was admitted for postoperative observation cares and pain control following her total knee arthroplasty.    HOSPITAL COURSE:  On 11/15/2022, the patient underwent a planned left total knee arthroplasty at [facility].  She tolerated the procedure well and was transferred up to medical surgical floor in stable condition.  During her stay, her vital signs remained stable.  Her hemoglobin dropped to a low of 10.1 postoperatively, and she required no blood transfusions.  Physical and Occupational Therapy were consulted, and at her time of discharge, she was ambulating well with a front-wheeled walker.  DVT and PE prophylaxis included early ambulation, lower extremity compression devices, lower extremity pneumatic devices and Lovenox subcutaneous injections.  Incentive spirometry was utilized for pneumonia prophylaxis.  On 11/16/2022, the patient was in stable condition and she was discharged home.    DISCHARGE INSTRUCTIONS:  On 11/16/2022, the patient was discharged home with the following instructions:  Weightbearing as tolerates, ice the knee p.r.n. pain and swelling, keep incision clean and dry and continue with physical therapy per total knee arthroplasty protocol. Orthopedic medications at time of discharge included Lovenox subcutaneous injections 40 mg subcutaneously daily for 10 days, acetaminophen 650 mg by mouth every 4 hours as needed for mild pain and hydromorphone 2-4 mg by mouth every 3 hours as needed for moderate to severe pain.    FOLLOWUP:  This patient will follow up at [facility] in 2 weeks.    Valentín Beatty MD    As Dictated by JUAN CARLOS  MASON MARCANO PA-C        D: 2022   T: 2022   MT: AMARILIS    Name:     SUSANA COLUNGA  MRN:      [id number]        Account:      [id number]   :      1975           Service Date: 2022                                  Discharge Date: 2022     Document: [id number]

## 2022-12-22 DIAGNOSIS — G43.109 MIGRAINE WITH AURA AND WITHOUT STATUS MIGRAINOSUS, NOT INTRACTABLE: ICD-10-CM

## 2022-12-22 DIAGNOSIS — G43.109 MIGRAINE WITH AURA AND WITHOUT STATUS MIGRAINOSUS, NOT INTRACTABLE: Primary | ICD-10-CM

## 2022-12-22 RX ORDER — TOPIRAMATE 100 MG/1
100 TABLET, FILM COATED ORAL AT BEDTIME
Qty: 30 TABLET | Refills: 11 | Status: SHIPPED | OUTPATIENT
Start: 2022-12-22 | End: 2024-01-24

## 2022-12-25 ENCOUNTER — HOSPITAL ENCOUNTER (EMERGENCY)
Facility: CLINIC | Age: 47
Discharge: HOME | End: 2022-12-25
Attending: EMERGENCY MEDICINE | Admitting: EMERGENCY MEDICINE
Payer: COMMERCIAL

## 2022-12-25 ENCOUNTER — APPOINTMENT (OUTPATIENT)
Dept: CT IMAGING | Facility: CLINIC | Age: 47
End: 2022-12-25
Attending: EMERGENCY MEDICINE
Payer: COMMERCIAL

## 2022-12-25 VITALS
DIASTOLIC BLOOD PRESSURE: 96 MMHG | OXYGEN SATURATION: 100 % | SYSTOLIC BLOOD PRESSURE: 132 MMHG | TEMPERATURE: 97.6 F | RESPIRATION RATE: 16 BRPM | HEART RATE: 94 BPM

## 2022-12-25 DIAGNOSIS — K59.03 THERAPEUTIC OPIOID INDUCED CONSTIPATION: ICD-10-CM

## 2022-12-25 DIAGNOSIS — E87.6 HYPOKALEMIA: ICD-10-CM

## 2022-12-25 DIAGNOSIS — T40.2X5A THERAPEUTIC OPIOID INDUCED CONSTIPATION: ICD-10-CM

## 2022-12-25 LAB
ALBUMIN SERPL BCG-MCNC: 5.1 G/DL (ref 3.5–5.2)
ALP SERPL-CCNC: 111 U/L (ref 35–104)
ALT SERPL W P-5'-P-CCNC: 22 U/L (ref 10–35)
ANION GAP SERPL CALCULATED.3IONS-SCNC: 16 MMOL/L (ref 7–15)
AST SERPL W P-5'-P-CCNC: 25 U/L (ref 10–35)
BASOPHILS # BLD AUTO: 0 10E3/UL (ref 0–0.2)
BASOPHILS NFR BLD AUTO: 1 %
BILIRUB SERPL-MCNC: 0.5 MG/DL
BUN SERPL-MCNC: 10.6 MG/DL (ref 6–20)
CALCIUM SERPL-MCNC: 10.3 MG/DL (ref 8.6–10)
CHLORIDE SERPL-SCNC: 98 MMOL/L (ref 98–107)
CREAT SERPL-MCNC: 0.71 MG/DL (ref 0.51–0.95)
DEPRECATED HCO3 PLAS-SCNC: 25 MMOL/L (ref 22–29)
EOSINOPHIL # BLD AUTO: 0.1 10E3/UL (ref 0–0.7)
EOSINOPHIL NFR BLD AUTO: 1 %
ERYTHROCYTE [DISTWIDTH] IN BLOOD BY AUTOMATED COUNT: 13.5 % (ref 10–15)
GFR SERPL CREATININE-BSD FRML MDRD: >90 ML/MIN/1.73M2
GLUCOSE SERPL-MCNC: 88 MG/DL (ref 70–99)
HCT VFR BLD AUTO: 41.5 % (ref 35–47)
HGB BLD-MCNC: 13.5 G/DL (ref 11.7–15.7)
HOLD SPECIMEN: NORMAL
HOLD SPECIMEN: NORMAL
IMM GRANULOCYTES # BLD: 0 10E3/UL
IMM GRANULOCYTES NFR BLD: 0 %
LIPASE SERPL-CCNC: 10 U/L (ref 13–60)
LYMPHOCYTES # BLD AUTO: 2 10E3/UL (ref 0.8–5.3)
LYMPHOCYTES NFR BLD AUTO: 33 %
MAGNESIUM SERPL-MCNC: 2.1 MG/DL (ref 1.7–2.3)
MCH RBC QN AUTO: 30.8 PG (ref 26.5–33)
MCHC RBC AUTO-ENTMCNC: 32.5 G/DL (ref 31.5–36.5)
MCV RBC AUTO: 95 FL (ref 78–100)
MONOCYTES # BLD AUTO: 0.2 10E3/UL (ref 0–1.3)
MONOCYTES NFR BLD AUTO: 4 %
NEUTROPHILS # BLD AUTO: 3.7 10E3/UL (ref 1.6–8.3)
NEUTROPHILS NFR BLD AUTO: 61 %
NRBC # BLD AUTO: 0 10E3/UL
NRBC BLD AUTO-RTO: 0 /100
PLATELET # BLD AUTO: 314 10E3/UL (ref 150–450)
POTASSIUM SERPL-SCNC: 2.7 MMOL/L (ref 3.4–5.3)
POTASSIUM SERPL-SCNC: 3.1 MMOL/L (ref 3.4–5.3)
PROT SERPL-MCNC: 8.3 G/DL (ref 6.4–8.3)
RBC # BLD AUTO: 4.39 10E6/UL (ref 3.8–5.2)
SODIUM SERPL-SCNC: 139 MMOL/L (ref 136–145)
WBC # BLD AUTO: 6 10E3/UL (ref 4–11)

## 2022-12-25 PROCEDURE — 83690 ASSAY OF LIPASE: CPT | Performed by: EMERGENCY MEDICINE

## 2022-12-25 PROCEDURE — 80053 COMPREHEN METABOLIC PANEL: CPT | Performed by: EMERGENCY MEDICINE

## 2022-12-25 PROCEDURE — 96372 THER/PROPH/DIAG INJ SC/IM: CPT | Performed by: EMERGENCY MEDICINE

## 2022-12-25 PROCEDURE — 74177 CT ABD & PELVIS W/CONTRAST: CPT

## 2022-12-25 PROCEDURE — 85025 COMPLETE CBC W/AUTO DIFF WBC: CPT | Performed by: EMERGENCY MEDICINE

## 2022-12-25 PROCEDURE — 36415 COLL VENOUS BLD VENIPUNCTURE: CPT | Performed by: EMERGENCY MEDICINE

## 2022-12-25 PROCEDURE — 250N000011 HC RX IP 250 OP 636: Performed by: EMERGENCY MEDICINE

## 2022-12-25 PROCEDURE — 96365 THER/PROPH/DIAG IV INF INIT: CPT | Mod: 59 | Performed by: EMERGENCY MEDICINE

## 2022-12-25 PROCEDURE — 84132 ASSAY OF SERUM POTASSIUM: CPT | Performed by: EMERGENCY MEDICINE

## 2022-12-25 PROCEDURE — 99285 EMERGENCY DEPT VISIT HI MDM: CPT | Mod: 25 | Performed by: EMERGENCY MEDICINE

## 2022-12-25 PROCEDURE — 83735 ASSAY OF MAGNESIUM: CPT | Performed by: EMERGENCY MEDICINE

## 2022-12-25 RX ORDER — POTASSIUM CHLORIDE 7.45 MG/ML
10 INJECTION INTRAVENOUS
Status: DISPENSED | OUTPATIENT
Start: 2022-12-25 | End: 2022-12-25

## 2022-12-25 RX ORDER — IOPAMIDOL 755 MG/ML
500 INJECTION, SOLUTION INTRAVASCULAR ONCE
Status: COMPLETED | OUTPATIENT
Start: 2022-12-25 | End: 2022-12-25

## 2022-12-25 RX ORDER — POTASSIUM CHLORIDE 1500 MG/1
40 TABLET, EXTENDED RELEASE ORAL ONCE
Status: DISCONTINUED | OUTPATIENT
Start: 2022-12-25 | End: 2022-12-25 | Stop reason: HOSPADM

## 2022-12-25 RX ORDER — SODIUM CHLORIDE 9 MG/ML
INJECTION, SOLUTION INTRAVENOUS CONTINUOUS
Status: DISCONTINUED | OUTPATIENT
Start: 2022-12-25 | End: 2022-12-25 | Stop reason: HOSPADM

## 2022-12-25 RX ORDER — METOCLOPRAMIDE 10 MG/1
10 TABLET ORAL 3 TIMES DAILY PRN
Qty: 20 TABLET | Refills: 0 | Status: SHIPPED | OUTPATIENT
Start: 2022-12-25 | End: 2023-02-18

## 2022-12-25 RX ORDER — POTASSIUM CHLORIDE 1500 MG/1
20 TABLET, EXTENDED RELEASE ORAL 2 TIMES DAILY
Qty: 14 TABLET | Refills: 0 | Status: SHIPPED | OUTPATIENT
Start: 2022-12-25 | End: 2023-01-01

## 2022-12-25 RX ADMIN — METHYLNALTREXONE BROMIDE 12 MG: 12 INJECTION, SOLUTION SUBCUTANEOUS at 13:32

## 2022-12-25 RX ADMIN — POTASSIUM CHLORIDE 10 MEQ: 7.46 INJECTION, SOLUTION INTRAVENOUS at 12:31

## 2022-12-25 RX ADMIN — IOPAMIDOL 75 ML: 755 INJECTION, SOLUTION INTRAVENOUS at 10:09

## 2022-12-25 ASSESSMENT — ACTIVITIES OF DAILY LIVING (ADL)
ADLS_ACUITY_SCORE: 35
ADLS_ACUITY_SCORE: 35
ADLS_ACUITY_SCORE: 33
ADLS_ACUITY_SCORE: 35

## 2022-12-25 ASSESSMENT — ENCOUNTER SYMPTOMS
VOMITING: 1
FEVER: 0
SHORTNESS OF BREATH: 0
NAUSEA: 1
CONSTIPATION: 1
CHILLS: 0
ABDOMINAL PAIN: 1

## 2022-12-25 NOTE — ED TRIAGE NOTES
Patient presents with complaints of not having a bowel movement or passing gas for one week. Patent also vomiting as well. ABC intact without need for intervention at this time.

## 2022-12-25 NOTE — ED PROVIDER NOTES
"  History   Chief Complaint:  Constipation       The history is provided by the patient.      Leola Uriostegui is a 47 year old female on Eliquis with history of hypertension and hyperlipidemia who presents with constipation. Patient reports that she had a total knee replacement on 11/15/22, after which she had acid reflux followed by decreased appetite and persistent constipation. She states that she had a bowel movement last week but still felt as thought she did not get all of the stool out. She adds that she tried a suppository with no resolve and did not feel stool when she inserted it. She endorses vomiting x6 since her surgery and left sided abdominal \"thobbing\" sensation. She reports that she has had a surgery for Meckel's diverticulum. She adds that if she feels her abdomen she can \"feel stool in her colon\". She reports that she saw her GI doctor recently regarding her constipation who told her to drink smoothies daily and return for a follow up in 1 month. She denies tobacco or alcohol use or recent CT imaging. He reports taking Morphine and Dilaudid for pain management post surgery, with stool softeners.      Review of Systems   Constitutional: Negative for chills and fever.   Respiratory: Negative for shortness of breath.    Cardiovascular: Negative for chest pain.   Gastrointestinal: Positive for abdominal pain, constipation, nausea and vomiting.       Allergies:  Cephalexin  Cephalosporins  Clindamycin  Covid-19 (Mrna) Vaccine  Doxycycline  Hydromorphone  Lorazepam  Nitrofurantoin  Oxycodone  Oxycodone-Acetaminophen  Sulfamethoxazole W/Trimethoprim  Tape [Adhesive Tape]    Medications:  Trazodone   Topiramate   [place]   Spironolactone   Senna docusate   Seroquel   Progesterone   Pravastatin   Pantoprazole   Ondansetron   Omeprazole   Morphine   Mirtazapine   Metoprolol   Linaclotide    Hydroxyzine    Hydromorphone   Dicyclomine   Cyclobenzaprine   Buspirone   Atomoxetine   Eliquis   Amitriptyline "     Past Medical History:     Bipolar 1 disorder   DDD   Endometriosis   Esophageal reflux   Hypertension   Gastrointestinal hemorrhage   Hyperlipidemia   Incisional hernia  Intractable persistent migraine   Anemia   IBS   Left foot drop   Left sided weakness    Ophthalmoplegic migraine   Opioid dependence   Plantar fascial fibromatosis   Spinal stenosis of cervical region   Syncope   Tear of medial cartilage   Peripheral insufficiency     Past Surgical History:    Bowel resection x3  Appendectomy   Arthroplasty knee   Cervical fusion   Bunionectomy    Cholecystgastrostomy    Distal biceps tendon repair   Tonsillectomy and adenoidectomy   Strabismus   Bladder repair   EGD x2   Hernia repair   Hysterectomy    Meniscus repair     Family History:    Mother- melanoma, hyperlipidemia, hypertension, depression   Father- diabetes, heart disease, hyperlipidemia, hypertension, stroke     Social History:  Presents alone   Presents via private vehicle   PCP: Vandana Kearns     Physical Exam     Patient Vitals for the past 24 hrs:   BP Temp Pulse Resp SpO2   12/25/22 1504 -- -- -- -- 100 %   12/25/22 1449 -- -- -- -- 97 %   12/25/22 1434 -- -- -- -- 100 %   12/25/22 1419 -- -- -- -- 100 %   12/25/22 1404 -- -- -- -- 100 %   12/25/22 1349 -- -- -- -- 99 %   12/25/22 1334 -- -- -- -- 100 %   12/25/22 1321 -- -- -- -- 100 %   12/25/22 1319 -- -- -- -- 100 %   12/25/22 1306 -- -- -- -- 99 %   12/25/22 1304 -- -- -- -- 100 %   12/25/22 1251 -- -- -- -- 99 %   12/25/22 1249 -- -- -- -- 100 %   12/25/22 1236 -- -- -- -- 99 %   12/25/22 1234 -- -- -- -- 99 %   12/25/22 1221 -- -- -- -- 100 %   12/25/22 1219 -- -- -- -- 100 %   12/25/22 1206 -- -- -- -- 99 %   12/25/22 1204 -- -- -- -- 100 %   12/25/22 1151 -- -- -- -- 99 %   12/25/22 1149 -- -- -- -- 100 %   12/25/22 1136 -- -- -- -- 100 %   12/25/22 1134 -- -- -- -- 100 %   12/25/22 1121 -- -- -- -- 100 %   12/25/22 1119 -- -- -- -- 100 %   12/25/22 1106 -- -- -- -- (!) 57 %    12/25/22 1036 -- -- -- -- 100 %   12/25/22 1021 -- -- -- -- 100 %   12/25/22 1006 130/82 -- -- -- --   12/25/22 0735 (!) 132/92 97.6  F (36.4  C) 96 18 100 %       Physical Exam  Constitutional: Well developed, nontox appearance  Head: Atraumatic.   Neck:  no stridor  Eyes: no scleral icterus  Cardiovascular: RRR, 2+ bilat radial pulses  Pulmonary/Chest: nml resp effort  Abdominal: ND, soft, RLQ tenderness, no rebound or guarding   : no CVA tenderness bilat  Ext: Warm, well perfused, no edema  Neurological: A&O, symmetric facies, moves ext x4  Skin: Skin is warm and dry.   Psychiatric: Behavior is normal. Thought content normal.   Nursing note and vitals reviewed.    Emergency Department Course   Imaging:  CT Abdomen Pelvis w Contrast   Final Result   IMPRESSION:    1.  No acute findings in the abdomen and pelvis.   2.  Large amount of formed stool in the colon.   3.  Cholecystectomy. Mild central intrahepatic and extrahepatic biliary ductal dilatation may be related to postcholecystectomy reservoir effect. If clinically indicated, a follow-up MRI/MRCP on a nonemergent basis can be considered for further    evaluation.        Report per radiology    Laboratory:  Labs Ordered and Resulted from Time of ED Arrival to Time of ED Departure   COMPREHENSIVE METABOLIC PANEL - Abnormal       Result Value    Sodium 139      Potassium 2.7 (*)     Chloride 98      Carbon Dioxide (CO2) 25      Anion Gap 16 (*)     Urea Nitrogen 10.6      Creatinine 0.71      Calcium 10.3 (*)     Glucose 88      Alkaline Phosphatase 111 (*)     AST 25      ALT 22      Protein Total 8.3      Albumin 5.1      Bilirubin Total 0.5      GFR Estimate >90     LIPASE - Abnormal    Lipase 10 (*)    POTASSIUM - Abnormal    Potassium 3.1 (*)    MAGNESIUM - Normal    Magnesium 2.1     CBC WITH PLATELETS AND DIFFERENTIAL    WBC Count 6.0      RBC Count 4.39      Hemoglobin 13.5      Hematocrit 41.5      MCV 95      MCH 30.8      MCHC 32.5      RDW 13.5       Platelet Count 314      % Neutrophils 61      % Lymphocytes 33      % Monocytes 4      % Eosinophils 1      % Basophils 1      % Immature Granulocytes 0      NRBCs per 100 WBC 0      Absolute Neutrophils 3.7      Absolute Lymphocytes 2.0      Absolute Monocytes 0.2      Absolute Eosinophils 0.1      Absolute Basophils 0.0      Absolute Immature Granulocytes 0.0      Absolute NRBCs 0.0        Emergency Department Course:     Reviewed:  I reviewed nursing notes, vitals, past medical history and Care Everywhere    Assessments:  0811 I obtained history and examined the patient as noted above.   1039 I rechecked the patient and explained findings.     Interventions:  1231 Potassium chloride 10mEq IV  1332 Methylnaltrexone 12mg Subcutaneous     Disposition:  The patient was discharged to home.     Impression & Plan   Medical Decision Makin year old female presenting w/ abdominal discomfort, constipation    DDx includes constipation, opioid-induced constipation, ileus, bowel obstruction, intra-abdominal mass.  Doubt vascular catastrophe given history and physical exam.  Labs significant for hypokalemia, mild hypercalcemia.  Imaging sig for large amount of colonic stool otherwise no acute abnormalities.  Potassium repletion emergency department.  Interventions as noted above including Restoril with significant improvement in the patient's constipation.  She had multiple bowel movements in the emergency department and reports feeling improved.  Potassium recheck demonstrated improved level.  Given reassuring work-up and improvement during ED course, at this time I feel the pt is safe for discharge.  Prescriptions written as noted below for outpatient management.  Patient is already on a stool regimen therefore no further prescriptions were provided. Recommendations given regarding follow up with PCP, previous gastroenterologist and return to the emergency department as needed for new or worsening symptoms.  Patient  counseled on all results, disposition and diagnosis.  They are understanding and agreeable to plan. Patient discharged in stable condition.       Diagnosis:    ICD-10-CM    1. Therapeutic opioid induced constipation  K59.03     T40.2X5A       2. Hypokalemia  E87.6           Discharge Medications:  New Prescriptions    POTASSIUM CHLORIDE ER (K-TAB) 20 MEQ CR TABLET    Take 1 tablet (20 mEq) by mouth 2 times daily for 7 days       Scribe Disclosure:  ICorazon, am serving as a scribe at 8:10 AM on 12/25/2022 to document services personally performed by Jerry Walker MD based on my observations and the provider's statements to me.          Jerry Walker MD  12/25/22 9369

## 2022-12-25 NOTE — ED NOTES
Pt reports small BM in the restroom and improvement in nausea but does not feel as though she has emptied her bowels.

## 2022-12-29 ENCOUNTER — HOSPITAL ENCOUNTER (EMERGENCY)
Facility: CLINIC | Age: 47
Discharge: HOME | End: 2022-12-30
Attending: EMERGENCY MEDICINE | Admitting: EMERGENCY MEDICINE
Payer: COMMERCIAL

## 2022-12-29 ENCOUNTER — APPOINTMENT (OUTPATIENT)
Dept: CT IMAGING | Facility: CLINIC | Age: 47
End: 2022-12-29
Attending: EMERGENCY MEDICINE
Payer: COMMERCIAL

## 2022-12-29 DIAGNOSIS — K59.00 CONSTIPATION, UNSPECIFIED CONSTIPATION TYPE: ICD-10-CM

## 2022-12-29 DIAGNOSIS — R11.2 NAUSEA AND VOMITING, UNSPECIFIED VOMITING TYPE: ICD-10-CM

## 2022-12-29 LAB
ALBUMIN SERPL BCG-MCNC: 5.4 G/DL (ref 3.5–5.2)
ALP SERPL-CCNC: 116 U/L (ref 35–104)
ALT SERPL W P-5'-P-CCNC: 24 U/L (ref 10–35)
ANION GAP SERPL CALCULATED.3IONS-SCNC: 15 MMOL/L (ref 7–15)
AST SERPL W P-5'-P-CCNC: 25 U/L (ref 10–35)
BILIRUB SERPL-MCNC: 0.5 MG/DL
BUN SERPL-MCNC: 7.7 MG/DL (ref 6–20)
CALCIUM SERPL-MCNC: 10.4 MG/DL (ref 8.6–10)
CHLORIDE SERPL-SCNC: 97 MMOL/L (ref 98–107)
CREAT SERPL-MCNC: 0.6 MG/DL (ref 0.51–0.95)
DEPRECATED HCO3 PLAS-SCNC: 27 MMOL/L (ref 22–29)
ERYTHROCYTE [DISTWIDTH] IN BLOOD BY AUTOMATED COUNT: 13.2 % (ref 10–15)
FLUAV RNA SPEC QL NAA+PROBE: NEGATIVE
FLUBV RNA RESP QL NAA+PROBE: NEGATIVE
GFR SERPL CREATININE-BSD FRML MDRD: >90 ML/MIN/1.73M2
GLUCOSE SERPL-MCNC: 108 MG/DL (ref 70–99)
HCG SERPL QL: NEGATIVE
HCT VFR BLD AUTO: 47.6 % (ref 35–47)
HGB BLD-MCNC: 15 G/DL (ref 11.7–15.7)
HOLD SPECIMEN: NORMAL
LIPASE SERPL-CCNC: 13 U/L (ref 13–60)
MCH RBC QN AUTO: 30.5 PG (ref 26.5–33)
MCHC RBC AUTO-ENTMCNC: 31.5 G/DL (ref 31.5–36.5)
MCV RBC AUTO: 97 FL (ref 78–100)
PLATELET # BLD AUTO: 399 10E3/UL (ref 150–450)
POTASSIUM SERPL-SCNC: 3.8 MMOL/L (ref 3.4–5.3)
PROT SERPL-MCNC: 8.9 G/DL (ref 6.4–8.3)
RBC # BLD AUTO: 4.91 10E6/UL (ref 3.8–5.2)
RSV RNA SPEC NAA+PROBE: NEGATIVE
SARS-COV-2 RNA RESP QL NAA+PROBE: NEGATIVE
SODIUM SERPL-SCNC: 139 MMOL/L (ref 136–145)
WBC # BLD AUTO: 7.2 10E3/UL (ref 4–11)

## 2022-12-29 PROCEDURE — 74177 CT ABD & PELVIS W/CONTRAST: CPT

## 2022-12-29 PROCEDURE — 250N000011 HC RX IP 250 OP 636: Performed by: EMERGENCY MEDICINE

## 2022-12-29 PROCEDURE — 96361 HYDRATE IV INFUSION ADD-ON: CPT

## 2022-12-29 PROCEDURE — 80053 COMPREHEN METABOLIC PANEL: CPT | Performed by: EMERGENCY MEDICINE

## 2022-12-29 PROCEDURE — C9803 HOPD COVID-19 SPEC COLLECT: HCPCS

## 2022-12-29 PROCEDURE — 85027 COMPLETE CBC AUTOMATED: CPT | Performed by: EMERGENCY MEDICINE

## 2022-12-29 PROCEDURE — 84703 CHORIONIC GONADOTROPIN ASSAY: CPT | Performed by: EMERGENCY MEDICINE

## 2022-12-29 PROCEDURE — 258N000003 HC RX IP 258 OP 636: Performed by: EMERGENCY MEDICINE

## 2022-12-29 PROCEDURE — 99285 EMERGENCY DEPT VISIT HI MDM: CPT | Mod: 25

## 2022-12-29 PROCEDURE — 250N000009 HC RX 250: Performed by: EMERGENCY MEDICINE

## 2022-12-29 PROCEDURE — 87637 SARSCOV2&INF A&B&RSV AMP PRB: CPT | Performed by: EMERGENCY MEDICINE

## 2022-12-29 PROCEDURE — 36415 COLL VENOUS BLD VENIPUNCTURE: CPT | Performed by: EMERGENCY MEDICINE

## 2022-12-29 PROCEDURE — 83690 ASSAY OF LIPASE: CPT | Performed by: EMERGENCY MEDICINE

## 2022-12-29 PROCEDURE — 96374 THER/PROPH/DIAG INJ IV PUSH: CPT | Mod: 59

## 2022-12-29 RX ORDER — ONDANSETRON 2 MG/ML
4 INJECTION INTRAMUSCULAR; INTRAVENOUS ONCE
Status: COMPLETED | OUTPATIENT
Start: 2022-12-29 | End: 2022-12-29

## 2022-12-29 RX ORDER — IOPAMIDOL 755 MG/ML
500 INJECTION, SOLUTION INTRAVASCULAR ONCE
Status: COMPLETED | OUTPATIENT
Start: 2022-12-29 | End: 2022-12-29

## 2022-12-29 RX ADMIN — SODIUM CHLORIDE 59 ML: 9 INJECTION, SOLUTION INTRAVENOUS at 22:14

## 2022-12-29 RX ADMIN — SODIUM CHLORIDE 1000 ML: 9 INJECTION, SOLUTION INTRAVENOUS at 19:56

## 2022-12-29 RX ADMIN — ONDANSETRON HYDROCHLORIDE 4 MG: 2 INJECTION, SOLUTION INTRAMUSCULAR; INTRAVENOUS at 19:56

## 2022-12-29 RX ADMIN — IOPAMIDOL 75 ML: 755 INJECTION, SOLUTION INTRAVENOUS at 22:14

## 2022-12-29 ASSESSMENT — ACTIVITIES OF DAILY LIVING (ADL)
ADLS_ACUITY_SCORE: 35
ADLS_ACUITY_SCORE: 33

## 2022-12-29 ASSESSMENT — ENCOUNTER SYMPTOMS
ABDOMINAL PAIN: 0
NAUSEA: 1
VOMITING: 1
CONSTIPATION: 1
UNEXPECTED WEIGHT CHANGE: 1

## 2022-12-30 VITALS
HEART RATE: 107 BPM | SYSTOLIC BLOOD PRESSURE: 125 MMHG | TEMPERATURE: 97.3 F | OXYGEN SATURATION: 98 % | RESPIRATION RATE: 20 BRPM | DIASTOLIC BLOOD PRESSURE: 105 MMHG

## 2022-12-30 ASSESSMENT — ENCOUNTER SYMPTOMS
POLYDIPSIA: 0
CHEST TIGHTNESS: 0
CHILLS: 0
FEVER: 0
SHORTNESS OF BREATH: 0
FLANK PAIN: 0
POLYPHAGIA: 0

## 2022-12-30 NOTE — ED PROVIDER NOTES
"  History   Chief Complaint:  Vomiting       The history is provided by the patient.      Leola Uriostegui is a 47 year old female, 2 months s/p left knee arthroplasty, with history of hypertension, hyperlipidemia, PE, IBS, GERD, and colitis who presents with vomiting and low appetite for 5 days. She reports she hasn't kept any food down in that time. She reports she has been producing a yellow liquid, but reports the last episode of emesis produced a fluorescent green \"bile.\" Reports constipation, with no bowel movements since Saturday. She also notes dramatic weight loss, from 140 lbs a week ago to 120 lbs today. Denies abdominal pain in the ED. She has taken oral Zofran to treat nausea. She reports she has plans to see her GI Tuesday. She notes use of opioids following surgery, which she stopped Saturday out of concern they were causing the vomiting. Also notes Hx of ulcers.    Review of Systems   Constitutional: Positive for unexpected weight change. Negative for chills and fever.   HENT: Negative for congestion.    Respiratory: Negative for chest tightness and shortness of breath.    Cardiovascular: Negative for chest pain.   Gastrointestinal: Positive for constipation, nausea and vomiting. Negative for abdominal pain.   Endocrine: Negative for polydipsia, polyphagia and polyuria.   Genitourinary: Negative for flank pain.   All other systems reviewed and are negative.    Allergies:  Cephalexin  Cephalosporins  Clindamycin  Covid-19 (Mrna) Vaccine  Doxycycline  Hydromorphone  Lorazepam  Nitrofurantoin  Oxycodone  Oxycodone-Acetaminophen  Sulfamethoxazole W/Trimethoprim  Tape [Adhesive Tape]   Covid-19 vaccine ([employer], AstrScary Mommy)    Medications:  Elavil  Eliquis  Strattera  Buspar  Flexeril  Bentyl  Atarax  Vistaril  Linzess  Reglan  Lopressor  Remeron  Prilosec  Zofran  Protonix  K-tab  Pravachol  Prometrium  Seroquel  Aldactone  [place]  Topamax  Desyrel  Levbid    Past Medical History:     Allergic " rhinitis  Bipolar I disorder  Chronic pain  DDD  Endometriosis  GERD  Hypertension   GI bleed  Hyperlipidemia   Incisional hernia with obstruction but no gangrene  Iron deficiency anemia  IBS  Left foot drop  Opthalmoplegic migraine  Opioid dependence  Plantar fascitis  Premature menopause  Spinal stenosis of cervical region  Tear of medial cartilage or meniscus of knee  Venous insufficiency   PE  Pruritis  UTI    Past Surgical History:    Bowel resection x3  Appendectomy   Knee arthroplasty   Cervical fusion  Bunionectomy  Cholecystectomy   Colonoscopy   Distal biceps tendon repair, left  T&A  Strabismus bilateral   Bladder repair  Esophagogastroduodenoscopy   Herniorrhaphy   Hysterectomy   Laparoscopy  Repair of meniscus  Knee arthroscopy      Family History:    Father- diabetes mellitus, heart disease, hyperlipidemia, hypertension, cataract, stroke  Mother- melanoma, hyperlipidemia, hypertension, depression     Social History:  The patient presents to the ED alone via private vehicle.  PCP: Vandana Kearns     Physical Exam     Patient Vitals for the past 24 hrs:   BP Temp Temp src Pulse Resp SpO2   12/29/22 1945 (!) 143/105 97.3  F (36.3  C) Temporal 119 20 94 %       Physical Exam  Constitutional: Patient is well appearing. No distress.  Head: Atraumatic.  Mouth/Throat: Oropharynx is clear and moist. No oropharyngeal exudate.  Eyes: Conjunctivae and  are normal. No scleral icterus.  Neck: Normal range of motion. Neck supple.   Cardiovascular: Normal rate, regular rhythm, normal heart sounds and intact distal perfusion.   Pulmonary/Chest: Breath sounds normal. No respiratory distress.  Abdominal: Soft. Bowel sounds are normal. No distension. No tenderness. No rebound or guarding.   Musculoskeletal: Normal range of motion. No edema or tenderness.   Neurological: Alert and orientated to person, place, and time. No observable focal neuro deficit  Skin: Warm and dry. No rash noted. Not diaphoretic.       Emergency  Department Course   Imaging:  CT Abdomen Pelvis w Contrast   Final Result   IMPRESSION:    1.  Possible constipation.   2.  Mild intrahepatic and extrahepatic biliary ductal dilatation, unchanged from recent prior, though progressed from 2019. This may be attributable to the postcholecystectomy state, though given the provided clinical history, correlation with LFTs and    MRCP imaging, is recommended.   3.  Hepatic steatosis.              Report per radiology    Laboratory:  Labs Ordered and Resulted from Time of ED Arrival to Time of ED Departure   CBC WITH PLATELETS - Abnormal       Result Value    WBC Count 7.2      RBC Count 4.91      Hemoglobin 15.0      Hematocrit 47.6 (*)     MCV 97      MCH 30.5      MCHC 31.5      RDW 13.2      Platelet Count 399     COMPREHENSIVE METABOLIC PANEL - Abnormal    Sodium 139      Potassium 3.8      Chloride 97 (*)     Carbon Dioxide (CO2) 27      Anion Gap 15      Urea Nitrogen 7.7      Creatinine 0.60      Calcium 10.4 (*)     Glucose 108 (*)     Alkaline Phosphatase 116 (*)     AST 25      ALT 24      Protein Total 8.9 (*)     Albumin 5.4 (*)     Bilirubin Total 0.5      GFR Estimate >90     HCG QUALITATIVE PREGNANCY - Normal    hCG Serum Qualitative Negative     INFLUENZA A/B & SARS-COV2 PCR MULTIPLEX - Normal    Influenza A PCR Negative      Influenza B PCR Negative      RSV PCR Negative      SARS CoV2 PCR Negative     LIPASE - Normal    Lipase 13     ROUTINE UA WITH MICROSCOPIC REFLEX TO CULTURE      Emergency Department Course:       Reviewed:  I reviewed nursing notes, vitals, past medical history and Care Everywhere    Assessments:  2130 I obtained history and examined the patient as noted above.   0010 I rechecked the patient and explained findings. They were amenable to plan for discharge.     Interventions:  1956 NS 1L IV  1956 Zofran 4 mg IV    Disposition:  The patient was discharged to home.     Impression & Plan   Medical Decision Making:  This patient presented  to the Emergency Department with nausea and vomiting.  The clinical exam today is non-specific and non-focal and non-surgical.  The laboratory testing has returned normal.  Imaging is normal pther than constipation and post sulma state.  The exact etiology of the nausea is not clear.  The differential diagnosis of nausea is protean and includes:  Bowel Obstruction, Ulcer, Ischemia, Cholecystitis, Diverticulitis, Pancreatitis, UTI, Pyelonephritis, Enteritis/Colitis, amongst many other etiologies.  The sequela of vomiting includes electrolyte abnormalities and dehydration.  Fluids were given and labs were normal.  The history, physical exam, and results detect no life threatening cause at this time, nor do they indicate the patient is currently suffering from one of the previously mentioned conditions.  Unfortunately a clear exam and results today do not ensure freedom from a severe disease process in the future-- even within hours, or the possibility that there is a dangerous process currently at work but currently undetected or undiagnosed.  For this reason the patient is advised to seek immediate re-evaluation in the the ED if there is a worsening of the condition, and to be seen by a more consistent care-giver, such as their PCP, if the symptoms persist more than one day.  These instructions were clearly stated and were repeated back to me by a competent patient who agreed to them.      Critical Care Time: was 0 minutes for this patient excluding procedures    Diagnosis:    ICD-10-CM    1. Constipation, unspecified constipation type  K59.00       2. Nausea and vomiting, unspecified vomiting type  R11.2           Discharge Medications:  New Prescriptions    No medications on file       Scribe Disclosure:  ANGELA, Mercy Daniels, am serving as a scribe at 9:29 PM on 12/29/2022 to document services personally performed by Ashvin Nath MD based on my observations and the provider's statements to me.          [place]  Ashvin BURNETTE MD  12/30/22 0048

## 2022-12-30 NOTE — ED TRIAGE NOTES
Intermittent vomiting since surgery on 11/5/22 when pt had knee surgery. Pt has since lost 20lbs. Last potassium 2.6.    Triage Assessment     Row Name 12/29/22 1944       Triage Assessment (Adult)    Airway WDL WDL       Cognitive/Neuro/Behavioral WDL    Cognitive/Neuro/Behavioral WDL WDL

## 2023-01-06 PROCEDURE — 88305 TISSUE EXAM BY PATHOLOGIST: CPT | Performed by: PATHOLOGY

## 2023-01-09 ENCOUNTER — LAB REQUISITION (OUTPATIENT)
Dept: LAB | Facility: CLINIC | Age: 48
End: 2023-01-09

## 2023-01-09 DIAGNOSIS — R12 HEARTBURN: ICD-10-CM

## 2023-01-09 DIAGNOSIS — K31.89 OTHER DISEASES OF STOMACH AND DUODENUM: ICD-10-CM

## 2023-01-19 NOTE — LETTER
"2017    RE: Leola Uriostegui  : 1975  MRN: [id number]    Dear Ms. Kearns,    Thank you for referring your patient, Leola Uriostegui, to my neuro-ophthalmology clinic recently.  After a thorough neuro-ophthalmic history and examination, I came to the following conclusions:     1. Atypical migraine visual aura  2. Complex migraines    Leola Uriostegui is a pleasant 42 year old White female with a history of bipolar disorder who presents to my neuro-ophthalmology clinic today for evaluation of migraines. She reports onset of symptoms in . She denies ever having headache during these episodes but does report having blurred vision, balance problems, garbled speech, poor motor control, and dilated pupils in both eyes. She doesn't always remember the episodes. In , she only had one episode of these symptoms which reportedly lasted all day. She reports having an MRI at that time which showed mild nonspecific T2 and FLAIR signal hyperintensity in white matter of both cerebral hemispheres. She was seen by a neurologist, who reportedly told her that the spots were normal for her age. She has a history of biopar disorder and the neurologist switched her Seroquel, Lamictal, and premphase from daytime to nighttime dosing, but the patient feels that this didn't help.    By , the episodes had increased in frequency to about twice a month and were only brief episodes. She had another MRI which showed unchanged foci of T2/FLAIR signal hyperintensity in the periventricular and subcortical white matter of the frontal lobes bilaterally.     By , she reports having five episodes in one month, which prompted her to present to neurology for re-evaluation. She had a third MRI in 2016 which showed stable few scattered patchy foci of T2 signal within the white matter. She was started on topamax in 3/2016 by her PCP. After starting the topamax, she had no further episodes until recently. She had a \"bad " "episode\" 5 weeks ago during which her symptoms reportedly lasted 2 days and a short episode 3 weeks ago lasting 20 minutes.     The episodes tend to start in the beginning of the day but she otherwise denies any known trigger. She does get photophobia with the episodes. She typically does not have nausea, but she did have emesis during the episode 5 weeks ago. The vision gets blurry in both eyes and does not improve with closing one eye. She denies flashing lights or positive visual phenomena. She denies eye pain. The vision improves back to baseline after the episodes.     She denies any other medical problems aside from bipolar disorder. She denies any recent medication changes. She denies any other focal neurological symptoms between episodes.    She denies any family history of migraines, MS, or other neurological problems. There is a history of adult onset diabetes mellitus in her family.      Her past ocular history is significant for childhood strabismus s/p bilateral bilateral lateral rectus recession in 2007. Otherwise, she wears glasses and denies any other eye problems.     She is still taking Topamax 100 mg at bedtime. She no longer sees a neurologist as didn't feel they were helpful in the past. She has not had electroencephalography in past.     Best corrected visual acuity was 20/20 in both eyes, color vision was full in both eyes, and confrontational visual fields were full in both eyes. Pupils were large, round, and sluggishly reactive to light with no afferent pupillary defect. Motility was full and alignment was ortho. Slit lamp exam was unremarkable. Dilated fundus exam showed healthy-appearing optic nerves in both eyes. Cranial nerves V1-3, 7,8,9,11, and 12 were normal bilaterally.     MRI brain without contrast on 12/28/10 showed mild nonspecific T2 and FLAIR signal hyperintensity in white matter of both cerebral hemispheres. MRI brain without contrast on 11/19/12 showed few unchanged " non-specific scattered foci of T2/FLAIR signal hyperintensity in the periventricular and subcortical white matter of the frontal lobes bilaterally. CT head without contrast on 4/25/15 showed no intracranial pathology. MRI brain without contrast on 2/26/16 showed stable few scattered patchy foci of T2 signal within the white matter consistent with small-vessel ischemic changes or sequela of migraine headache. Carotid ultrasound on 5/14/16 showed no hemodynamically significant stenosis and vertebral arteries antegrade bilaterally.     Overall, the patient is a 42 year old female with history of bipolar disorder who presents for evaluation of transient episodes of neurological symptoms including blurred vision, balance problems, garbled speech, poor motor control, and dilated pupils. She often does not remember the episodes. She reports onset of rare episodes in 12/2010 and increased freqency of episodes up until 2/2016, when she was started on topamax. She estimates having a total of 15 episodes since onset. The topamax was helping until about 2 months ago, when the episodes returned. She has history of bipolar disorder for which she takes seroquil, lamictal, and premphase with no recent change in medications or dosing.  On my review of her 2016 MRI images these do not look typical of demyelinating disease and look more typical of microvascular changes or migraine.     Her symptoms are concerning for complex migraine vs seizure. Her description of the duration of the episodes makes seizure less likely. Her MRI appearance and history does not sound compatible with multiple sclerosis. Her symptoms are most consistent with complex migraine and dilated pupils are common in patients in the midst of migraine. She currently continues on topamax 100mg at bedtime.     The patient requested referral to general neurology for further evaluation and consideration of alternative pharmacologic migraine prophylaxis. Depending on  their degree of concern for seizure, they may consider electroencephalography testing though seizure seems highly unlikely to me.    I did not make a follow-up appointment, but I would be happy to see the patient back in the future should any new neuro-ophthalmic concern arise.      Again, thank you for trusting me with the care of your patient.  For further exam details, please feel free to contact our office for additional records.  If you wish to contact me regarding this patient please email me at [email or web address] or give my clinic a call to arrange a phone conversation.    Sincerely,    Bhaskar Wheeler MD  , Neuro-Ophthalmology and Adult Strabismus  Department of Ophthalmology and Visual Neurosciences  [facility]    DX: Complex migraine, atypical migraine visual aura       Tazorac Counseling:  Patient advised that medication is irritating and drying.  Patient may need to apply sparingly and wash off after an hour before eventually leaving it on overnight.  The patient verbalized understanding of the proper use and possible adverse effects of tazorac.  All of the patient's questions and concerns were addressed.

## 2023-02-11 ENCOUNTER — HOSPITAL ENCOUNTER (EMERGENCY)
Facility: CLINIC | Age: 48
Discharge: HOME | End: 2023-02-12
Attending: EMERGENCY MEDICINE | Admitting: EMERGENCY MEDICINE
Payer: COMMERCIAL

## 2023-02-11 ENCOUNTER — APPOINTMENT (OUTPATIENT)
Dept: CT IMAGING | Facility: CLINIC | Age: 48
End: 2023-02-11
Attending: EMERGENCY MEDICINE
Payer: COMMERCIAL

## 2023-02-11 VITALS
SYSTOLIC BLOOD PRESSURE: 140 MMHG | TEMPERATURE: 98 F | HEART RATE: 93 BPM | WEIGHT: 112.88 LBS | RESPIRATION RATE: 26 BRPM | OXYGEN SATURATION: 100 % | HEIGHT: 62 IN | BODY MASS INDEX: 20.77 KG/M2 | DIASTOLIC BLOOD PRESSURE: 99 MMHG

## 2023-02-11 DIAGNOSIS — R91.1 LUNG NODULE: ICD-10-CM

## 2023-02-11 DIAGNOSIS — E87.6 HYPOKALEMIA: ICD-10-CM

## 2023-02-11 DIAGNOSIS — R07.9 NONSPECIFIC CHEST PAIN: ICD-10-CM

## 2023-02-11 LAB
ANION GAP SERPL CALCULATED.3IONS-SCNC: 12 MMOL/L (ref 7–15)
BASOPHILS # BLD AUTO: 0 10E3/UL (ref 0–0.2)
BASOPHILS NFR BLD AUTO: 1 %
BUN SERPL-MCNC: 11.5 MG/DL (ref 6–20)
CALCIUM SERPL-MCNC: 8.8 MG/DL (ref 8.6–10)
CHLORIDE SERPL-SCNC: 102 MMOL/L (ref 98–107)
CREAT SERPL-MCNC: 0.73 MG/DL (ref 0.51–0.95)
D DIMER PPP FEU-MCNC: 1.14 UG/ML FEU (ref 0–0.5)
DEPRECATED HCO3 PLAS-SCNC: 24 MMOL/L (ref 22–29)
EOSINOPHIL # BLD AUTO: 0.1 10E3/UL (ref 0–0.7)
EOSINOPHIL NFR BLD AUTO: 1 %
ERYTHROCYTE [DISTWIDTH] IN BLOOD BY AUTOMATED COUNT: 13.9 % (ref 10–15)
GFR SERPL CREATININE-BSD FRML MDRD: >90 ML/MIN/1.73M2
GLUCOSE SERPL-MCNC: 117 MG/DL (ref 70–99)
HCT VFR BLD AUTO: 38.4 % (ref 35–47)
HGB BLD-MCNC: 12.8 G/DL (ref 11.7–15.7)
HOLD SPECIMEN: NORMAL
IMM GRANULOCYTES # BLD: 0 10E3/UL
IMM GRANULOCYTES NFR BLD: 0 %
LYMPHOCYTES # BLD AUTO: 3.8 10E3/UL (ref 0.8–5.3)
LYMPHOCYTES NFR BLD AUTO: 43 %
MCH RBC QN AUTO: 30.6 PG (ref 26.5–33)
MCHC RBC AUTO-ENTMCNC: 33.3 G/DL (ref 31.5–36.5)
MCV RBC AUTO: 92 FL (ref 78–100)
MONOCYTES # BLD AUTO: 0.4 10E3/UL (ref 0–1.3)
MONOCYTES NFR BLD AUTO: 5 %
NEUTROPHILS # BLD AUTO: 4.4 10E3/UL (ref 1.6–8.3)
NEUTROPHILS NFR BLD AUTO: 50 %
NRBC # BLD AUTO: 0 10E3/UL
NRBC BLD AUTO-RTO: 0 /100
PLATELET # BLD AUTO: 431 10E3/UL (ref 150–450)
POTASSIUM SERPL-SCNC: 3.1 MMOL/L (ref 3.4–5.3)
RBC # BLD AUTO: 4.18 10E6/UL (ref 3.8–5.2)
SODIUM SERPL-SCNC: 138 MMOL/L (ref 136–145)
TROPONIN T SERPL HS-MCNC: 6 NG/L
TROPONIN T SERPL HS-MCNC: 6 NG/L
WBC # BLD AUTO: 8.8 10E3/UL (ref 4–11)

## 2023-02-11 PROCEDURE — 258N000003 HC RX IP 258 OP 636: Performed by: EMERGENCY MEDICINE

## 2023-02-11 PROCEDURE — 250N000011 HC RX IP 250 OP 636: Performed by: EMERGENCY MEDICINE

## 2023-02-11 PROCEDURE — 36415 COLL VENOUS BLD VENIPUNCTURE: CPT | Performed by: EMERGENCY MEDICINE

## 2023-02-11 PROCEDURE — 96360 HYDRATION IV INFUSION INIT: CPT

## 2023-02-11 PROCEDURE — 85014 HEMATOCRIT: CPT | Performed by: EMERGENCY MEDICINE

## 2023-02-11 PROCEDURE — 84484 ASSAY OF TROPONIN QUANT: CPT | Performed by: EMERGENCY MEDICINE

## 2023-02-11 PROCEDURE — 71275 CT ANGIOGRAPHY CHEST: CPT

## 2023-02-11 PROCEDURE — 93005 ELECTROCARDIOGRAM TRACING: CPT

## 2023-02-11 PROCEDURE — 99285 EMERGENCY DEPT VISIT HI MDM: CPT | Mod: 25

## 2023-02-11 PROCEDURE — 85379 FIBRIN DEGRADATION QUANT: CPT | Performed by: EMERGENCY MEDICINE

## 2023-02-11 PROCEDURE — 80048 BASIC METABOLIC PNL TOTAL CA: CPT | Performed by: EMERGENCY MEDICINE

## 2023-02-11 PROCEDURE — 250N000013 HC RX MED GY IP 250 OP 250 PS 637: Performed by: EMERGENCY MEDICINE

## 2023-02-11 PROCEDURE — 96361 HYDRATE IV INFUSION ADD-ON: CPT

## 2023-02-11 PROCEDURE — 84484 ASSAY OF TROPONIN QUANT: CPT | Mod: 91 | Performed by: EMERGENCY MEDICINE

## 2023-02-11 PROCEDURE — 250N000009 HC RX 250: Performed by: EMERGENCY MEDICINE

## 2023-02-11 RX ORDER — IOPAMIDOL 755 MG/ML
500 INJECTION, SOLUTION INTRAVASCULAR ONCE
Status: COMPLETED | OUTPATIENT
Start: 2023-02-11 | End: 2023-02-11

## 2023-02-11 RX ORDER — POTASSIUM CHLORIDE 1.5 G/1.58G
40 POWDER, FOR SOLUTION ORAL ONCE
Status: COMPLETED | OUTPATIENT
Start: 2023-02-11 | End: 2023-02-11

## 2023-02-11 RX ADMIN — SODIUM CHLORIDE 73 ML: 9 INJECTION, SOLUTION INTRAVENOUS at 22:27

## 2023-02-11 RX ADMIN — POTASSIUM CHLORIDE 40 MEQ: 1.5 POWDER, FOR SOLUTION ORAL at 23:18

## 2023-02-11 RX ADMIN — IOPAMIDOL 51 ML: 755 INJECTION, SOLUTION INTRAVENOUS at 22:27

## 2023-02-11 RX ADMIN — SODIUM CHLORIDE 1000 ML: 9 INJECTION, SOLUTION INTRAVENOUS at 22:07

## 2023-02-11 ASSESSMENT — ACTIVITIES OF DAILY LIVING (ADL)
ADLS_ACUITY_SCORE: 35
ADLS_ACUITY_SCORE: 33

## 2023-02-12 NOTE — ED NOTES
Patient comes to ED for evaluation of midsternal chest pain. Patient reports pain started about 2 hours ago when she was out walking the dog.  States she has had similar pain in the past with PE (last 2021).  Rates pain 6/10 at rest worse with deep inspiration. Does take Eliquis currently. Alert and oriented x 4.   
General

## 2023-02-12 NOTE — ED TRIAGE NOTES
Pt c/o chest pain started 2 hours ago after a walk. Pt had recent surgery 3 weeks ago and was off her blood thinner. Pt started the blood thinner 2 weeks ago. Pt reports when she breaths in it hurts. Pt has hx of PE
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
## 2023-02-13 LAB
ATRIAL RATE - MUSE: 104 BPM
DIASTOLIC BLOOD PRESSURE - MUSE: NORMAL MMHG
INTERPRETATION ECG - MUSE: NORMAL
P AXIS - MUSE: 79 DEGREES
PR INTERVAL - MUSE: 148 MS
QRS DURATION - MUSE: 84 MS
QT - MUSE: 346 MS
QTC - MUSE: 454 MS
R AXIS - MUSE: 37 DEGREES
SYSTOLIC BLOOD PRESSURE - MUSE: NORMAL MMHG
T AXIS - MUSE: 28 DEGREES
VENTRICULAR RATE- MUSE: 104 BPM

## 2023-02-18 ENCOUNTER — HOSPITAL ENCOUNTER (EMERGENCY)
Facility: CLINIC | Age: 48
Discharge: HOME | End: 2023-02-18
Attending: EMERGENCY MEDICINE | Admitting: EMERGENCY MEDICINE
Payer: COMMERCIAL

## 2023-02-18 ENCOUNTER — APPOINTMENT (OUTPATIENT)
Dept: CT IMAGING | Facility: CLINIC | Age: 48
End: 2023-02-18
Attending: EMERGENCY MEDICINE
Payer: COMMERCIAL

## 2023-02-18 VITALS
HEART RATE: 89 BPM | OXYGEN SATURATION: 100 % | DIASTOLIC BLOOD PRESSURE: 92 MMHG | RESPIRATION RATE: 20 BRPM | SYSTOLIC BLOOD PRESSURE: 142 MMHG | TEMPERATURE: 98 F

## 2023-02-18 DIAGNOSIS — K31.84 GASTROPARESIS: ICD-10-CM

## 2023-02-18 DIAGNOSIS — R10.13 EPIGASTRIC PAIN: ICD-10-CM

## 2023-02-18 LAB
ALBUMIN SERPL BCG-MCNC: 4.8 G/DL (ref 3.5–5.2)
ALBUMIN UR-MCNC: NEGATIVE MG/DL
ALP SERPL-CCNC: 78 U/L (ref 35–104)
ALT SERPL W P-5'-P-CCNC: 10 U/L (ref 10–35)
ANION GAP SERPL CALCULATED.3IONS-SCNC: 13 MMOL/L (ref 7–15)
APPEARANCE UR: CLEAR
AST SERPL W P-5'-P-CCNC: 12 U/L (ref 10–35)
BASOPHILS # BLD AUTO: 0.1 10E3/UL (ref 0–0.2)
BASOPHILS NFR BLD AUTO: 1 %
BILIRUB SERPL-MCNC: 0.4 MG/DL
BILIRUB UR QL STRIP: NEGATIVE
BUN SERPL-MCNC: 12.4 MG/DL (ref 6–20)
CALCIUM SERPL-MCNC: 9.7 MG/DL (ref 8.6–10)
CHLORIDE SERPL-SCNC: 103 MMOL/L (ref 98–107)
COLOR UR AUTO: ABNORMAL
CREAT SERPL-MCNC: 0.8 MG/DL (ref 0.51–0.95)
DEPRECATED HCO3 PLAS-SCNC: 21 MMOL/L (ref 22–29)
EOSINOPHIL # BLD AUTO: 0 10E3/UL (ref 0–0.7)
EOSINOPHIL NFR BLD AUTO: 0 %
ERYTHROCYTE [DISTWIDTH] IN BLOOD BY AUTOMATED COUNT: 14.6 % (ref 10–15)
GFR SERPL CREATININE-BSD FRML MDRD: >90 ML/MIN/1.73M2
GLUCOSE SERPL-MCNC: 104 MG/DL (ref 70–99)
GLUCOSE UR STRIP-MCNC: NEGATIVE MG/DL
HCT VFR BLD AUTO: 41.4 % (ref 35–47)
HGB BLD-MCNC: 13.6 G/DL (ref 11.7–15.7)
HGB UR QL STRIP: NEGATIVE
HOLD SPECIMEN: NORMAL
HOLD SPECIMEN: NORMAL
IMM GRANULOCYTES # BLD: 0 10E3/UL
IMM GRANULOCYTES NFR BLD: 0 %
KETONES UR STRIP-MCNC: NEGATIVE MG/DL
LEUKOCYTE ESTERASE UR QL STRIP: ABNORMAL
LIPASE SERPL-CCNC: 34 U/L (ref 13–60)
LYMPHOCYTES # BLD AUTO: 2.3 10E3/UL (ref 0.8–5.3)
LYMPHOCYTES NFR BLD AUTO: 23 %
MCH RBC QN AUTO: 30.1 PG (ref 26.5–33)
MCHC RBC AUTO-ENTMCNC: 32.9 G/DL (ref 31.5–36.5)
MCV RBC AUTO: 92 FL (ref 78–100)
MONOCYTES # BLD AUTO: 0.4 10E3/UL (ref 0–1.3)
MONOCYTES NFR BLD AUTO: 4 %
MUCOUS THREADS #/AREA URNS LPF: PRESENT /LPF
NEUTROPHILS # BLD AUTO: 7.4 10E3/UL (ref 1.6–8.3)
NEUTROPHILS NFR BLD AUTO: 72 %
NITRATE UR QL: NEGATIVE
NRBC # BLD AUTO: 0 10E3/UL
NRBC BLD AUTO-RTO: 0 /100
PH UR STRIP: 6.5 [PH] (ref 5–7)
PLATELET # BLD AUTO: 500 10E3/UL (ref 150–450)
POTASSIUM SERPL-SCNC: 3.4 MMOL/L (ref 3.4–5.3)
PROT SERPL-MCNC: 7.4 G/DL (ref 6.4–8.3)
RBC # BLD AUTO: 4.52 10E6/UL (ref 3.8–5.2)
RBC URINE: 3 /HPF
SODIUM SERPL-SCNC: 137 MMOL/L (ref 136–145)
SP GR UR STRIP: 1.02 (ref 1–1.03)
SQUAMOUS EPITHELIAL: 2 /HPF
TRANSITIONAL EPI: <1 /HPF
UROBILINOGEN UR STRIP-MCNC: NORMAL MG/DL
WBC # BLD AUTO: 10.2 10E3/UL (ref 4–11)
WBC URINE: 9 /HPF

## 2023-02-18 PROCEDURE — 80053 COMPREHEN METABOLIC PANEL: CPT | Performed by: EMERGENCY MEDICINE

## 2023-02-18 PROCEDURE — 250N000009 HC RX 250: Performed by: EMERGENCY MEDICINE

## 2023-02-18 PROCEDURE — 85025 COMPLETE CBC W/AUTO DIFF WBC: CPT | Performed by: EMERGENCY MEDICINE

## 2023-02-18 PROCEDURE — 36415 COLL VENOUS BLD VENIPUNCTURE: CPT | Performed by: EMERGENCY MEDICINE

## 2023-02-18 PROCEDURE — 99285 EMERGENCY DEPT VISIT HI MDM: CPT | Mod: 25

## 2023-02-18 PROCEDURE — 96375 TX/PRO/DX INJ NEW DRUG ADDON: CPT

## 2023-02-18 PROCEDURE — 250N000013 HC RX MED GY IP 250 OP 250 PS 637: Performed by: EMERGENCY MEDICINE

## 2023-02-18 PROCEDURE — 250N000011 HC RX IP 250 OP 636: Performed by: EMERGENCY MEDICINE

## 2023-02-18 PROCEDURE — 96374 THER/PROPH/DIAG INJ IV PUSH: CPT | Mod: 59

## 2023-02-18 PROCEDURE — 81001 URINALYSIS AUTO W/SCOPE: CPT | Performed by: EMERGENCY MEDICINE

## 2023-02-18 PROCEDURE — 74177 CT ABD & PELVIS W/CONTRAST: CPT

## 2023-02-18 PROCEDURE — 258N000003 HC RX IP 258 OP 636: Performed by: EMERGENCY MEDICINE

## 2023-02-18 PROCEDURE — 83690 ASSAY OF LIPASE: CPT | Performed by: EMERGENCY MEDICINE

## 2023-02-18 PROCEDURE — 96361 HYDRATE IV INFUSION ADD-ON: CPT

## 2023-02-18 RX ORDER — METOCLOPRAMIDE 10 MG/1
10 TABLET ORAL 3 TIMES DAILY PRN
Qty: 20 TABLET | Refills: 0 | Status: SHIPPED | OUTPATIENT
Start: 2023-02-18 | End: 2024-03-04

## 2023-02-18 RX ORDER — DICYCLOMINE HCL 20 MG
20 TABLET ORAL ONCE
Status: COMPLETED | OUTPATIENT
Start: 2023-02-18 | End: 2023-02-18

## 2023-02-18 RX ORDER — OLANZAPINE 10 MG/1
1.25 INJECTION, POWDER, LYOPHILIZED, FOR SOLUTION INTRAMUSCULAR ONCE
Status: COMPLETED | OUTPATIENT
Start: 2023-02-18 | End: 2023-02-18

## 2023-02-18 RX ORDER — IOPAMIDOL 755 MG/ML
500 INJECTION, SOLUTION INTRAVASCULAR ONCE
Status: COMPLETED | OUTPATIENT
Start: 2023-02-18 | End: 2023-02-18

## 2023-02-18 RX ORDER — METOCLOPRAMIDE HYDROCHLORIDE 5 MG/ML
5 INJECTION INTRAMUSCULAR; INTRAVENOUS ONCE
Status: COMPLETED | OUTPATIENT
Start: 2023-02-18 | End: 2023-02-18

## 2023-02-18 RX ORDER — ONDANSETRON 2 MG/ML
4 INJECTION INTRAMUSCULAR; INTRAVENOUS ONCE
Status: COMPLETED | OUTPATIENT
Start: 2023-02-18 | End: 2023-02-18

## 2023-02-18 RX ADMIN — SODIUM CHLORIDE 100 ML: 9 INJECTION, SOLUTION INTRAVENOUS at 18:09

## 2023-02-18 RX ADMIN — IOPAMIDOL 54 ML: 755 INJECTION, SOLUTION INTRAVENOUS at 18:08

## 2023-02-18 RX ADMIN — OLANZAPINE 1.25 MG: 10 INJECTION, POWDER, FOR SOLUTION INTRAMUSCULAR at 20:25

## 2023-02-18 RX ADMIN — DICYCLOMINE HYDROCHLORIDE 20 MG: 20 TABLET ORAL at 19:10

## 2023-02-18 RX ADMIN — ALUMINUM HYDROXIDE, MAGNESIUM HYDROXIDE, AND SIMETHICONE 30 ML: 200; 200; 20 SUSPENSION ORAL at 19:11

## 2023-02-18 RX ADMIN — ONDANSETRON 4 MG: 2 INJECTION INTRAMUSCULAR; INTRAVENOUS at 17:43

## 2023-02-18 RX ADMIN — METOCLOPRAMIDE HYDROCHLORIDE 5 MG: 5 INJECTION INTRAMUSCULAR; INTRAVENOUS at 17:44

## 2023-02-18 RX ADMIN — SODIUM CHLORIDE, POTASSIUM CHLORIDE, SODIUM LACTATE AND CALCIUM CHLORIDE 1000 ML: 600; 310; 30; 20 INJECTION, SOLUTION INTRAVENOUS at 17:47

## 2023-02-18 ASSESSMENT — ENCOUNTER SYMPTOMS: ABDOMINAL PAIN: 1

## 2023-02-18 ASSESSMENT — ACTIVITIES OF DAILY LIVING (ADL): ADLS_ACUITY_SCORE: 35

## 2023-02-18 NOTE — ED TRIAGE NOTES
Patient presents to the ED reporting epigastric pressure and pain after eating today. History of gastroparesis.

## 2023-02-18 NOTE — ED PROVIDER NOTES
History     Chief Complaint:  Abdominal Pain       HPI   Leola Uriostegui is a 47 year old female with a history of gastroparesis who presents with epigastric pain which onset today after eating. The pain is worsened while laying down, and it does not radiate anywhere.     Independent Historian: patient     Review of External Notes: reviewed last 3 CT scans: chest (no acute findings), and 2 abd/pel CTs (no acute findings).        ROS:  Review of Systems   Gastrointestinal: Positive for abdominal pain.   All other systems reviewed and are negative.      Allergies:  Cephalexin  Cephalosporins  Clindamycin  Covid-19 Vaccine  Doxycycline  Hydromorphone  Lorazepam  Nitrofurantoin  Oxycodone  Oxycodone-Acetaminophen  Sulfamethoxazole W/Trimethoprim  Tape     Medications:  Trazodone   Topiramate   Omro   Spironolactone   Senna docusate   Seroquel   Progesterone   Pravastatin   Pantoprazole   Ondansetron   Omeprazole   Morphine   Mirtazapine   Metoprolol   Linaclotide    Hydroxyzine    Hydromorphone   Dicyclomine   Cyclobenzaprine   Buspirone   Atomoxetine   Eliquis   Amitriptyline      Past Medical History:     Bipolar 1 disorder   DDD   Endometriosis   Esophageal reflux   Hypertension   Gastrointestinal hemorrhage   Hyperlipidemia   Incisional hernia  Intractable persistent migraine   Anemia   IBS   Left foot drop   Left sided weakness    Ophthalmoplegic migraine   Opioid dependence   Plantar fascial fibromatosis   Spinal stenosis of cervical region   Syncope   Tear of medial cartilage   Peripheral insufficiency      Past Surgical History:    Bowel resection x3  Appendectomy   Arthroplasty knee   Cervical fusion   Bunionectomy    Cholecystgastrostomy    Distal biceps tendon repair   Tonsillectomy and adenoidectomy   Strabismus   Bladder repair   EGD x2   Hernia repair   Hysterectomy    Meniscus repair      Family History:    Mother- melanoma, hyperlipidemia, hypertension, depression   Father- diabetes, heart  disease, hyperlipidemia, hypertension, stroke      Social History:  The patient presents via private vehicle  Former smoker    Physical Exam     Patient Vitals for the past 24 hrs:   BP Temp Pulse Resp SpO2   02/18/23 2045 (!) 142/92 -- 89 -- 100 %   02/18/23 2030 (!) 137/90 -- 84 -- 100 %   02/18/23 2015 (!) 160/93 -- 85 -- 100 %   02/18/23 2000 (!) 140/93 -- 86 -- 100 %   02/18/23 1955 -- -- -- -- 100 %   02/18/23 1940 (!) 144/92 -- -- -- 100 %   02/18/23 1930 (!) 146/98 -- 84 -- 100 %   02/18/23 1920 (!) 151/99 -- -- -- 100 %   02/18/23 1900 (!) 143/89 -- 80 -- 100 %   02/18/23 1610 (!) 127/101 98  F (36.7  C) 108 20 98 %        Physical Exam  HENT:  mmm, no rhinorrhea  Eyes: periorbital tissues and sclera normal   Neck: supple, no abnormal swelling  Lungs:  CTAB,  no resp distress  CV: rrr, no m/r/g, ppi  Abd: soft, no significant localizing tenderness to palpation, nondistended, no rebound/masses/guarding/hsm  Ext: no peripheral edema  Skin: warm, dry, well perfused, no rashes/bruising/lesions on exposed skin  Neuro: alert, MAEE, no gross motor or sensory deficits, gait stable  Psych: Normal mood, normal affect            Emergency Department Course     Imaging:  Abd/pelvis CT,  IV  contrast only TRAUMA / AAA   Final Result   IMPRESSION:    1.  The gastric antrum is not fully distended. This has a similar appearance going back multiple abdominal CTs. This may be due to chronic antritis.      2.  Constipation seen previously has resolved. The colon is not distended and cannot exclude mild diffuse colonic wall thickening and colitis. No ascites.      3.  Previous cholecystectomy and hysterectomy.        Report per radiology    Laboratory:  Labs Ordered and Resulted from Time of ED Arrival to Time of ED Departure   COMPREHENSIVE METABOLIC PANEL - Abnormal       Result Value    Sodium 137      Potassium 3.4      Chloride 103      Carbon Dioxide (CO2) 21 (*)     Anion Gap 13      Urea Nitrogen 12.4      Creatinine  0.80      Calcium 9.7      Glucose 104 (*)     Alkaline Phosphatase 78      AST 12      ALT 10      Protein Total 7.4      Albumin 4.8      Bilirubin Total 0.4      GFR Estimate >90     CBC WITH PLATELETS AND DIFFERENTIAL - Abnormal    WBC Count 10.2      RBC Count 4.52      Hemoglobin 13.6      Hematocrit 41.4      MCV 92      MCH 30.1      MCHC 32.9      RDW 14.6      Platelet Count 500 (*)     % Neutrophils 72      % Lymphocytes 23      % Monocytes 4      % Eosinophils 0      % Basophils 1      % Immature Granulocytes 0      NRBCs per 100 WBC 0      Absolute Neutrophils 7.4      Absolute Lymphocytes 2.3      Absolute Monocytes 0.4      Absolute Eosinophils 0.0      Absolute Basophils 0.1      Absolute Immature Granulocytes 0.0      Absolute NRBCs 0.0     ROUTINE UA WITH MICROSCOPIC - Abnormal    Color Urine Light Yellow      Appearance Urine Clear      Glucose Urine Negative      Bilirubin Urine Negative      Ketones Urine Negative      Specific Gravity Urine 1.020      Blood Urine Negative      pH Urine 6.5      Protein Albumin Urine Negative      Urobilinogen Urine Normal      Nitrite Urine Negative      Leukocyte Esterase Urine Moderate (*)     Mucus Urine Present (*)     RBC Urine 3 (*)     WBC Urine 9 (*)     Squamous Epithelials Urine 2 (*)     Transitional Epithelials Urine <1     LIPASE - Normal    Lipase 34        Emergency Department Course & Assessments:      Assessments:  I obtained history and examined the patient as noted above.     Interventions:  Medications   lactated ringers BOLUS 1,000 mL (0 mLs Intravenous Stopped 2/18/23 2029)   metoclopramide (REGLAN) injection 5 mg (5 mg Intravenous Given 2/18/23 1744)   ondansetron (ZOFRAN) injection 4 mg (4 mg Intravenous Given 2/18/23 1743)   0.9% sodium chloride BOLUS (0 mLs Intravenous Stopped 2/18/23 2029)   iopamidol (ISOVUE-370) solution 500 mL (54 mLs Intravenous Given 2/18/23 1808)   lidocaine (viscous) (XYLOCAINE) 2 % 15 mL, alum & mag  hydroxide-simethicone (MAALOX) 15 mL GI Cocktail (30 mLs Oral Given 23)   dicyclomine (BENTYL) tablet 20 mg (20 mg Oral Given 23)   OLANZapine (zyPREXA) IV injection 1.25 mg (1.25 mg Intravenous Given 23)        Independent Interpretation (X-rays, CTs, rhythm strip):  CT without visucs perforation, obstruction, obvious vascular abnormality      Disposition:  The patient was discharged to home.     Impression & Plan      Medical Decision Makin-year-old female with a history of chronic abdominal pain here with acute on chronic epigastric pain worse after eating.  No guarding or distention on my abdominal examination.  Blood tests show no acute abnormalities.  Urinalysis with mild pyuria but she does not have symptoms concerning for urinary tract infection will not initiate antibiotics at this time.  CT scan showing no acute surgical vascular or infectious process.  Safe and stable for discharge home at this point.  She is in the process of changing her GI provider for a second opinion to the [facility].    Diagnosis:    ICD-10-CM    1. Epigastric pain  R10.13       2. Gastroparesis  K31.84            Discharge Medications:  Discharge Medication List as of 2023  8:30 PM         Scribe Disclosure:  I, Joshua Kjer and Haylee Fairbanks, am serving as a scribe at 8:19 PM on 2023 to document services personally performed by Bishop Chacko MD based on my observations and the provider's statements to me.    2023   Bishop Chacko MD Walker, Jerome Richard, MD  23 1027

## 2023-03-13 DIAGNOSIS — G43.109 MIGRAINE WITH AURA AND WITHOUT STATUS MIGRAINOSUS, NOT INTRACTABLE: ICD-10-CM

## 2023-03-29 ENCOUNTER — OFFICE VISIT (OUTPATIENT)
Dept: PULMONOLOGY | Facility: CLINIC | Age: 48
End: 2023-03-29
Payer: COMMERCIAL

## 2023-03-29 VITALS
OXYGEN SATURATION: 100 % | HEART RATE: 78 BPM | DIASTOLIC BLOOD PRESSURE: 80 MMHG | BODY MASS INDEX: 23.55 KG/M2 | HEIGHT: 62 IN | SYSTOLIC BLOOD PRESSURE: 134 MMHG | WEIGHT: 128 LBS

## 2023-03-29 DIAGNOSIS — R91.1 LUNG NODULE: Primary | ICD-10-CM

## 2023-03-29 PROCEDURE — 99203 OFFICE O/P NEW LOW 30 MIN: CPT | Performed by: INTERNAL MEDICINE

## 2023-03-29 RX ORDER — THYROID 60 MG/1
1 TABLET ORAL DAILY
COMMUNITY

## 2023-03-29 RX ORDER — THYROID 15 MG/1
15 TABLET ORAL
COMMUNITY
Start: 2023-03-27

## 2023-04-01 ENCOUNTER — HEALTH MAINTENANCE LETTER (OUTPATIENT)
Age: 48
End: 2023-04-01

## 2023-06-10 NOTE — PROGRESS NOTES
LUNG NODULE & INTERVENTIONAL PULMONARY CLINIC  [facility], [facility]     Leola Uriostegui MRN# [id number]   Age: 48 year old YOB: 1975       Requesting Physician: No referring provider defined for this encounter.       Assessment and Plan:    1. Stable solitary pulmonary lung nodule(s). Given the characteristics on current/previous imaging and risk factors; I would classify this to be radiologically benign - stable for 4 years. No further workup needed.           History:     Leola Uriostegui is a 48 year old female with sig h/o for tobacco use in the past who is here for evaluation/followup of lung nodule(s). This was found on a PE study      - My interpretation of the images relevant for this visit includes: RLL nodule, stable vs CT from 2019              Past Medical History:      Past Medical History:   Diagnosis Date     Anemia      Bipolar I disorder (H)      Cervical stenosis of spinal canal      Continuous opioid dependence (H)      DDD (degenerative disc disease), lumbar      Deep vein thrombosis (H) 01/2021    bilateral     Gastroesophageal reflux disease      Hypertension      Irritable bowel syndrome      Itching      Migraine      Mixed hyperlipidemia      PONV (postoperative nausea and vomiting)      Pulmonary embolism (H) 01/2021    2 times, 2nd time 8/2021     Tachycardia      Venous (peripheral) insufficiency            Past Surgical History:      Past Surgical History:   Procedure Laterality Date     ABDOMEN SURGERY      bowel resection x3 for meckels diverticulum     APPENDECTOMY       ARTHROPLASTY KNEE Left 11/15/2022    Procedure: Left total knee arthroplasty;  Surgeon: Valentín Beatty MD;  Location: [facility] OR     BACK SURGERY  2009    cervical fusion     BUNIONECTOMY Left 2010     CHOLECYSTECTOMY       COLONOSCOPY       DISTAL BICEPS TENDON REPAIR Left      ENT SURGERY  1988    T&A     EYE SURGERY Bilateral 2008    strabismus      GENITOURINARY SURGERY      bladder repair     GI SURGERY      EGD X2     HERNIA REPAIR  2019    ventral in 2008, incisional in 2019     HYSTERECTOMY  2003     LAPAROSCOPY      abdominal laparoscopy X15     ORTHOPEDIC SURGERY Left 2010    repair of meniscus     CO ANESTH,DX ARTHROSCOPIC PROC KNEE JOINT Right 2020          Social History:     Social History     Tobacco Use     Smoking status: Former     Types: Cigarettes     Quit date: 1998     Years since quittin.5     Smokeless tobacco: Never   Vaping Use     Vaping status: Never Used   Substance Use Topics     Alcohol use: Not Currently          Family History:   No family history on file.        Allergies:      Allergies   Allergen Reactions     Cephalexin      PN: LW Reaction: Rash     Cephalosporins Diarrhea     Clindamycin Swelling     Covid-19 (Mrna) Vaccine Hives     Doxycycline      PN: LW Reaction: nausea     Hydromorphone      PN: LW Reaction: Rash     Lorazepam Other (See Comments)     Nitrofurantoin Diarrhea     Oxycodone Hives and Itching     Oxycodone-Acetaminophen      PN: LW Reaction: Vision Changes     Sulfamethoxazole-Trimethoprim Rash     Tape [Adhesive Tape] Rash          Medications:     Current Outpatient Medications   Medication Sig     acetaminophen (TYLENOL) 325 MG tablet Take 2 tablets (650 mg) by mouth every 4 hours as needed for other (mild pain)     amitriptyline (ELAVIL) 25 MG tablet Take 1 tablet (25 mg) by mouth At Bedtime (Patient taking differently: Take 25 mg by mouth At Bedtime Migraines)     apixaban ANTICOAGULANT (ELIQUIS) 5 MG tablet Take 1 tablet (5 mg) by mouth 2 times daily Restart after you have completed your enoxaparin injections in ~10 days as per your orthopedic surgeon.     atomoxetine (STRATTERA) 80 MG capsule Take 80 mg by mouth daily     busPIRone (BUSPAR) 15 MG tablet Take 15 mg by mouth 3 times daily      cyclobenzaprine (FLEXERIL) 10 MG tablet Take 10 mg by mouth 3 times daily as needed      hydrOXYzine (ATARAX) 25 MG tablet Take 1 tablet (25 mg) by mouth every 6 hours as needed for itching or anxiety (with pain, moderate pain)     lidocaine (LIDODERM) 5 % Patch Apply 1 patch topically daily as needed     linaclotide (LINZESS) 145 MCG capsule Take 145 mcg by mouth daily     metoclopramide (REGLAN) 10 MG tablet Take 1 tablet (10 mg) by mouth 3 times daily as needed (nausea, vomiting)     metoprolol (LOPRESSOR) 25 MG tablet Take 25 mg by mouth 2 times daily     mirtazapine (REMERON) 15 MG tablet Take 7.5 mg by mouth At Bedtime     Multiple Vitamins-Minerals (MULTIVITAMIN ADULT PO) Take 1 tablet by mouth daily     omeprazole (PRILOSEC) 20 MG DR capsule Take 20 mg by mouth daily     ondansetron (ZOFRAN ODT) 4 MG ODT tab Take 1-2 tablets (4-8 mg) by mouth every 8 hours as needed for nausea Dissolve ON the tongue.     pantoprazole (PROTONIX) 40 MG EC tablet Take 40 mg by mouth daily as needed     pravastatin (PRAVACHOL) 40 MG tablet Take 40 mg by mouth At Bedtime     progesterone (PROMETRIUM) 200 MG capsule Take 200 mg by mouth At Bedtime     QUEtiapine (SEROQUEL) 200 MG tablet Take 200 mg by mouth nightly as needed     QUEtiapine (SEROQUEL) 25 MG tablet Take 25 mg by mouth as needed     senna-docusate (SENOKOT-S/PERICOLACE) 8.6-50 MG tablet Take 1-2 tablets by mouth 2 times daily Take while on oral narcotics to prevent or treat constipation.     spironolactone (ALDACTONE) 100 MG tablet Take 100 mg by mouth daily     thyroid (ARMOUR) 15 MG tablet Take 15 mg by mouth     thyroid (ARMOUR) 60 MG tablet Take 60 mg by mouth daily     topiramate (TOPAMAX) 100 MG tablet Take 1 tablet (100 mg) by mouth At Bedtime (Patient taking differently: Take 100 mg by mouth At Bedtime For migraines)     traZODone (DESYREL) 50 MG tablet Take 100 mg by mouth daily     dicyclomine (BENTYL) 10 MG capsule Take 10 mg by mouth 2 times daily as needed     HYDROmorphone (DILAUDID) 2 MG tablet Take 1-2 tablets (2-4 mg) by mouth every 3  "hours as needed for moderate to severe pain     hydrOXYzine (VISTARIL) 25 MG capsule Take 25 mg by mouth as needed     morphine (MS CONTIN) 15 MG CR tablet Take 15 mg by mouth every 12 hours     NONFORMULARY 2.5 mg 2 times daily Medical Cannibus     thyroid (ARMOUR) 60 MG tablet Take 1 tablet by mouth daily     triamcinolone (KENALOG) 0.1 % external cream      No current facility-administered medications for this visit.          Review of Systems:     See HPI         Physical Exam:   /80 (BP Location: Right arm, Patient Position: Chair, Cuff Size: Adult Regular)   Pulse 78   Ht 1.575 m (5' 2\")   Wt 58.1 kg (128 lb)   SpO2 100%   BMI 23.41 kg/m      Constitutional - looks well, in no apparent distress  Eyes - no redness or discharge  Respiratory -breathing appears comfortable. No wheeze or rhonchi.   Cardiac -- Normal rate, rhythm.   Skin - No appreciable discoloration or lesions (very limited exam)  Neurological - No apparent tremors. Speech fluent and articlate  Psychiatric - no signs of delirium or anxiety          Current Laboratory Data:   All laboratory and imaging data reviewed.    No results found for this or any previous visit (from the past 24 hour(s)).               "

## 2023-06-18 ENCOUNTER — HEALTH MAINTENANCE LETTER (OUTPATIENT)
Age: 48
End: 2023-06-18

## 2024-01-11 ENCOUNTER — HOSPITAL ENCOUNTER (EMERGENCY)
Facility: CLINIC | Age: 49
Discharge: HOME | End: 2024-01-12
Attending: STUDENT IN AN ORGANIZED HEALTH CARE EDUCATION/TRAINING PROGRAM | Admitting: STUDENT IN AN ORGANIZED HEALTH CARE EDUCATION/TRAINING PROGRAM
Payer: MEDICAID

## 2024-01-11 DIAGNOSIS — R60.0 PERIPHERAL EDEMA: ICD-10-CM

## 2024-01-11 LAB
ALBUMIN SERPL BCG-MCNC: 4.8 G/DL (ref 3.5–5.2)
ALP SERPL-CCNC: 67 U/L (ref 40–150)
ALT SERPL W P-5'-P-CCNC: 19 U/L (ref 0–50)
ANION GAP SERPL CALCULATED.3IONS-SCNC: 9 MMOL/L (ref 7–15)
AST SERPL W P-5'-P-CCNC: 25 U/L (ref 0–45)
BASOPHILS # BLD AUTO: 0 10E3/UL (ref 0–0.2)
BASOPHILS NFR BLD AUTO: 1 %
BILIRUB DIRECT SERPL-MCNC: <0.2 MG/DL (ref 0–0.3)
BILIRUB SERPL-MCNC: 0.2 MG/DL
BUN SERPL-MCNC: 16.7 MG/DL (ref 6–20)
CALCIUM SERPL-MCNC: 9.2 MG/DL (ref 8.6–10)
CHLORIDE SERPL-SCNC: 105 MMOL/L (ref 98–107)
CREAT SERPL-MCNC: 0.73 MG/DL (ref 0.51–0.95)
D DIMER PPP FEU-MCNC: 0.91 UG/ML FEU (ref 0–0.5)
DEPRECATED HCO3 PLAS-SCNC: 25 MMOL/L (ref 22–29)
EGFRCR SERPLBLD CKD-EPI 2021: >90 ML/MIN/1.73M2
EOSINOPHIL # BLD AUTO: 0 10E3/UL (ref 0–0.7)
EOSINOPHIL NFR BLD AUTO: 1 %
ERYTHROCYTE [DISTWIDTH] IN BLOOD BY AUTOMATED COUNT: 14.3 % (ref 10–15)
GLUCOSE SERPL-MCNC: 108 MG/DL (ref 70–99)
HCT VFR BLD AUTO: 37.2 % (ref 35–47)
HGB BLD-MCNC: 12.1 G/DL (ref 11.7–15.7)
HOLD SPECIMEN: NORMAL
HOLD SPECIMEN: NORMAL
IMM GRANULOCYTES # BLD: 0 10E3/UL
IMM GRANULOCYTES NFR BLD: 0 %
LYMPHOCYTES # BLD AUTO: 0.7 10E3/UL (ref 0.8–5.3)
LYMPHOCYTES NFR BLD AUTO: 9 %
MCH RBC QN AUTO: 30.6 PG (ref 26.5–33)
MCHC RBC AUTO-ENTMCNC: 32.5 G/DL (ref 31.5–36.5)
MCV RBC AUTO: 94 FL (ref 78–100)
MONOCYTES # BLD AUTO: 0.1 10E3/UL (ref 0–1.3)
MONOCYTES NFR BLD AUTO: 1 %
NEUTROPHILS # BLD AUTO: 6.9 10E3/UL (ref 1.6–8.3)
NEUTROPHILS NFR BLD AUTO: 88 %
NRBC # BLD AUTO: 0 10E3/UL
NRBC BLD AUTO-RTO: 0 /100
NT-PROBNP SERPL-MCNC: 76 PG/ML (ref 0–450)
PLATELET # BLD AUTO: 359 10E3/UL (ref 150–450)
POTASSIUM SERPL-SCNC: 4.3 MMOL/L (ref 3.4–5.3)
PROT SERPL-MCNC: 7.4 G/DL (ref 6.4–8.3)
RBC # BLD AUTO: 3.96 10E6/UL (ref 3.8–5.2)
SODIUM SERPL-SCNC: 139 MMOL/L (ref 135–145)
WBC # BLD AUTO: 7.8 10E3/UL (ref 4–11)

## 2024-01-11 PROCEDURE — 80048 BASIC METABOLIC PNL TOTAL CA: CPT | Performed by: EMERGENCY MEDICINE

## 2024-01-11 PROCEDURE — 83880 ASSAY OF NATRIURETIC PEPTIDE: CPT | Performed by: STUDENT IN AN ORGANIZED HEALTH CARE EDUCATION/TRAINING PROGRAM

## 2024-01-11 PROCEDURE — 85025 COMPLETE CBC W/AUTO DIFF WBC: CPT | Performed by: STUDENT IN AN ORGANIZED HEALTH CARE EDUCATION/TRAINING PROGRAM

## 2024-01-11 PROCEDURE — 82248 BILIRUBIN DIRECT: CPT | Performed by: STUDENT IN AN ORGANIZED HEALTH CARE EDUCATION/TRAINING PROGRAM

## 2024-01-11 PROCEDURE — 85025 COMPLETE CBC W/AUTO DIFF WBC: CPT | Performed by: EMERGENCY MEDICINE

## 2024-01-11 PROCEDURE — 93005 ELECTROCARDIOGRAM TRACING: CPT

## 2024-01-11 PROCEDURE — 80048 BASIC METABOLIC PNL TOTAL CA: CPT | Performed by: STUDENT IN AN ORGANIZED HEALTH CARE EDUCATION/TRAINING PROGRAM

## 2024-01-11 PROCEDURE — 36415 COLL VENOUS BLD VENIPUNCTURE: CPT | Performed by: EMERGENCY MEDICINE

## 2024-01-11 PROCEDURE — 85379 FIBRIN DEGRADATION QUANT: CPT | Performed by: STUDENT IN AN ORGANIZED HEALTH CARE EDUCATION/TRAINING PROGRAM

## 2024-01-11 PROCEDURE — 99285 EMERGENCY DEPT VISIT HI MDM: CPT | Mod: 25

## 2024-01-11 ASSESSMENT — ACTIVITIES OF DAILY LIVING (ADL): ADLS_ACUITY_SCORE: 33

## 2024-01-12 ENCOUNTER — APPOINTMENT (OUTPATIENT)
Dept: ULTRASOUND IMAGING | Facility: CLINIC | Age: 49
End: 2024-01-12
Attending: STUDENT IN AN ORGANIZED HEALTH CARE EDUCATION/TRAINING PROGRAM
Payer: MEDICAID

## 2024-01-12 VITALS
SYSTOLIC BLOOD PRESSURE: 142 MMHG | RESPIRATION RATE: 18 BRPM | OXYGEN SATURATION: 99 % | HEART RATE: 81 BPM | TEMPERATURE: 98.1 F | DIASTOLIC BLOOD PRESSURE: 94 MMHG

## 2024-01-12 LAB
ATRIAL RATE - MUSE: 81 BPM
DIASTOLIC BLOOD PRESSURE - MUSE: NORMAL MMHG
INTERPRETATION ECG - MUSE: NORMAL
P AXIS - MUSE: 64 DEGREES
PR INTERVAL - MUSE: 148 MS
QRS DURATION - MUSE: 86 MS
QT - MUSE: 368 MS
QTC - MUSE: 427 MS
R AXIS - MUSE: 61 DEGREES
SYSTOLIC BLOOD PRESSURE - MUSE: NORMAL MMHG
T AXIS - MUSE: 59 DEGREES
VENTRICULAR RATE- MUSE: 81 BPM

## 2024-01-12 PROCEDURE — 93970 EXTREMITY STUDY: CPT

## 2024-01-12 ASSESSMENT — ACTIVITIES OF DAILY LIVING (ADL): ADLS_ACUITY_SCORE: 35

## 2024-01-12 NOTE — ED TRIAGE NOTES
Patient reports bilateral leg swelling. Patient states she has not been urinating as often either. Patient reports a 20lb weight gain.       Triage Assessment (Adult)       Row Name 01/11/24 1927          Triage Assessment    Airway WDL WDL        Respiratory WDL    Respiratory WDL WDL        Skin Circulation/Temperature WDL    Skin Circulation/Temperature WDL WDL        Cardiac WDL    Cardiac WDL WDL        Peripheral/Neurovascular WDL    Peripheral Neurovascular WDL WDL        Cognitive/Neuro/Behavioral WDL    Cognitive/Neuro/Behavioral WDL WDL

## 2024-01-12 NOTE — DISCHARGE INSTRUCTIONS
Return to the emergency department if symptoms are worsening, become concerning, or for any other concerns. Follow-up with your doctor in 2-3 and sooner if needed.    Discuss with your surgeon regarding your concerns of emptying your bladder.

## 2024-01-12 NOTE — ED PROVIDER NOTES
History     Chief Complaint:  Leg Swelling       HPI   Leola Uriostegui is a 48 year old female history of tachycardia, hypertension, bipolar disease, hyperlipidemia, presenting with concerns of bilateral leg swelling.  She states that 2 weeks ago overnight she gained 12 pounds.  She states that her legs were suddenly swollen on both sides.  She is not having any leg pain.  No redness.  She does have history of prior DVT that occurred after a COVID - vaccine she was on anticoagulation but she did not like the side effects so she stopped taking it over a year ago.  No difficulty breathing.  No abdominal pain.  She states she has been urinating less for multiple months now but can physically urinate.  No fever.  No injury.  No back pain.  No numbness.  Recently she is also noted swelling in her hands in the morning.  No new medications.      Independent Historian:    none    Review of External Notes:  Cardiology note November 1, 2023.    Allergies:  Cephalexin  Cephalosporins  Clindamycin  Covid-19 (Mrna) Vaccine  Doxycycline  Hydromorphone  Lorazepam  Nitrofurantoin  Oxycodone  Oxycodone-Acetaminophen  Sulfamethoxazole-Trimethoprim  Tape [Adhesive Tape]     Physical Exam   Patient Vitals for the past 24 hrs:   BP Temp Pulse Resp SpO2   01/11/24 1928 (!) 145/105 98.1  F (36.7  C) 83 18 97 %        Physical Exam  GENERAL: Patient well-appearing  HEAD: Atraumatic.  NECK: No rigidity  CV: RRR, no murmurs rubs or gallops  PULM: CTAB with good aeration; no retractions, rales, rhonchi, or wheezing  ABD: Soft, nontender, nondistended, no guarding  DERM: No rash. Skin warm and dry  EXTREMITY: Moving all extremities without difficulty. No calf tenderness.  1+ edema bilateral lower extremities.  VASCULAR: Symmetric pulses bilaterally      Emergency Department Course   ECG  ECG interpreted by me.  Time 2008  NSR at 81. No ST elevation or depression. Normal intervals. Normal axis.   No evidence of WPW, Brugada, HOCM, ARVD, ASD,  or Wellen's.           Laboratory: Imaging:   Labs Ordered and Resulted from Time of ED Arrival to Time of ED Departure   BASIC METABOLIC PANEL - Abnormal       Result Value    Sodium 139      Potassium 4.3      Chloride 105      Carbon Dioxide (CO2) 25      Anion Gap 9      Urea Nitrogen 16.7      Creatinine 0.73      GFR Estimate >90      Calcium 9.2      Glucose 108 (*)    CBC WITH PLATELETS AND DIFFERENTIAL - Abnormal    WBC Count 7.8      RBC Count 3.96      Hemoglobin 12.1      Hematocrit 37.2      MCV 94      MCH 30.6      MCHC 32.5      RDW 14.3      Platelet Count 359      % Neutrophils 88      % Lymphocytes 9      % Monocytes 1      % Eosinophils 1      % Basophils 1      % Immature Granulocytes 0      NRBCs per 100 WBC 0      Absolute Neutrophils 6.9      Absolute Lymphocytes 0.7 (*)     Absolute Monocytes 0.1      Absolute Eosinophils 0.0      Absolute Basophils 0.0      Absolute Immature Granulocytes 0.0      Absolute NRBCs 0.0     D DIMER QUANTITATIVE - Abnormal    D-Dimer Quantitative 0.91 (*)    HEPATIC FUNCTION PANEL - Normal    Protein Total 7.4      Albumin 4.8      Bilirubin Total 0.2      Alkaline Phosphatase 67      AST 25      ALT 19      Bilirubin Direct <0.20     NT PROBNP INPATIENT - Normal    N terminal Pro BNP Inpatient 76       US Lower Extremity Venous Duplex Bilateral   Final Result   IMPRESSION:   1.  No deep venous thrombosis in the bilateral lower extremities.                 Emergency Department Course & Assessments:             Interventions:  Medications - No data to display     Assessments, Independent Interpretation, Consult/Discussion of ManagementTests:       Social Determinants of Health affecting care:  None    Disposition:  The patient was discharged to home.     Impression & Plan         Medical Decision Making:  Patient presenting with concerns of a couple weeks of lower extremity edema that is now also intermittently in both hands.  Differential diagnosis-considered  hypoalbuminemia, DVT, CHF, and others.  BNP within normal limits.  No difficulty breathing.  Not suggestive of CHF.  Vital signs are unremarkable.  She is well-appearing.  No electrolyte abnormalities.  Albumin within normal limits.  D-dimer slightly elevated so ordered duplex lower extremity ultrasounds which were negative for DVT.  No obvious cause for her edema.  I recommend she follow-up with her doctor for this.  Do not think she requires admission.  In regards to her difficulty emptying her bladder.  No back pain.  No numbness.  No loss control bladder.  Do not suspect cord injury.  Patient has had prior bladder surgery in the past.  This could be contributing to this.  This is been going on for few months.  I recommend she follow-up with her doctor regarding this.  She does not have any pain or burning when she pees.  Do not think this is UTI.  Patient does not want to wait for urinalysis.  I have evaluated the patient for acute medical emergencies and have clinically decided no further acute medical interventions are required. Reassessed multiple times and well appearing. Patient stable for discharge. All questions answered. Given strict return precautions. Patient content with plan. The differential diagnosis and treatment modalities were discussed thoroughly with the patient. Recommended PCP follow-up in 2-3 days.      Diagnosis:    ICD-10-CM    1. Peripheral edema  R60.9            Discharge Medications:  New Prescriptions    No medications on file          1/11/2024   Sourav Grant MD Foss, Kevin, MD  01/12/24 0153

## 2024-01-24 ENCOUNTER — VIRTUAL VISIT (OUTPATIENT)
Dept: NEUROLOGY | Facility: CLINIC | Age: 49
End: 2024-01-24
Payer: MEDICAID

## 2024-01-24 ENCOUNTER — TELEPHONE (OUTPATIENT)
Dept: NEUROLOGY | Facility: CLINIC | Age: 49
End: 2024-01-24

## 2024-01-24 DIAGNOSIS — G43.109 MIGRAINE WITH AURA AND WITHOUT STATUS MIGRAINOSUS, NOT INTRACTABLE: Primary | ICD-10-CM

## 2024-01-24 PROCEDURE — 99214 OFFICE O/P EST MOD 30 MIN: CPT | Mod: 95 | Performed by: PSYCHIATRY & NEUROLOGY

## 2024-01-24 RX ORDER — TOPIRAMATE 100 MG/1
100 TABLET, FILM COATED ORAL AT BEDTIME
Qty: 30 TABLET | Refills: 11 | Status: SHIPPED | OUTPATIENT
Start: 2024-01-24 | End: 2024-01-29

## 2024-01-24 RX ORDER — ONDANSETRON 4 MG/1
4 TABLET, ORALLY DISINTEGRATING ORAL EVERY 8 HOURS PRN
Qty: 10 TABLET | Refills: 11 | Status: SHIPPED | OUTPATIENT
Start: 2024-01-24 | End: 2024-03-04

## 2024-01-24 ASSESSMENT — HEADACHE IMPACT TEST (HIT 6)
HOW OFTEN DID HEADACHS LIMIT CONCENTRATION ON WORK OR DAILY ACTIVITY: SOMETIMES
HOW OFTEN HAVE YOU FELT FED UP OR IRRITATED BECAUSE OF YOUR HEADACHES: SOMETIMES
WHEN YOU HAVE A HEADACHE HOW OFTEN DO YOU WISH YOU COULD LIE DOWN: ALWAYS
WHEN YOU HAVE A HEADACHE HOW OFTEN DO YOU WISH YOU COULD LIE DOWN: ALWAYS
HIT6 TOTAL SCORE: 62
HOW OFTEN HAVE YOU FELT TOO TIRED TO WORK BECAUSE OF YOUR HEADACHES: ALWAYS
HIT6 TOTAL SCORE: 62
WHEN YOU HAVE HEADACHES HOW OFTEN IS THE PAIN SEVERE: NEVER
HOW OFTEN DO HEADACHES LIMIT YOUR DAILY ACTIVITIES: SOMETIMES
WHEN YOU HAVE HEADACHES HOW OFTEN IS THE PAIN SEVERE: NEVER
HOW OFTEN DID HEADACHS LIMIT CONCENTRATION ON WORK OR DAILY ACTIVITY: SOMETIMES
HOW OFTEN HAVE YOU FELT TOO TIRED TO WORK BECAUSE OF YOUR HEADACHES: ALWAYS
HOW OFTEN DO HEADACHES LIMIT YOUR DAILY ACTIVITIES: SOMETIMES
HOW OFTEN HAVE YOU FELT FED UP OR IRRITATED BECAUSE OF YOUR HEADACHES: SOMETIMES

## 2024-01-24 ASSESSMENT — MIGRAINE DISABILITY ASSESSMENT (MIDAS)
ON A SCALE FROM 0-10 ON AVERAGE HOW PAINFUL WERE HEADACHES: 0
HOW MANY DAYS WAS YOUR PRODUCTIVITY CUT IN HALF BECAUSE OF HEADACHES: 3
HOW MANY DAYS IN THE PAST 3 MONTHS HAVE YOU HAD A HEADACHE: 3
HOW MANY DAYS DID YOU MISS WORK OR SCHOOL BECAUSE OF HEADACHES: 3
HOW OFTEN WERE SOCIAL ACTIVITIES MISSED DUE TO HEADACHES: 3
HOW MANY DAYS DID YOU NOT DO HOUSEWORK BECAUSE OF HEADACHES: 3
TOTAL SCORE: 15
HOW MANY DAYS WAS HOUSEWORK PRODUCTIVITY CUT IN HALF DUE TO HEADACHES: 3

## 2024-01-24 NOTE — NURSING NOTE
Is the patient currently in the state of [place]? YES    Visit mode:VIDEO    If the visit is dropped, the patient can be reconnected by: VIDEO VISIT: Text to cell phone:   Telephone Information:   Mobile [phone]       Will anyone else be joining the visit? NO  (If patient encounters technical issues they should call [phone]    How would you like to obtain your AVS? MyChart    Are changes needed to the allergy or medication list?  Patient on her way to the [facility] and had to get pulled over, VF unaware until patient said something that she was driving. Could not verify medications, stated she would pull over to rest stop and update on mychart.     Reason for visit: RECHECK    Nayeli KIMF

## 2024-01-24 NOTE — PROGRESS NOTES
[facility]    Headache Neurology Progress Note  January 24, 2024    Subjective:    Leola Uriostegui returns for follow up of intermittent neurological symptoms    She has not had episodes since her last visit in 2021 until this past week. She says she had 3 episodes within the last week. Her first episode happened after an 11 hour workday. She was driving and her symptoms started with visual changes in both her eyes described as blurry vision and felt that her eyes were dilated when she looked at them. She took her topamax and the next day her symptoms were gone. The next day she had another episode. She did have nausea with an episode without vomiting. No vertigo. While driving it was difficult to navigate. She has blurry vision in both eyes currently just has it in the right one. No speech difficulty this time. No difficulty in concentration.  The episode after work resolved when she woke up the next day. The second episode lasted about 8 hours. Today her episode has been ongoing for about 3 hours. She does not have a headache. She feels that her episodes are similar to the previous episodes except the nausea. She takes topiramate daily 100 mg. She denies having side effects. She used to have much more episodes before the topiramate but since starting the medication helped a lot. 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 She feels that the job may have long hours and may have a role in her current symptoms. She is also going through a divorce. She has cut back on caffeine is currently having mountain dew and one coffee and is drinking a good amount of water too. She also takes multi vitamins micro core.    She has a PMHx significant for bipolar disorder type 1, HTN, HLD, cervical stenosis s/p C4-C6 anterior fusion, chronic pain, IBS with diarrhea, venous insufficiency, DVT.    Objective:    Vitals: There were no vitals taken  for this visit.  General: Cooperative, NAD  Neurologic:  Mental Status: Fully alert, attentive and oriented. Speech clear and fluent.   Cranial Nerves: Facial movements symmetric.   Motor: No abnormal movements.      Pertinent Investigations:    MRI 09/11/2019  1. No evidence of acute intracranial abnormality.   2. Stable small discrete foci of T2 prolongation are present in the subcortical white matter, nonspecific but most commonly noted in the setting of migraines, hypertension, diabetes, or collagen vascular disease.       Assessment/Plan:   Leola Uriostegui is a 48 year old-year-old following up for intermittent neurologic symptoms with previously noted episodes of b/l blurry vision, gait instability, non sensical speech with a work up in the past including EEG and MRI brain without identifiable structural cause and there was no access to her EEG report although was reportedly normal. Her episodes resolved and would rarely occur after starting topiramate. She did not have another episode since 2021 until now. She now has return of her symptoms this time with 3 episodes of b/l blurry vision and nausea. No weakness, speech difficulty, loss of consciousness. Considering she has return of her intermittent neurologic episodes and has not had imaging or EEG for many years it would be reasonable to rule out alternative etiology with MRI brain and rule out epilepsy with an EEG in case she has a pathology that may have evolved overtime or was not captured previously. Alternatively, her symptoms are not typical for a headache subtype as she has b/l blurry vision and not a typical aura and does not have headache afterwards. Interestingly, she has had significant improvement in her symptoms with topiramate and will continue at the same dose. Suspect recent social stressors including work and personal may have a role as well with episode recurrence. In case her work up is unrevealing and her symptom frequency does not  improve then would discuss alternative preventative options.    - MRI Brain with and without contrast  - Routine EEG  - Topiramate 100 mg once daily  - Zofran PRN for nausea    Discussed and seen virtually with Dr. Brodie Pacheco MD  Neurology PGY-3    Physician Attestation   I, Kacy Calloway MD, saw this patient and agree with the findings and plan of care as documented in the note.      Kacy Calloway MD

## 2024-01-24 NOTE — LETTER
1/24/2024       RE: Leola Uriostegui  [address]  [place] [address]       Dear Colleague,    Thank you for referring your patient, Leola Uriostegui, to the [facility]OLOG[facility] [place] [facility]. Please see a copy of my visit note below.    [facility]    Headache Neurology Progress Note  January 24, 2024    Subjective:    Leola Uriostegui returns for follow up of intermittent neurological symptoms    She has not had episodes since her last visit in 2021 until this past week. She says she had 3 episodes within the last week. Her first episode happened after an 11 hour workday. She was driving and her symptoms started with visual changes in both her eyes described as blurry vision and felt that her eyes were dilated when she looked at them. She took her topamax and the next day her symptoms were gone. The next day she had another episode. She did have nausea with an episode without vomiting. No vertigo. While driving it was difficult to navigate. She has blurry vision in both eyes currently just has it in the right one. No speech difficulty this time. No difficulty in concentration.  The episode after work resolved when she woke up the next day. The second episode lasted about 8 hours. Today her episode has been ongoing for about 3 hours. She does not have a headache. She feels that her episodes are similar to the previous episodes except the nausea. She takes topiramate daily 100 mg. She denies having side effects. She used to have much more episodes before the topiramate but since starting the medication helped a lot. 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 She feels that the job may have long hours and may have a role in her current symptoms. She is also going through a divorce. She has cut back on caffeine is currently having mountain dew and one  coffee and is drinking a good amount of water too. She also takes multi vitamins micro core.    She has a PMHx significant for bipolar disorder type 1, HTN, HLD, cervical stenosis s/p C4-C6 anterior fusion, chronic pain, IBS with diarrhea, venous insufficiency, DVT.    Objective:    Vitals: There were no vitals taken for this visit.  General: Cooperative, NAD  Neurologic:  Mental Status: Fully alert, attentive and oriented. Speech clear and fluent.   Cranial Nerves: Facial movements symmetric.   Motor: No abnormal movements.      Pertinent Investigations:    MRI 09/11/2019  1. No evidence of acute intracranial abnormality.   2. Stable small discrete foci of T2 prolongation are present in the subcortical white matter, nonspecific but most commonly noted in the setting of migraines, hypertension, diabetes, or collagen vascular disease.     Assessment/Plan:   Leola Uriostegui is a 48 year old-year-old following up for intermittent neurologic symptoms with previously noted episodes of b/l blurry vision, gait instability, non sensical speech with a work up in the past including EEG and MRI brain without identifiable structural cause and there was no access to her EEG report although was reportedly normal. Her episodes resolved and would rarely occur after starting topiramate. She did not have another episode since 2021 until now. She now has return of her symptoms this time with 3 episodes of b/l blurry vision and nausea. No weakness, speech difficulty, loss of consciousness. Considering she has return of her intermittent neurologic episodes and has not had imaging or EEG for many years it would be reasonable to rule out alternative etiology with MRI brain and rule out epilepsy with an EEG in case she has a pathology that may have evolved overtime or was not captured previously. Alternatively, her symptoms are not typical for a headache subtype as she has b/l blurry vision and not a typical aura and does not have  headache afterwards. Interestingly, she has had significant improvement in her symptoms with topiramate and will continue at the same dose. Suspect recent social stressors including work and personal may have a role as well with episode recurrence. In case her work up is unrevealing and her symptom frequency does not improve then would discuss alternative preventative options.    - MRI Brain with and without contrast  - Routine EEG  - Topiramate 100 mg once daily  - Zofran PRN for nausea    Discussed and seen virtually with Dr. Brodie Pacheco MD  Neurology PGY-3    Physician Attestation  I, Kacy Calloway MD, saw this patient and agree with the findings and plan of care as documented in the note.      Again, thank you for allowing me to participate in the care of your patient.      Sincerely,    Kacy Calloway MD

## 2024-01-24 NOTE — TELEPHONE ENCOUNTER
Patient call:     Appointment type: return headache   Provider: david  Return date: 3 m follow-up 4/24/24   Speciality phone number: [phone]  Additional appointment(s) needed: N/A  Additional notes: LVM, MyC x1   Per [facility] notes: 3 m follow-up around 4/24/24     Lizette Lino on 1/24/2024 at 2:20 PM

## 2024-01-24 NOTE — PROGRESS NOTES
Virtual Visit Details    Type of service:  Video Visit     Originating Location (pt. Location): Home    Distant Location (provider location):  Off-site  Platform used for Video Visit: Travis

## 2024-01-26 NOTE — TELEPHONE ENCOUNTER
Left Voicemail (2nd Attempt) for the patient to call back and schedule the following:    Appointment type: Return Headache  Provider: Brodie  Return date: Around 4/24/2024  Specialty phone number: [phone]  Additional appointment(s) needed: N/A  Additional Notes: N/A    Cisco Inman on 1/26/2024 at 12:38 PM

## 2024-01-29 ENCOUNTER — TELEPHONE (OUTPATIENT)
Dept: NEUROLOGY | Facility: CLINIC | Age: 49
End: 2024-01-29
Payer: COMMERCIAL

## 2024-01-29 DIAGNOSIS — G43.109 MIGRAINE WITH AURA AND WITHOUT STATUS MIGRAINOSUS, NOT INTRACTABLE: ICD-10-CM

## 2024-01-29 RX ORDER — TOPIRAMATE 100 MG/1
100 TABLET, FILM COATED ORAL 2 TIMES DAILY
Qty: 60 TABLET | Refills: 11 | Status: SHIPPED | OUTPATIENT
Start: 2024-01-29 | End: 2024-06-10

## 2024-01-29 NOTE — TELEPHONE ENCOUNTER
My[facility] message sent per pt request with recommendations. Clinic coordinators notified to help assist with MRI/EEG scheduling.

## 2024-01-29 NOTE — TELEPHONE ENCOUNTER
Pt experiencing similar symptoms as described in her vist on 1/24 with Dr. Calloway. Pt was experiencing migraine with sensitivity to light and felt that her eyes were dilated prior to driving. Took 100mg Topirimate at 9PM and got home around 1:00AM and took an additional 100mg Topirimate with relief. Symptoms resolved, but wondering how much she can take or if there is a different rescue medication that can be offered. No new signs/symptoms or red flags.    MRI scheduling number provided to pt. Will relay info to Dr. Calloway for further review and recommendations.

## 2024-01-29 NOTE — TELEPHONE ENCOUNTER
[facility] Call Center    Phone Message    May a detailed message be left on voicemail: yes     Reason for Call: Other: Pt is calling and stating that she is wanting to know about the medication    topiramate (TOPAMAX) 100 MG tablet   How often she is able to take them Pt is also stating that she will need more of the medication due to taking them more often Pt also stated maybe a different medication also, Please call Pt back to discuss.     Action Taken: Message routed to:  [facility] Neurology    Travel Screening: Not Applicable

## 2024-01-29 NOTE — TELEPHONE ENCOUNTER
Caller spoke with pt to sched imaging ordered per Dr. Calloway. Pt stated they wanted those referrals sent to the [facility], message given to nurses.    1/29/24 BD

## 2024-02-29 ENCOUNTER — TELEPHONE (OUTPATIENT)
Dept: NEUROLOGY | Facility: CLINIC | Age: 49
End: 2024-02-29
Payer: COMMERCIAL

## 2024-02-29 DIAGNOSIS — G43.109 MIGRAINE WITH AURA AND WITHOUT STATUS MIGRAINOSUS, NOT INTRACTABLE: Primary | ICD-10-CM

## 2024-02-29 RX ORDER — METHYLPREDNISOLONE 4 MG
TABLET, DOSE PACK ORAL
Qty: 21 TABLET | Refills: 0 | Status: SHIPPED | OUTPATIENT
Start: 2024-02-29 | End: 2024-09-25

## 2024-02-29 NOTE — TELEPHONE ENCOUNTER
[facility] Call Center    Phone Message    May a detailed message be left on voicemail: yes     Reason for Call: Other: Pt is requesting a call back to discuss her recent MRI results. Pt also states that she started a migraine yesterday morning that will not go away. Pt states she would like to discuss this as well with the care team, discuss pain management options along with leave of work options.      Please contact Pt to further advise at [phone]    Action Taken: Message routed to:  [facility] Neurology     Travel Screening: Not Applicable

## 2024-02-29 NOTE — TELEPHONE ENCOUNTER
Medrol Dosepack ordered and sent to [facility]. Pt agreeable to plan. Will check in on Monday to see how she is doing.     Pt requesting work letter. Wanting it for an extended period of 2 weeks. Will get OK from Dr. Calloway.     Follow-up scheduled for 4/10/24.

## 2024-02-29 NOTE — TELEPHONE ENCOUNTER
"Headache Crisis February 29, 2024  Onset: 2/28, Wednesday  Description:                Type: Patient states \"complex\" migraine headache due to not being in specific pain, but experiencing an episode of blurry vision and dilated eyes (which are baseline symptoms when comparing to 1/24/24 visit note). Triggers: job  - She reports that normally these episodes last one day and resolve on their own, but this has been a consistent uncomfortable feeling.                  Location: N/A                 Duration:  2 days                  Pain scale rating:N/A, does not \"hurt\", just feeling uncomfortable.                  Are headaches getting more intense or more frequent: No    Is this headache similar to previous headaches? Yes.    Are you experiencing any new or different symptoms? No    Accompanying Signs & Symptoms:   Fever: No  Nausea or vomiting: No  Dizziness: No  Numbness: No  Weakness: No  Visual changes: YES, vision is blurry when looking further away at things, sensitive to light - baseline  Medications tried and outcome:  Tylenol with minor relief   On topirimate 100mg twice daily for prevention, no acute medications prescribed  Last visit 1/24/24    Plan:   - Advised her to keep taking OTC medications to assist in pain/uncomfortable feeling as prescribed + Zofran.  - Inquiring about FMLA paperwork, fax # provided. Care team will assist with paperwork once received.   - Yet to complete EEG that was ordered at last visit.   - MRI uploaded into [facility].   - Will relay symptoms to Dr. Calloway for further review and will follow up with recommendations      "

## 2024-02-29 NOTE — TELEPHONE ENCOUNTER
[facility] Call Center     Phone Message     May a detailed message be left on voicemail: yes      Reason for Call: Other: Pt is requesting a call back to discuss a letter to her employer for medical leave. Pt states she needs the letter by tomorrow 3/1/24.    Please contact Pt to further advise at [phone]     Action Taken: Message routed to:  [facility] Neurology      Travel Screening: Not Applicable

## 2024-03-01 ENCOUNTER — TELEPHONE (OUTPATIENT)
Dept: NEUROLOGY | Facility: CLINIC | Age: 49
End: 2024-03-01
Payer: COMMERCIAL

## 2024-03-01 DIAGNOSIS — G43.109 MIGRAINE WITH AURA AND WITHOUT STATUS MIGRAINOSUS, NOT INTRACTABLE: Primary | ICD-10-CM

## 2024-03-01 RX ORDER — SUMATRIPTAN 50 MG/1
50 TABLET, FILM COATED ORAL
Qty: 18 TABLET | Refills: 11 | Status: SHIPPED | OUTPATIENT
Start: 2024-03-01 | End: 2024-03-04

## 2024-03-01 NOTE — TELEPHONE ENCOUNTER
"States she is still having the episodes of her complex migraine. Took Medrol Dosepak around 730 this morning and stated that felt \"drunk\" about an hour afterwards. Feeling like she is woozy, delayed, with slurred speech (does not have slurred speech when speaking with her). Denies any other symptoms.     She does report that these symptoms also mimic her migraines as well. So unsure if Medrol Dosepack is the culprit to her symptoms.     Pt inquiring about Imitrex or triptan for different rescue medication. States her BP is controlled with Metoprolol and Lisinopril and now baseline BP is 100/70s. Will let Dr. Calloway know and follow-up based on recommendations.    Scheduled for 3/4 with Dr. Calloway.   "

## 2024-03-01 NOTE — TELEPHONE ENCOUNTER
[facility] Call Center    Phone Message    May a detailed message be left on voicemail: yes     Reason for Call: Symptoms or Concerns     If patient has red-flag symptoms, warm transfer to triage line    Current symptom or concern: Complex headaches    Symptoms have been present for:  4 day(s)    Has patient previously been seen for this? Yes    By Kacy Calloway    Are there any new or worsening symptoms? Yes: Pt is requesting a call back in regards to her having complex headaches for 4 days.     Pt says she took her steroid this morning (methylPREDNISolone (MEDROL DOSEPAK) 4 MG tablet therapy pack) and feels drunk from it.     Please call back to advise at # [phone].    Action Taken: Message routed to:  [facility] Neurology     Travel Screening: Not Applicable

## 2024-03-04 ENCOUNTER — OFFICE VISIT (OUTPATIENT)
Dept: NEUROLOGY | Facility: CLINIC | Age: 49
End: 2024-03-04
Payer: COMMERCIAL

## 2024-03-04 VITALS
OXYGEN SATURATION: 99 % | SYSTOLIC BLOOD PRESSURE: 123 MMHG | HEART RATE: 78 BPM | DIASTOLIC BLOOD PRESSURE: 85 MMHG | RESPIRATION RATE: 16 BRPM

## 2024-03-04 DIAGNOSIS — G43.109 MIGRAINE WITH AURA AND WITHOUT STATUS MIGRAINOSUS, NOT INTRACTABLE: ICD-10-CM

## 2024-03-04 DIAGNOSIS — Z96.652 TOTAL KNEE REPLACEMENT STATUS, LEFT: ICD-10-CM

## 2024-03-04 DIAGNOSIS — G43.709 CHRONIC MIGRAINE WITHOUT AURA WITHOUT STATUS MIGRAINOSUS, NOT INTRACTABLE: Primary | ICD-10-CM

## 2024-03-04 PROCEDURE — G2211 COMPLEX E/M VISIT ADD ON: HCPCS | Performed by: PSYCHIATRY & NEUROLOGY

## 2024-03-04 PROCEDURE — 99214 OFFICE O/P EST MOD 30 MIN: CPT | Performed by: PSYCHIATRY & NEUROLOGY

## 2024-03-04 RX ORDER — METOCLOPRAMIDE 10 MG/1
10 TABLET ORAL 3 TIMES DAILY PRN
Qty: 10 TABLET | Refills: 11 | Status: SHIPPED | OUTPATIENT
Start: 2024-03-04 | End: 2024-07-31

## 2024-03-04 RX ORDER — SUMATRIPTAN 100 MG/1
100 TABLET, FILM COATED ORAL
Qty: 18 TABLET | Refills: 11 | Status: SHIPPED | OUTPATIENT
Start: 2024-03-04 | End: 2024-07-31

## 2024-03-04 RX ORDER — FREMANEZUMAB-VFRM 225 MG/1.5ML
1.5 INJECTION SUBCUTANEOUS
Qty: 1.5 ML | Refills: 11 | Status: SHIPPED | OUTPATIENT
Start: 2024-03-04 | End: 2024-07-31

## 2024-03-04 RX ORDER — ONDANSETRON 4 MG/1
4-8 TABLET, ORALLY DISINTEGRATING ORAL EVERY 8 HOURS PRN
Qty: 10 TABLET | Refills: 11 | Status: SHIPPED | OUTPATIENT
Start: 2024-03-04 | End: 2024-07-31

## 2024-03-04 ASSESSMENT — PAIN SCALES - GENERAL: PAINLEVEL: MODERATE PAIN (5)

## 2024-03-04 NOTE — LETTER
"3/4/2024       RE: Leola Uriostegui  [address]  [place] [address]     Dear Colleague,    Thank you for referring your patient, Leola Uriostegui, to the [facility]UROLOG[facility] [place] [facility]. Please see a copy of my visit note below.    [facility]    Headache Neurology Progress Note  March 4, 2024    Subjective:    Leola Uriostegui returns for follow up of intermittent neurologic symptoms.     She had a return of episodes, less severe than previous, but more frequent. She also has bifrontal headache with these (tightness rating 8/10), with photophobia, nausea now. She has had slurred speech with them as all. These lasted 4 days, now stopping after an hour with sumatriptan.    She tried sumatriptan, metoclopramide, and Benadryl, which are helpful.    She continues topiramate 100 mg daily.     She reports 15/30 headache days per month, with 15/30 severe headache days per month.    Her new job required a fragmented sleep schedule.    From my previous note:   \"She has previously been on lamotrigine and Depakote for bipolar 1.  She reports Depakote was discontinued due to a change in her hair texture.  She is not sure why lamotrigine was discontinued but wonders if it was related to a liver abnormality.  She was placed on amitriptyline 25 mg at uncertain date ~2018 by gastroenterology for diarrhea, which was discontinued approximately 9/2019.\"     \"Chart review shows documented episodes of hypotension with SBP's in the 60s and 70s provided to her primary care provider on 01/20/2020 after which her metoprolol and lisinopril were held with improvement in blood pressures. She has taken blood pressure recordings during episodes of neurologic symptoms and has not revealed hypertension or hypotension during episodes.\"    Objective:    Vitals: /85   Pulse 78   Resp 16   SpO2 99%   General: Cooperative, " NAD  Neurologic:  Mental Status: Fully alert, attentive and oriented. Speech clear and fluent.   Cranial Nerves: Facial movements symmetric.   Motor: No abnormal movements.      Pertinent Investigations:    MRI brain wo and w contrast (2/28/2024):  -Mild nonspecific nonenhancing white matter lesions in the supratentorial brain which could also be seen as sequelae of chronic migraine.   -Negative for acute infarct or acute intracranial disease.     Assessment/Plan:   Leola Uriostegui is a 48 year old woman following up for intermittent neurologic symptoms with previously noted episodes of b/l blurry vision, gait instability, non sensical speech with a work up in the past including EEG and MRI brain without identifiable structural cause and there was no access to her EEG report although was reportedly normal. Her episodes resolved and would rarely occur after starting topiramate. She did not have another episode since 2021 until recently.    Episodes are more frequent and severe, with associated headaches, meeting criteria for chronic migraine with aura.  -Recommend she still complete EEG for completeness  -Continue sumatriptan at increased dose of 100 mg at onset, repeat dose in 2 hours if needed. Up to 9 days per month.  -Add metoclopramide and diphenhydramine as needed, up to 9 days per month.  -I recommend continuing topiramate 100 mg nightly and adding Ajovy subcutaneous injection every 30 days.    The longitudinal plan of care for Leola was addressed during this visit. Due to the added complexity in care, I will continue to support Leola in the subsequent management of this condition(s) and with the ongoing continuity of care of this condition(s). I will see her back in 3-6 months.    Kacy aClloway MD  Neurology       Again, thank you for allowing me to participate in the care of your patient.      Sincerely,    Kacy Calloway MD

## 2024-03-04 NOTE — PROGRESS NOTES
"[facility]    Headache Neurology Progress Note  March 4, 2024    Subjective:    Leola Uriostegui returns for follow up of intermittent neurologic symptoms.     She had a return of episodes, less severe than previous, but more frequent. She also has bifrontal headache with these (tightness rating 8/10), with photophobia, nausea now. She has had slurred speech with them as all. These lasted 4 days, now stopping after an hour with sumatriptan.    She tried sumatriptan, metoclopramide, and Benadryl, which are helpful.    She continues topiramate 100 mg daily.     She reports 15/30 headache days per month, with 15/30 severe headache days per month.    Her new job required a fragmented sleep schedule.    From my previous note:   \"She has previously been on lamotrigine and Depakote for bipolar 1.  She reports Depakote was discontinued due to a change in her hair texture.  She is not sure why lamotrigine was discontinued but wonders if it was related to a liver abnormality.  She was placed on amitriptyline 25 mg at uncertain date ~2018 by gastroenterology for diarrhea, which was discontinued approximately 9/2019.\"     \"Chart review shows documented episodes of hypotension with SBP's in the 60s and 70s provided to her primary care provider on 01/20/2020 after which her metoprolol and lisinopril were held with improvement in blood pressures. She has taken blood pressure recordings during episodes of neurologic symptoms and has not revealed hypertension or hypotension during episodes.\"    Objective:    Vitals: /85   Pulse 78   Resp 16   SpO2 99%   General: Cooperative, NAD  Neurologic:  Mental Status: Fully alert, attentive and oriented. Speech clear and fluent.   Cranial Nerves: Facial movements symmetric.   Motor: No abnormal movements.      Pertinent Investigations:    MRI brain wo and w contrast (2/28/2024):  -Mild nonspecific nonenhancing white matter lesions in the supratentorial " brain which could also be seen as sequelae of chronic migraine.   -Negative for acute infarct or acute intracranial disease.     Assessment/Plan:   Leola Uriostegui is a 48 year old woman following up for intermittent neurologic symptoms with previously noted episodes of b/l blurry vision, gait instability, non sensical speech with a work up in the past including EEG and MRI brain without identifiable structural cause and there was no access to her EEG report although was reportedly normal. Her episodes resolved and would rarely occur after starting topiramate. She did not have another episode since 2021 until recently.    Episodes are more frequent and severe, with associated headaches, meeting criteria for chronic migraine with aura.  -Recommend she still complete EEG for completeness  -Continue sumatriptan at increased dose of 100 mg at onset, repeat dose in 2 hours if needed. Up to 9 days per month.  -Add metoclopramide and diphenhydramine as needed, up to 9 days per month.  -I recommend continuing topiramate 100 mg nightly and adding Ajovy subcutaneous injection every 30 days.    The longitudinal plan of care for Leola was addressed during this visit. Due to the added complexity in care, I will continue to support Leola in the subsequent management of this condition(s) and with the ongoing continuity of care of this condition(s). I will see her back in 3-6 months.    Kacy Calloway MD  Neurology

## 2024-03-05 ENCOUNTER — TELEPHONE (OUTPATIENT)
Dept: NEUROLOGY | Facility: CLINIC | Age: 49
End: 2024-03-05
Payer: COMMERCIAL

## 2024-03-05 NOTE — TELEPHONE ENCOUNTER
Prior Authorization Retail Medication Request    Medication/Dose: Fremanezumab-vfrm (AJOVY) 225 MG/1.5ML SOAJ  Diagnosis and ICD code (if different than what is on RX):    Chronic migraine without aura without status migrainosus, not intractable [G43.709]        New/renewal/insurance change PA/secondary ins. PA:  Previously Tried and Failed:    Depakote  Topirimate  Amitriptyline    Rationale:  She reports 15/30 headache days per month, with 15/30 severe headache days per month. Migraine prevention.    Insurance   Primary: [facility]  Insurance ID:  [id number]    Pharmacy Information (if different than what is on RX)  Name:    Phone:    Fax:

## 2024-03-08 ENCOUNTER — TELEPHONE (OUTPATIENT)
Dept: NEUROLOGY | Facility: CLINIC | Age: 49
End: 2024-03-08
Payer: COMMERCIAL

## 2024-03-08 NOTE — TELEPHONE ENCOUNTER
Central Prior Authorization Team  Phone: [phone]    Prior Authorization Approval    Medication: AJOVY 225 MG/1.5ML SC SOAJ  Authorization Effective Date:    Authorization Expiration Date:    Approved Dose/Quantity:   Reference #:     Insurance Company: [employer] - Phone [phone] Fax [phone]  Expected CoPay: $    CoPay Card Available:      Financial Assistance Needed:   Which Pharmacy is filling the prescription: [facility], [place] [facility] [place] - [place], [place] - [address]  Pharmacy Notified: NO- CALLED PT  Patient Notified: YES

## 2024-03-11 ENCOUNTER — TELEPHONE (OUTPATIENT)
Dept: NEUROLOGY | Facility: CLINIC | Age: 49
End: 2024-03-11
Payer: COMMERCIAL

## 2024-03-11 ENCOUNTER — MYC MEDICAL ADVICE (OUTPATIENT)
Dept: NEUROLOGY | Facility: CLINIC | Age: 49
End: 2024-03-11
Payer: COMMERCIAL

## 2024-03-11 NOTE — TELEPHONE ENCOUNTER
[facility] Call Center    Phone Message    May a detailed message be left on voicemail: yes     Reason for Call: Form or Letter   Type or form/letter needing completion: Pt states that the provider wrote her a letter excusing her from work for the nex tseveral weeks. Pt is requesting a copy of the letter. Pt is requesting it be uploaded to [employer].   Provider: Dr. Calloway   Date form needed: 3/12/24  Once completed: Please upload to Pt's [employer]     Action Taken: Message routed to:  [facility] Neurology     Travel Screening: Not Applicable

## 2024-03-11 NOTE — TELEPHONE ENCOUNTER
Central Prior Authorization Team  Phone: [phone]    PA Initiation    Medication: AJOVY 225 MG/1.5ML SC SOAJ  Insurance Company: [place] Medicaid ([facility]) - Phone [phone] Fax [phone]  Pharmacy Filling the Rx: [facility], [place] [facility] - [place], [place] - [address]  Filling Pharmacy Phone: [phone]  Filling Pharmacy Fax:    Start Date: 3/11/2024

## 2024-03-11 NOTE — TELEPHONE ENCOUNTER
Prior Authorization Retail Medication Request     Medication/Dose: Fremanezumab-vfrm (AJOVY) 225 MG/1.5ML SOAJ  Diagnosis and ICD code (if different than what is on RX):    Chronic migraine without aura without status migrainosus, not intractable [G43.709]         New/renewal/insurance change PA/secondary ins. PA:  Previously Tried and Failed:    Depakote  Topirimate  Amitriptyline     Rationale:  She reports 15/30 headache days per month, with 15/30 severe headache days per month. Migraine prevention.     Insurance   Primary: Medicaid  Insurance ID:  [id number]     Pharmacy Information (if different than what is on RX)  Name:    Phone:    Fax:

## 2024-03-12 ENCOUNTER — TELEPHONE (OUTPATIENT)
Dept: NEUROLOGY | Facility: CLINIC | Age: 49
End: 2024-03-12
Payer: COMMERCIAL

## 2024-03-12 NOTE — TELEPHONE ENCOUNTER
Left Voicemail (1st Attempt) and Sent Mychart (1st Attempt) for the patient to call back and schedule the following:    Appointment type: EEG and MRI  Provider: Brodie  Return date: next avail  Specialty phone number: [phone]  Additional appointment(s) needed:   Additonal Notes:      3/12/24 BD

## 2024-03-12 NOTE — TELEPHONE ENCOUNTER
Central Prior Authorization Team  Phone: [phone]    Medication Appeal Initiation    Medication: AJOVY 225 MG/1.5ML SC SOAJ  Appeal Start Date:  3/12/2024  [place]  Insurance Phone:   Insurance Fax: [phone]  Comments:   submitted via cmm

## 2024-03-12 NOTE — TELEPHONE ENCOUNTER
Central Prior Authorization Team  Phone: [phone]    PRIOR AUTHORIZATION DENIED    Medication: AJOVY 225 MG/1.5ML SC SOAJ  Insurance Company: [place] Medicaid ([facility]) - Phone [phone] Fax [phone]  Denial Date: 3/12/2024  Denial Reason(s):         Appeal Information:         Patient Notified: no

## 2024-03-14 ENCOUNTER — TELEPHONE (OUTPATIENT)
Dept: NEUROLOGY | Facility: CLINIC | Age: 49
End: 2024-03-14
Payer: COMMERCIAL

## 2024-03-14 NOTE — TELEPHONE ENCOUNTER
Left Voicemail (2nd Attempt) and Sent Mychart (2nd Attempt) for the patient to call back and schedule the following:    Appointment type: EEG  Provider: Per Dr. Calloway  Return date: next avail.  Specialty phone number: [phone]  Additional appointment(s) needed:   Additonal Notes:     3/14/24 BD

## 2024-03-14 NOTE — TELEPHONE ENCOUNTER
Central Prior Authorization Team  Phone: [phone]    MEDICATION APPEAL DENIED    Medication: AJOVY 225 MG/1.5ML SC SOAJ  [place]  Denial Date: 3/14/2024  Denial Reason(s):           Second Level Appeal Information:   Second level appeals will be managed by the clinic staff and provider. Please contact the [employer] Prior Authorization Team if additional information about the denial is needed.

## 2024-03-21 ENCOUNTER — TELEPHONE (OUTPATIENT)
Dept: NEUROLOGY | Facility: CLINIC | Age: 49
End: 2024-03-21
Payer: COMMERCIAL

## 2024-03-21 NOTE — TELEPHONE ENCOUNTER
Per Dr Calloway's note, migraine rescue treatment   sumatriptan at increased dose of 100 mg at headache onset, repeat dose in 2 hours if needed. Up to 9 days per month.  -Add metoclopramide and diphenhydramine as needed, up to 9 days per month.  Did patient try any of the rescue treatment?   Ajovy -she just started. Its a preventative treatment and may take up to 6 months to work.   If headache red flags or new changes-should go to the ED  Thank you

## 2024-03-21 NOTE — TELEPHONE ENCOUNTER
[facility] Call Center    Phone Message    May a detailed message be left on voicemail: yes     Reason for Call: Symptoms or Concerns     If patient has red-flag symptoms, warm transfer to triage line    Current symptom or concern:   Headache    Symptoms have been present for:    2 day(s)    Are there any new or worsening symptoms? Yes: Pt states that after starting Fremanezumab-vfrm (AJOVY) 225 MG/1.5ML SOAJ on 03/19 she's been experiencing headaches. Would like to further discuss.     Please reach out to further advise    Action Taken: Other: Neurology    Travel Screening: Not Applicable

## 2024-03-21 NOTE — TELEPHONE ENCOUNTER
Headache Crisis March 21, 2024  Onset:   Description:                Type:                  Location:                   Duration:   days                  Pain scale rating:                 Are headaches getting more intense or more frequent:     Is this headache similar to previous headaches? Yes/no     Are you experiencing any new or different symptoms? Yes/No     Accompanying Signs & Symptoms:   Fever:   Nausea or vomiting:   Dizziness:  Numbness:   Weakness:   Visual changes:   Medications tried and outcome:       See other documentation

## 2024-03-21 NOTE — PROGRESS NOTES
Per Laure Beatty,    Per Dr Calloway's note, migraine rescue treatment   sumatriptan at increased dose of 100 mg at headache onset, repeat dose in 2 hours if needed. Up to 9 days per month.  -Add metoclopramide and diphenhydramine as needed, up to 9 days per month.  Did patient try any of the rescue treatment?   Ajovy -she just started. Its a preventative treatment and may take up to 6 months to work.   If headache red flags or new changes-should go to the emergency department    ----------------  Discussed the above information with patient. She verbalized understanding and agreement of the plan of care and repeated it back to writer.

## 2024-03-21 NOTE — PROGRESS NOTES
----------------------  EEG is not scheduled yet with [facility] Next appt with Dr. Calloway 4/10/24.  ------------------------  Patient used the Ajovy on 3/19/24 at 3:30 pm and woke up with the headache on 3/20    Headache Crisis 2024  Onset: 1 day(s) ago  Description:                Type: Regular headache that Aleve Should take care of                 Location: bilateral in the occipital area and Forehead, bilateral temple areas                 Duration:  2 days Went away for 1 hour after massage but came back - was able to sleep due to all her meds.                  Pain scale ratin/10                 Are headaches getting more intense or more frequent: YES- Yesterday more intense 10/10    Is this headache similar to previous headaches? NO     Are you experiencing any new or different symptoms? Yes in that Aleve wouldn't take care    Accompanying Signs & Symptoms:   Fever: No  Nausea or vomiting: No  Dizziness: No  Numbness: No  Weakness: No  Visual changes: No  Medications tried and outcome:  Naproxyn (Aleve) and narcotics ( morphine ) with no relief

## 2024-03-24 ENCOUNTER — HEALTH MAINTENANCE LETTER (OUTPATIENT)
Age: 49
End: 2024-03-24

## 2024-03-27 ENCOUNTER — CARE COORDINATION (OUTPATIENT)
Dept: NEUROLOGY | Facility: CLINIC | Age: 49
End: 2024-03-27
Payer: COMMERCIAL

## 2024-03-27 NOTE — TELEPHONE ENCOUNTER
Second level of appeal faxed to [facility] #[phone] with reconsideration form and clinic notes.

## 2024-03-27 NOTE — DISCHARGE INSTRUCTIONS
Discharge Instructions  Constipation  Constipation can cause severe cramping pain and your provider thinks this might be the cause of your abdominal pain (belly pain) today.  People usually recognize that they are constipated because they have difficulty having bowel movements, are not having bowel movements frequently enough, or are not having large enough bowel movements. Sometimes, especially in children or older people, you do not recognize that you are constipated until it becomes severe. The most common causes of constipation are a lack of exercise and not eating enough fruits, vegetables, and whole grains. Constipation can also be a side effect of medications, such as narcotics, or may be caused by a disease of the digestive system.    Generally, every Emergency Department visit should have a follow-up clinic visit with either a primary or a specialty clinic/provider. Please follow-up as instructed by your emergency provider today. Sometimes, chronic constipation requires further testing to determine the cause. If you are over 50 years old, you may need a colonoscopy if you have not had one before.     Return to the Emergency Department if:  Your abdominal pain worsens or does not improve after a bowel movement.  You become very weak.  You get a temperature above 102oF or as directed by your provider.  You have blood in your stools (bright red or black, tarry stools).  You keep vomiting (throwing up) or cannot drink liquids.  Your see blood when you vomit.  Your stomach gets bloated or bigger.  You have new symptoms or anything that worries you.    What can I do to help myself?  If your provider gave you a cathartic medication, like magnesium citrate or GoLytely  (polyethylene glycol), you can expect to have cramps and gas pains after taking it. You can expect to have a number of bowel movements and even diarrhea (loose or watery stools) in the course of clearing your bowels.  You will know your bowels    Problem: PHYSICAL THERAPY ADULT  Goal: Performs mobility at highest level of function for planned discharge setting.  See evaluation for individualized goals.  Description: Treatment/Interventions: Functional transfer training, LE strengthening/ROM, Therapeutic exercise, Endurance training, Cognitive reorientation, Patient/family training, Equipment eval/education, Bed mobility, Gait training, Compensatory technique education, Spoke to nursing  Equipment Recommended:  (continue RW use)       See flowsheet documentation for full assessment, interventions and recommendations.  Outcome: Progressing  Note: Prognosis: Fair  Problem List: Decreased strength, Decreased endurance, Decreased mobility, Decreased coordination, Decreased cognition, Impaired judgement, Decreased safety awareness, Pain, Orthopedic restrictions  Assessment: Pt seen for PT treatment session this date with interventions consisting of bed mobility tasks, transfer training, seated TE, gait training, and education provided as needed for safety and direction to improve functional mobility, safety awareness, and activity tolerance. Pt agreeable to PT treatment session upon arrival, pt found resting in bed. At end of session, pt left in bed with bed alarm activated and with all needs in reach. In comparison to previous session, pt with improvements in amount of activity tolerated . Continue to recommend Level II (Moderate Resource Intensity at time of d/c in order to maximize pt's functional independence and safety w/ mobility. Pt continues to be functioning below baseline level. PT will continue to see pt while here in order to address the deficits listed above and provide interventions consistent w/ POC in effort to achieve STGs.  Barriers to Discharge: Other (Comment) (pt resides in a group home setting)     Rehab Resource Intensity Level, PT: II (Moderate Resource Intensity)    See flowsheet documentation for full assessment.         have been cleaned out after you pass clear liquid. The cramps and gas should let up after you have emptied your bowels. You may want to wait until morning to take this type of medication so you aren t up in the night.   Sometimes instead of cathartics, we recommend laxatives like milk of magnesia to move your bowels more slowly, or an enema to help the bowels to move. Read and follow the package directions, or follow your provider s instructions.  Once you have become very constipated, it takes time for your bowels to return to normal and you need to be very careful to prevent becoming constipated again. Take a laxative if you do not move your bowels at least every two days.     Eat foods that have a lot of fiber. Good choices are fruits, vegetables, prune juice, apple juice, and high fiber cereal. Limit dairy products such as milk and cheese, since these can make constipation worse.   Drink plenty of water.   When you feel the need to go to the bathroom, go to the bathroom. Do not hold it.  Miralax , Metamucil , Colace , Senna or fiber supplements can be used daily.  Miralax  daily is often the best choice for children.  If you were given a prescription for medicine here today, be sure to read all of the information (including the package insert) that comes with your prescription.  This will include important information about the medicine, its side effects, and any warnings that you need to know about.  The pharmacist who fills the prescription can provide more information and answer questions you may have about the medicine.  If you have questions or concerns that the pharmacist cannot address, please call or return to the Emergency Department.   Remember that you can always come back to the Emergency Department if you are not able to see your regular provider in the amount of time listed above, if you get any new symptoms, or if there is anything that worries you.

## 2024-03-27 NOTE — PROGRESS NOTES
Short-Term Disability Paperwork completed and faxed to [facility] #[phone] and [phone]. Scanned into chart.

## 2024-03-27 NOTE — PROGRESS NOTES
[place] paperwork completed and faxed to [facility] [employer] #[phone]. Scanned into chart.

## 2024-03-28 NOTE — TELEPHONE ENCOUNTER
MEDICATION APPEAL APPROVED-2ND LEVEL    Medication: AJOVY 225 MG/1.5ML SC SOAJ  Authorization Effective Date:  03/01/24  Authorization Expiration Date:  03/31/2024  Approved Dose/Quantity:   Reference #:   [id number]  Appeal Insurance Company:   Expected CoPay: $       CoPay Card Available:    Financial Assistance Needed:   Filling Pharmacy: [facility], [place]  [place], [place] - [address]  Patient Notified:   Comments:       **Instructed pharmacy to notify patient when script is ready to /ship.**

## 2024-05-07 ENCOUNTER — TELEPHONE (OUTPATIENT)
Dept: NEUROLOGY | Facility: CLINIC | Age: 49
End: 2024-05-07
Payer: COMMERCIAL

## 2024-05-07 NOTE — TELEPHONE ENCOUNTER
Health Call Center    Phone Message    May a detailed message be left on voicemail: yes     Reason for Call: Other: Pt states she will be faxing a Form to Dr Calloway from the [facility] a Request for a medical opinion for Dr. Calloway to sign.    Pt is currently taking (IMITREX) 100 MG and states she will need a new prescription for a different medication and has questions on what medication she could take.     Please reach out the patient and advise. Pt can be reached at [phone]    Action Taken: Message routed to:  [facility] Neurology     Travel Screening: Not Applicable

## 2024-05-07 NOTE — TELEPHONE ENCOUNTER
Returned a call to Leola and had to leave a voice message asking for a call back.  I need to know what medication she is needing a refill

## 2024-05-24 ENCOUNTER — TELEPHONE (OUTPATIENT)
Dept: NEUROLOGY | Facility: CLINIC | Age: 49
End: 2024-05-24
Payer: COMMERCIAL

## 2024-05-24 NOTE — TELEPHONE ENCOUNTER
Retail Pharmacy Prior Authorization Team   Phone: [phone]    PA Initiation    Medication: AJOVY 225 MG/1.5ML SC SOAJ  Insurance Company: [employer] - Phone [phone] Fax [phone]  Pharmacy Filling the Rx: [facility], [place] - [address]  Filling Pharmacy Phone: [phone]  Filling Pharmacy Fax:    Start Date: 5/24/2024      Note: Due to record-high volumes, our turn-around time is taking longer than usual . We are currently 2 weeks behind in the pools.   We are working diligently to submit all requests in a timely manner and in the order they are received. Please only flag TRUE URGENT requests as high priority to the pool at this time.   If you have questions on status of PA's,  please send a note/message in the active PA encounter and send back to the [facility] PA pool [[id number]].    If you have questions about the turn-around time or about our process, please reach out to our supervisor Danna Abreu.   Thank you!   RPPA (Retail Pharmacy Prior Authorization) team      [place]. [place] does have active coverage through [facility].

## 2024-05-24 NOTE — TELEPHONE ENCOUNTER
Prior Authorization Retail Medication Request     Medication/Dose: Fremanezumab-vfrm (AJOVY) 225 MG/1.5ML SOAJ  Diagnosis and ICD code (if different than what is on RX):    Chronic migraine without aura without status migrainosus, not intractable [G43.709]         New/renewal/insurance change PA/secondary ins. PA:  Previously Tried and Failed:    Depakote  Topirimate  Amitriptyline     Rationale:  She reports 15/30 headache days per month, with 15/30 severe headache days per month. Migraine prevention.     Insurance   Primary: Medicaid  Insurance ID:  [id number]     Pharmacy Information (if different than what is on RX)  Name:    Phone:    Fax:

## 2024-05-24 NOTE — TELEPHONE ENCOUNTER
Retail Pharmacy Prior Authorization Team   Phone: [phone]    Prior Authorization Approval    Medication: AJOVY 225 MG/1.5ML SC SOAJ  Authorization Effective Date: 5/24/2024  Authorization Expiration Date: 12/31/2099  Insurance Company: [employer] - Phone [phone] Fax [phone]  Which Pharmacy is filling the prescription: [facility], [place]  [place], [place] - [address]  Pharmacy Notified: YES  Patient Notified: YES **Instructed pharmacy to notify patient when script is ready to /ship.**

## 2024-06-02 ENCOUNTER — HEALTH MAINTENANCE LETTER (OUTPATIENT)
Age: 49
End: 2024-06-02

## 2024-06-07 ENCOUNTER — MYC MEDICAL ADVICE (OUTPATIENT)
Dept: NEUROLOGY | Facility: CLINIC | Age: 49
End: 2024-06-07
Payer: COMMERCIAL

## 2024-06-07 DIAGNOSIS — G43.109 MIGRAINE WITH AURA AND WITHOUT STATUS MIGRAINOSUS, NOT INTRACTABLE: ICD-10-CM

## 2024-06-10 ENCOUNTER — TELEPHONE (OUTPATIENT)
Dept: NEUROLOGY | Facility: CLINIC | Age: 49
End: 2024-06-10
Payer: COMMERCIAL

## 2024-06-10 RX ORDER — TOPIRAMATE 100 MG/1
100 TABLET, FILM COATED ORAL 2 TIMES DAILY
Qty: 60 TABLET | Refills: 11 | Status: SHIPPED | OUTPATIENT
Start: 2024-06-10 | End: 2024-07-31

## 2024-06-10 NOTE — TELEPHONE ENCOUNTER
Left Voicemail (1st Attempt) and Sent Mychart (1st Attempt) for the patient to call back and schedule the following:    Appointment type: Sched EEG  Provider: Per Dr. Calloway  Return date: next avail  Specialty phone number: [phone], [phone]  Additional appointment(s) needed:   Additonal Notes:

## 2024-06-12 ENCOUNTER — TELEPHONE (OUTPATIENT)
Dept: NEUROLOGY | Facility: CLINIC | Age: 49
End: 2024-06-12
Payer: COMMERCIAL

## 2024-06-12 NOTE — TELEPHONE ENCOUNTER
Caller spoke with the patient regarding scheduling the EEG ordered by Dr. Calloway. Patient stated they are going to hold off on scheduling due to conditions improving.    Patient stated they will inform Dr. Calloway via MyC, caller informed RN's as well.    6/12/24 BD

## 2024-06-12 NOTE — TELEPHONE ENCOUNTER
Received message from Roxana.     Called patient to speak about EEG. EEG needing to be completed related to symptoms of gait instability, speech, and memory issues. Patient agreeable to plan.     Rescheduled follow-up with Dr. Calloway so patient is able to complete EEG prior. Yumiko has helped significantly with migraines.

## 2024-06-13 ENCOUNTER — TELEPHONE (OUTPATIENT)
Dept: NEUROLOGY | Facility: CLINIC | Age: 49
End: 2024-06-13
Payer: COMMERCIAL

## 2024-06-13 NOTE — TELEPHONE ENCOUNTER
Left Voicemail (1st Attempt) and Sent [employer] (1st Attempt) for the patient to call back and schedule the following:    Appointment type: Assist with sched EEG  Provider: Per Dr. Calloway  Return date: next avail  Specialty phone number: [phone] or [phone]  Additional appointment(s) needed: EEG  Additonal Notes:     LVM, in a MyC message caller provided the phone number to [facility] and [facility] call center to schedule their EEG ordered by Dr. Calloway

## 2024-06-18 ENCOUNTER — TELEPHONE (OUTPATIENT)
Dept: NEUROLOGY | Facility: CLINIC | Age: 49
End: 2024-06-18
Payer: COMMERCIAL

## 2024-06-18 NOTE — TELEPHONE ENCOUNTER
Sent Mychart (1st Attempt) and Left Voicemail (2nd Attempt) for the patient to call back and schedule the following:    Appointment type: Assist with Sched EEG  Provider: Per Dr. Calloway  Return date: next avail  Specialty phone number: [phone] or [phone]  Additional appointment(s) needed: EEG  Additonal Notes:

## 2024-06-18 NOTE — TELEPHONE ENCOUNTER
----- Message from Maria D Tee sent at 6/18/2024  9:04 AM CDT -----  Good Morning,    2 VM's were left and a MyC sent and the phone number to [facility] was provided for the pt to schedule their EEG per Dr. Calloway.    Thank you  ----- Message -----  From: Karmen Robb RN  Sent: 6/12/2024  11:09 AM CDT  To: Maria D Tee    Thanks Roxana!    I spoke to her stating she still needs EEG. She has a divorce hearing on 6/17 and would like to schedule after that is completed. Do you mind calling her next week to assist in scheduling EEG, please?    Thank you! :)  Karmen  ----- Message -----  From: Maria D Tee  Sent: 6/12/2024   8:42 AM CDT  To: General Neurology Nurses - [facility]    Good Morning,    I spoke with the patient regarding scheduling. They stated they are going to hold off for now as their headache seems to be improving due to the medication they are currently taking.     They also mentioned they will send a MyC message to Dr. Calloway regarding this as well.    Thank you

## 2024-07-10 ENCOUNTER — ANCILLARY PROCEDURE (OUTPATIENT)
Dept: NEUROLOGY | Facility: CLINIC | Age: 49
End: 2024-07-10
Payer: COMMERCIAL

## 2024-07-10 DIAGNOSIS — G43.109 MIGRAINE WITH AURA AND WITHOUT STATUS MIGRAINOSUS, NOT INTRACTABLE: ICD-10-CM

## 2024-07-10 PROCEDURE — 95816 EEG AWAKE AND DROWSY: CPT | Performed by: STUDENT IN AN ORGANIZED HEALTH CARE EDUCATION/TRAINING PROGRAM

## 2024-07-10 PROCEDURE — 2894A EEG AWAKE OR DROWSY ROUTINE: CPT | Performed by: STUDENT IN AN ORGANIZED HEALTH CARE EDUCATION/TRAINING PROGRAM

## 2024-07-19 ENCOUNTER — MYC MEDICAL ADVICE (OUTPATIENT)
Dept: NEUROLOGY | Facility: CLINIC | Age: 49
End: 2024-07-19
Payer: COMMERCIAL

## 2024-07-22 NOTE — TELEPHONE ENCOUNTER
LVM - advising her to go to the ER if she experiencing new symptoms - altered sensation, weakness on one side, slurred speech, facial drooping. Recommended that she call back to discuss as well. Clinic # provided.

## 2024-07-24 NOTE — TELEPHONE ENCOUNTER
"On 7/19 she started scratching her head then noticed that she could not feel herself scratching, then she tried brushing her hair and still could not feel the bristles. She said the tip of her right ear was also numb. Denies any known triggers and came out of nowhere. No facial numbness/tingling, just where hair is and right ear tip. Denies slurred speech, weakness, imbalance, facial droop during this time. She did state she had blurry vision throughout the weekend which is a typical migraine symptom for her. She took Imitrex, which resolved the blurry vision but the numbness remained. Numbness resolved on Monday. Has not experienced any further symptoms since.     She also did bring up another \"black out\" event on 7/1. She was in a parking lot headed to the pharmacy when she was talking on the phone. Her friend told her that Leola started to slow her speech down and slur her words, then she stopped responding on the phone. Leola reports waking up 2 hours after in her car with no recollection of what had happened. Her friend called her ~15 times and she did not wake up to any of these calls. She said she was slow to respond, groggy, and felt out of it afterwards. She went home, slept 13 hours, then felt fine the next morning. Denies hitting her head that she is aware of. Did not get evaluated after this episode. She has not had any episodes since. All symptoms have now resolved.     No known acute triggers for either event. She is going through divorce, which has brought financial struggles. But this has been ongoing. No new medication changes. Next follow-up with Dr. Calloway on 9/9. Completed EEG on 7/10, where it did not show any seizure activity.     Discussed with patient if she does have another episode where she becomes unconscious, it is advised to seek evaluation in the ER. If she does start having new symptoms (altered sensation, slurred speech, imbalance, facial droop, etc), also advised to go into ER. Pt " agreeable.

## 2024-07-25 NOTE — TELEPHONE ENCOUNTER
Scheduled for 7/31 for virtual visit. She did state that numbness on her head started one day after taking Ajovy, unsure if this is related or side effect. Can be addressed in next visit.

## 2024-07-31 ENCOUNTER — VIRTUAL VISIT (OUTPATIENT)
Dept: NEUROLOGY | Facility: CLINIC | Age: 49
End: 2024-07-31
Payer: COMMERCIAL

## 2024-07-31 VITALS — WEIGHT: 115 LBS | BODY MASS INDEX: 21.16 KG/M2 | HEIGHT: 62 IN

## 2024-07-31 DIAGNOSIS — Z96.652 TOTAL KNEE REPLACEMENT STATUS, LEFT: ICD-10-CM

## 2024-07-31 DIAGNOSIS — G43.709 CHRONIC MIGRAINE WITHOUT AURA WITHOUT STATUS MIGRAINOSUS, NOT INTRACTABLE: ICD-10-CM

## 2024-07-31 DIAGNOSIS — G43.109 MIGRAINE WITH AURA AND WITHOUT STATUS MIGRAINOSUS, NOT INTRACTABLE: ICD-10-CM

## 2024-07-31 PROCEDURE — G2211 COMPLEX E/M VISIT ADD ON: HCPCS | Mod: 95 | Performed by: PSYCHIATRY & NEUROLOGY

## 2024-07-31 PROCEDURE — 99215 OFFICE O/P EST HI 40 MIN: CPT | Mod: 95 | Performed by: PSYCHIATRY & NEUROLOGY

## 2024-07-31 RX ORDER — METHOCARBAMOL 500 MG/1
500 TABLET, FILM COATED ORAL 3 TIMES DAILY PRN
COMMUNITY

## 2024-07-31 RX ORDER — SUMATRIPTAN 100 MG/1
100 TABLET, FILM COATED ORAL
Qty: 18 TABLET | Refills: 11 | Status: SHIPPED | OUTPATIENT
Start: 2024-07-31 | End: 2024-09-25

## 2024-07-31 RX ORDER — FREMANEZUMAB-VFRM 225 MG/1.5ML
1.5 INJECTION SUBCUTANEOUS
Qty: 1.5 ML | Refills: 11 | Status: SHIPPED | OUTPATIENT
Start: 2024-07-31

## 2024-07-31 RX ORDER — LIOTHYRONINE SODIUM 5 UG/1
5 TABLET ORAL 2 TIMES DAILY
COMMUNITY

## 2024-07-31 RX ORDER — ZONISAMIDE 100 MG/1
100 CAPSULE ORAL DAILY
Qty: 90 CAPSULE | Refills: 3 | Status: SHIPPED | OUTPATIENT
Start: 2024-07-31

## 2024-07-31 RX ORDER — LEVOTHYROXINE SODIUM 75 UG/1
TABLET ORAL
COMMUNITY

## 2024-07-31 RX ORDER — METOCLOPRAMIDE 10 MG/1
10 TABLET ORAL 3 TIMES DAILY PRN
Qty: 10 TABLET | Refills: 11 | Status: SHIPPED | OUTPATIENT
Start: 2024-07-31

## 2024-07-31 ASSESSMENT — HEADACHE IMPACT TEST (HIT 6)
HIT6 TOTAL SCORE: 72
HOW OFTEN HAVE YOU FELT TOO TIRED TO WORK BECAUSE OF YOUR HEADACHES: SOMETIMES
HOW OFTEN DO HEADACHES LIMIT YOUR DAILY ACTIVITIES: ALWAYS
HIT6 TOTAL SCORE: 72
HOW OFTEN HAVE YOU FELT FED UP OR IRRITATED BECAUSE OF YOUR HEADACHES: ALWAYS
HOW OFTEN DID HEADACHS LIMIT CONCENTRATION ON WORK OR DAILY ACTIVITY: ALWAYS
WHEN YOU HAVE HEADACHES HOW OFTEN IS THE PAIN SEVERE: SOMETIMES
WHEN YOU HAVE A HEADACHE HOW OFTEN DO YOU WISH YOU COULD LIE DOWN: ALWAYS
HOW OFTEN DID HEADACHS LIMIT CONCENTRATION ON WORK OR DAILY ACTIVITY: ALWAYS
WHEN YOU HAVE HEADACHES HOW OFTEN IS THE PAIN SEVERE: SOMETIMES
HOW OFTEN DO HEADACHES LIMIT YOUR DAILY ACTIVITIES: ALWAYS
HOW OFTEN HAVE YOU FELT TOO TIRED TO WORK BECAUSE OF YOUR HEADACHES: SOMETIMES
WHEN YOU HAVE A HEADACHE HOW OFTEN DO YOU WISH YOU COULD LIE DOWN: ALWAYS
HOW OFTEN HAVE YOU FELT FED UP OR IRRITATED BECAUSE OF YOUR HEADACHES: ALWAYS

## 2024-07-31 ASSESSMENT — MIGRAINE DISABILITY ASSESSMENT (MIDAS)
HOW MANY DAYS DID YOU MISS WORK OR SCHOOL BECAUSE OF HEADACHES: 5
HOW MANY DAYS IN THE PAST 3 MONTHS HAVE YOU HAD A HEADACHE: 20
ON A SCALE FROM 0-10 ON AVERAGE HOW PAINFUL WERE HEADACHES: 10
TOTAL SCORE: 28
HOW MANY DAYS WAS YOUR PRODUCTIVITY CUT IN HALF BECAUSE OF HEADACHES: 5
HOW OFTEN WERE SOCIAL ACTIVITIES MISSED DUE TO HEADACHES: 3
HOW MANY DAYS WAS HOUSEWORK PRODUCTIVITY CUT IN HALF DUE TO HEADACHES: 5
HOW MANY DAYS DID YOU NOT DO HOUSEWORK BECAUSE OF HEADACHES: 10

## 2024-07-31 ASSESSMENT — PAIN SCALES - GENERAL: PAINLEVEL: SEVERE PAIN (7)

## 2024-07-31 NOTE — LETTER
7/31/2024       RE: Leola Uriostegui  [address]  [place] [address]     Dear Colleague,    Thank you for referring your patient, Leola Uriostegui, to the [facility] [place] at [facility]. Please see a copy of my visit note below.      [facility]    Headache Neurology Progress Note  July 31, 2024      Assessment/Plan:   Leola Uriostegui is a 49 year old woman following up for intermittent neurologic symptoms with previously noted episodes of b/l blurry vision, gait instability, non sensical speech with a work up in the past including EEG and MRI brain without identifiable structural cause and there was no access to her EEG report although was reportedly normal. Her episodes resolved and would rarely occur after starting topiramate, then returned in the setting of stress.     Episodes were with associated headaches, meeting criteria for chronic migraine with aura. Ajovy helped reduce them. She still gets episodes with neurologic symptoms that are triggered by stress.  -Recommend she continue Ajovy and change topiramate to zonisamide 100 mg. We will see if this helps with side effects of topiramate.  -Hopefully, divorce and work stress are improving soon. I referred her to mental health as well; she describes desire to work with someone specifically on mental health. Hx ADHD.   -Continue sumatriptan at increased dose of 100 mg at onset, repeat dose in 2 hours if needed. Up to 9 days per month.  -Add metoclopramide and diphenhydramine as needed, up to 9 days per month.    The longitudinal plan of care for Leola was addressed during this visit. Due to the added complexity in care, I will continue to support Leola in the subsequent management of this condition(s) and with the ongoing continuity of care of this condition(s). I will see her back in 6 months. I spent 40 minutes on record review, patient care, and  "documentation of care today.    Kacy Calloway MD  Neurology     Subjective:    Leola Uriostegui returns for follow up of intermittent neurologic symptoms, migraine.      She had a return of episodes in the setting of work stress. She just quit her job and has another lined up.     Episodes were frequent after working several shifts in a row. Her new job should be a better experience, she thinks.    She also has bifrontal headache, as well as other locations on head, which can be severe and associated with photophobia, nausea. She has had slurred speech and blurred vision with them as all. Sumatriptan helps with symptoms. Reglan works better than Zofran.     Ajovy has been helpful in reducing headache severity, despite additional triggers.  Topiramate 100 mg is not particularly helpful. She is interested in similar medication. Wonders if memory and language errors are a side effect.     She reports 15/30 headache days per month, with 5/30 severe headache days per month.     From my previous note:   \"She has previously been on lamotrigine and Depakote for bipolar 1.  She reports Depakote was discontinued due to a change in her hair texture.  She is not sure why lamotrigine was discontinued but wonders if it was related to a liver abnormality.  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 When she came to, she felt squeezing inside her brain. She was acting slow, abnormal speech. She was slow processing and thinking the next day too. Improved by 12 or 1 pm. Imitrex which helped, but slow processing continued.    She also noticed head numbness x 1 day. Spontaneously resolved.    She also had a bout of colitis, she reports.    From previous chart note:  \"Chart review " "shows documented episodes of hypotension with SBP's in the 60s and 70s provided to her primary care provider on 01/20/2020 after which her metoprolol and lisinopril were held with improvement in blood pressures. She has taken blood pressure recordings during episodes of neurologic symptoms and has not revealed hypertension or hypotension during episodes.\"    On 7/19 she started scratching her head then noticed that she could not feel herself scratching, then she tried brushing her hair and still could not feel the bristles. She said the tip of her right ear was also numb. Denies any known triggers and came out of nowhere. No facial numbness/tingling, just where hair is and right ear tip. Denies slurred speech, weakness, imbalance, facial droop during this time. She did state she had blurry vision throughout the weekend which is a typical migraine symptom for her. She took Imitrex, which resolved the blurry vision but the numbness remained. Numbness resolved on Monday. Has not experienced any further symptoms since.      She also did bring up another \"black out\" event on 7/1. She was in a parking lot headed to the pharmacy when she was talking on the phone. Her friend told her that Leola started to slow her speech down and slur her words, then she stopped responding on the phone. Leola reports waking up 2 hours after in her car with no recollection of what had happened. Her friend called her ~15 times and she did not wake up to any of these calls. She said she was slow to respond, groggy, and felt out of it afterwards. She went home, slept 13 hours, then felt fine the next morning. Denies hitting her head that she is aware of. Did not get evaluated after this episode. She has not had any episodes since. All symptoms have now resolved.      No known acute triggers for either event. She is going through divorce, which has brought financial struggles. But this has been ongoing. No new medication changes. Next follow-up " "with Dr. Calloway on 9/9. Completed EEG on 7/10, where it did not show any seizure activity.      Discussed with patient if she does have another episode where she becomes unconscious, it is advised to seek evaluation in the ER. If she does start having new symptoms (altered sensation, slurred speech, imbalance, facial droop, etc), also advised to go into ER. Pt agreeable\"      Objective:    Vitals: Ht 1.575 m (5' 2\")   Wt 52.2 kg (115 lb)   BMI 21.03 kg/m    General: Cooperative, NAD  Neurologic:  Mental Status: Fully alert, attentive and oriented. Speech clear and fluent.     Pertinent Investigations:          12/1/2021     8:35 AM 1/24/2024     8:43 AM 7/31/2024    12:30 PM   HIT-6   When you have headaches, how often is the pain severe 6 6 10   How often do headaches limit your ability to do usual daily activities including household work, work, school, or social activities? 8 10 13   When you have a headache, how often do you wish you could lie down? 13 13 13   In the past 4 weeks, how often have you felt too tired to do work or daily activities because of your headaches 6 13 10   In the past 4 weeks, how often have you felt fed up or irritated because of your headaches 6 10 13   In the past 4 weeks, how often did headaches limit your ability to concentrate on work or daily activities 6 10 13   HIT-6 Total Score 45 62 72           1/24/2024     8:44 AM   MIDAS - in the past three months:   On how many days did you miss work or school because of your headaches? 3   How many days was your productivity at work or school reduced by half or more because of your headaches? 3   On how many days did you not do household work because of your headaches? 3   How many days was your productivity in household work reduced by half or more because of your headaches? 3   On how many days did you miss family, social, or leisure activities because of your headaches? 3   On how many days did you have a headache? 3   On a scale of " 0-10, on average how painful were these headaches? 0   MIDAS Score 15 (III - Moderate Disability)          Again, thank you for allowing me to participate in the care of your patient.      Sincerely,    Kacy Calloway MD

## 2024-07-31 NOTE — NURSING NOTE
Current patient location: [address] [place] [address]  [id number]    Is the patient currently in the state of [place]? YES    Visit mode:VIDEO    If the visit is dropped, the patient can be reconnected by: VIDEO VISIT: Text to cell phone:   Telephone Information:   Mobile [phone]       Will anyone else be joining the visit? NO  (If patient encounters technical issues they should call [phone]    How would you like to obtain your AVS? MyChart    Are changes needed to the allergy or medication list? Yes Pt states she is also taking multiple medications from her endocrinologist.     Are refills needed on medications prescribed by this physician? NO    Rooming Documentation:  Assigned questionnaire(s) completed      Reason for visit: RECHMARLENE KIMF

## 2024-07-31 NOTE — PROGRESS NOTES
Virtual Visit Details    Type of service:  Video Visit   Video Start Time: 12:36 PM  Video End Time: 1:06 PM    Originating Location (pt. Location): Home    Distant Location (provider location):  Off-site  Platform used for Video Visit: [facility]    Headache Neurology Progress Note  July 31, 2024      Assessment/Plan:   Leola Uriostegui is a 49 year old woman following up for intermittent neurologic symptoms with previously noted episodes of b/l blurry vision, gait instability, non sensical speech with a work up in the past including EEG and MRI brain without identifiable structural cause and there was no access to her EEG report although was reportedly normal. Her episodes resolved and would rarely occur after starting topiramate, then returned in the setting of stress.     Episodes were with associated headaches, meeting criteria for chronic migraine with aura. Ajovy helped reduce them. She still gets episodes with neurologic symptoms that are triggered by stress.  -Recommend she continue Ajovy and change topiramate to zonisamide 100 mg. We will see if this helps with side effects of topiramate.  -Hopefully, divorce and work stress are improving soon. I referred her to mental health as well; she describes desire to work with someone specifically on mental health. Hx ADHD.   -Continue sumatriptan at increased dose of 100 mg at onset, repeat dose in 2 hours if needed. Up to 9 days per month.  -Add metoclopramide and diphenhydramine as needed, up to 9 days per month.    The longitudinal plan of care for Leola was addressed during this visit. Due to the added complexity in care, I will continue to support Leola in the subsequent management of this condition(s) and with the ongoing continuity of care of this condition(s). I will see her back in 6 months. I spent 40 minutes on record review, patient care, and documentation of care today.    Kacy Calloway MD  Neurology  "    Subjective:    Leola Uriostegui returns for follow up of intermittent neurologic symptoms, migraine.      She had a return of episodes in the setting of work stress. She just quit her job and has another lined up.     Episodes were frequent after working several shifts in a row. Her new job should be a better experience, she thinks.    She also has bifrontal headache, as well as other locations on head, which can be severe and associated with photophobia, nausea. She has had slurred speech and blurred vision with them as all. Sumatriptan helps with symptoms. Reglan works better than Zofran.     Ajovy has been helpful in reducing headache severity, despite additional triggers.  Topiramate 100 mg is not particularly helpful. She is interested in similar medication. Wonders if memory and language errors are a side effect.     She reports 15/30 headache days per month, with 5/30 severe headache days per month.     From my previous note:   \"She has previously been on lamotrigine and Depakote for bipolar 1.  She reports Depakote was discontinued due to a change in her hair texture.  She is not sure why lamotrigine was discontinued but wonders if it was related to a liver abnormality.  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 When she came to, she felt squeezing inside her brain. She was acting slow, abnormal speech. She was slow processing and thinking the next day too. Improved by 12 or 1 pm. Imitrex which helped, but slow processing continued.    She also noticed head numbness x 1 day. Spontaneously resolved.    She also had a bout of colitis, she reports.    From previous chart note:  \"Chart review shows documented episodes of hypotension with SBP's in the 60s " "and 70s provided to her primary care provider on 01/20/2020 after which her metoprolol and lisinopril were held with improvement in blood pressures. She has taken blood pressure recordings during episodes of neurologic symptoms and has not revealed hypertension or hypotension during episodes.\"    On 7/19 she started scratching her head then noticed that she could not feel herself scratching, then she tried brushing her hair and still could not feel the bristles. She said the tip of her right ear was also numb. Denies any known triggers and came out of nowhere. No facial numbness/tingling, just where hair is and right ear tip. Denies slurred speech, weakness, imbalance, facial droop during this time. She did state she had blurry vision throughout the weekend which is a typical migraine symptom for her. She took Imitrex, which resolved the blurry vision but the numbness remained. Numbness resolved on Monday. Has not experienced any further symptoms since.      She also did bring up another \"black out\" event on 7/1. She was in a parking lot headed to the pharmacy when she was talking on the phone. Her friend told her that Leola started to slow her speech down and slur her words, then she stopped responding on the phone. Leola reports waking up 2 hours after in her car with no recollection of what had happened. Her friend called her ~15 times and she did not wake up to any of these calls. She said she was slow to respond, groggy, and felt out of it afterwards. She went home, slept 13 hours, then felt fine the next morning. Denies hitting her head that she is aware of. Did not get evaluated after this episode. She has not had any episodes since. All symptoms have now resolved.      No known acute triggers for either event. She is going through divorce, which has brought financial struggles. But this has been ongoing. No new medication changes. Next follow-up with Dr. Calloway on 9/9. Completed EEG on 7/10, where it did " "not show any seizure activity.      Discussed with patient if she does have another episode where she becomes unconscious, it is advised to seek evaluation in the ER. If she does start having new symptoms (altered sensation, slurred speech, imbalance, facial droop, etc), also advised to go into ER. Pt agreeable\"      Objective:    Vitals: Ht 1.575 m (5' 2\")   Wt 52.2 kg (115 lb)   BMI 21.03 kg/m    General: Cooperative, NAD  Neurologic:  Mental Status: Fully alert, attentive and oriented. Speech clear and fluent.     Pertinent Investigations:          12/1/2021     8:35 AM 1/24/2024     8:43 AM 7/31/2024    12:30 PM   HIT-6   When you have headaches, how often is the pain severe 6 6 10   How often do headaches limit your ability to do usual daily activities including household work, work, school, or social activities? 8 10 13   When you have a headache, how often do you wish you could lie down? 13 13 13   In the past 4 weeks, how often have you felt too tired to do work or daily activities because of your headaches 6 13 10   In the past 4 weeks, how often have you felt fed up or irritated because of your headaches 6 10 13   In the past 4 weeks, how often did headaches limit your ability to concentrate on work or daily activities 6 10 13   HIT-6 Total Score 45 62 72           1/24/2024     8:44 AM   MIDAS - in the past three months:   On how many days did you miss work or school because of your headaches? 3   How many days was your productivity at work or school reduced by half or more because of your headaches? 3   On how many days did you not do household work because of your headaches? 3   How many days was your productivity in household work reduced by half or more because of your headaches? 3   On how many days did you miss family, social, or leisure activities because of your headaches? 3   On how many days did you have a headache? 3   On a scale of 0-10, on average how painful were these headaches? 0   MIDAS " Score 15 (III - Moderate Disability)

## 2024-09-25 ENCOUNTER — VIRTUAL VISIT (OUTPATIENT)
Dept: NEUROLOGY | Facility: CLINIC | Age: 49
End: 2024-09-25
Payer: COMMERCIAL

## 2024-09-25 DIAGNOSIS — G43.709 CHRONIC MIGRAINE WITHOUT AURA WITHOUT STATUS MIGRAINOSUS, NOT INTRACTABLE: Primary | ICD-10-CM

## 2024-09-25 DIAGNOSIS — M54.81 OCCIPITAL NEURALGIA OF RIGHT SIDE: ICD-10-CM

## 2024-09-25 PROCEDURE — 99214 OFFICE O/P EST MOD 30 MIN: CPT | Mod: 95 | Performed by: PSYCHIATRY & NEUROLOGY

## 2024-09-25 PROCEDURE — G2211 COMPLEX E/M VISIT ADD ON: HCPCS | Mod: 95 | Performed by: PSYCHIATRY & NEUROLOGY

## 2024-09-25 RX ORDER — RIZATRIPTAN BENZOATE 10 MG/1
10 TABLET ORAL
Qty: 18 TABLET | Refills: 11 | Status: SHIPPED | OUTPATIENT
Start: 2024-09-25

## 2024-09-25 ASSESSMENT — MIGRAINE DISABILITY ASSESSMENT (MIDAS)
HOW MANY DAYS IN THE PAST 3 MONTHS HAVE YOU HAD A HEADACHE: 60
TOTAL SCORE: 58
HOW MANY DAYS WAS YOUR PRODUCTIVITY CUT IN HALF BECAUSE OF HEADACHES: 15
HOW MANY DAYS DID YOU MISS WORK OR SCHOOL BECAUSE OF HEADACHES: 3
ON A SCALE FROM 0-10 ON AVERAGE HOW PAINFUL WERE HEADACHES: 10
HOW OFTEN WERE SOCIAL ACTIVITIES MISSED DUE TO HEADACHES: 5
HOW MANY DAYS DID YOU NOT DO HOUSEWORK BECAUSE OF HEADACHES: 15
HOW MANY DAYS WAS HOUSEWORK PRODUCTIVITY CUT IN HALF DUE TO HEADACHES: 20

## 2024-09-25 ASSESSMENT — HEADACHE IMPACT TEST (HIT 6)
WHEN YOU HAVE A HEADACHE HOW OFTEN DO YOU WISH YOU COULD LIE DOWN: ALWAYS
HIT6 TOTAL SCORE: 78
WHEN YOU HAVE HEADACHES HOW OFTEN IS THE PAIN SEVERE: ALWAYS
HOW OFTEN HAVE YOU FELT TOO TIRED TO WORK BECAUSE OF YOUR HEADACHES: ALWAYS
HOW OFTEN DID HEADACHS LIMIT CONCENTRATION ON WORK OR DAILY ACTIVITY: ALWAYS
HOW OFTEN HAVE YOU FELT FED UP OR IRRITATED BECAUSE OF YOUR HEADACHES: ALWAYS
HOW OFTEN DO HEADACHES LIMIT YOUR DAILY ACTIVITIES: ALWAYS

## 2024-09-25 NOTE — PROGRESS NOTES
Virtual Visit Details    Type of service:  Video Visit   Video Start Time:  9:05 am  Video End Time: 9:28 AM    Originating Location (pt. Location): Home    Distant Location (provider location):  Off-site  Platform used for Video Visit: [facility]    Headache Neurology Progress Note  September 25, 2024      Assessment/Plan:   Leola Uriostegui is a 49 year old woman following up for intermittent neurologic symptoms with previously noted episodes of b/l blurry vision, gait instability, non sensical speech with a work up in the past including EEG and MRI brain without identifiable structural cause and there was no access to her EEG report although was reportedly normal. Her episodes resolved and would rarely occur after starting topiramate, then returned in the setting of stress.     Episodes were associated headaches sometimes, meeting criteria for chronic migraine with aura. Ajovy helped reduce them. She still gets episodes with neurologic symptoms that are triggered by stress, but lately, has had more headaches without neurologic symptoms.    Newer right posterior radiating pain (tingling, needles) shows more like occipital neuralgia than migraine. Recommend further evaluation of cervical spine and possible occipital nerve block through PM&R.  -PM&R referral for cervical spine/ONB    -Recommend she continue Ajovy and zonisamide 100 mg. Tolerating well.  -Trigger of stress continues to be an issue. Interested in trauma therapy. Will set up on her own.  -Trial rizatriptan 10 mg at onset, repeat dose in 2 hours if needed. Up to 9 days per month.  -Add metoclopramide and diphenhydramine as needed, up to 9 days per month.    Interested in clinical trials for headache.      The longitudinal plan of care for Leola was addressed during this visit. Due to the added complexity in care, I will continue to support Leola in the subsequent management of this condition(s) and with the ongoing  "continuity of care of this condition(s).    Kacy Calloway MD  Neurology     Subjective:    Leola Uriostegui returns for follow up of chronic migraine.    She describes more headache with radiation forward from the back of the head on the right side. Moreso since about 2-3 months ago.    Takes Ajovy and zonisamide 100 mg continue. Ajovy is helpful.    Trigger: stress  Trauma in past, interested in course for trauma.    She has had headache daily lately. Feels like hot needles in her eye.    Her chiropractor has adjusting her since 3 weeks ago. Occipital manipulation was helpful, felt like return of blood flow to her head. This has been helpful a few times. No headache since Saturday.    \"Just documenting a new headache woke up with it started into my right temple then kept moving downwards into my ear and jaw and right eye. Ear is ringing. I took all meds Aleve, morphine, and imtex\"     She quit her new job recently. By 10 am, headache returned daily and was severe. Triggers: screens, poor ergonomics.     Imitrex is not consistently helpful.    Objective:    Vitals: There were no vitals taken for this visit.  General: Cooperative, NAD  Neurologic:  Mental Status: Fully alert, attentive and oriented. Speech clear and fluent.   Cranial Nerves: Facial movements symmetric.   Motor: No abnormal movements.      Pertinent Investigations:          1/24/2024     8:43 AM 7/31/2024    12:30 PM 9/25/2024     8:30 AM   HIT-6   When you have headaches, how often is the pain severe 6 10 13   How often do headaches limit your ability to do usual daily activities including household work, work, school, or social activities? 10 13 13   When you have a headache, how often do you wish you could lie down? 13 13 13   In the past 4 weeks, how often have you felt too tired to do work or daily activities because of your headaches 13 10 13   In the past 4 weeks, how often have you felt fed up or irritated because of your headaches 10 13 13 "   In the past 4 weeks, how often did headaches limit your ability to concentrate on work or daily activities 10 13 13   HIT-6 Total Score 62 72 78           1/24/2024     8:44 AM 7/31/2024    12:34 PM 9/25/2024     8:32 AM   MIDAS - in the past three months:   On how many days did you miss work or school because of your headaches? 3 5 3   How many days was your productivity at work or school reduced by half or more because of your headaches? 3 5 15   On how many days did you not do household work because of your headaches? 3 10 15   How many days was your productivity in household work reduced by half or more because of your headaches? 3 5 20   On how many days did you miss family, social, or leisure activities because of your headaches? 3 3 5   On how many days did you have a headache? 3 20 60   On a scale of 0-10, on average how painful were these headaches? 0 10 10   MIDAS Score 15 (III - Moderate Disability) 28 (IV - Severe Disability) 58 (IV - Severe Disability)

## 2024-09-25 NOTE — NURSING NOTE
Current patient location: [address] [place] [address]  [id number]    Is the patient currently in the state of [place]? YES    Visit mode:VIDEO    If the visit is dropped, the patient can be reconnected by: TELEPHONE VISIT: Phone number:   Telephone Information:   Mobile [phone]       Will anyone else be joining the visit? NO  (If patient encounters technical issues they should call [phone]    How would you like to obtain your AVS? MyChart    Are changes needed to the allergy or medication list? Pt stated no med changes    Are refills needed on medications prescribed by this physician? NO    Rooming Documentation:  Questionnaire(s) completed    Reason for visit: Video Visit (Migraine follow-up )    Jodie HARRIS

## 2024-09-25 NOTE — LETTER
9/25/2024       RE: Leola Uriostegui  [address]  [place] [address]     Dear Colleague,    Thank you for referring your patient, Leola Uriostegui, to the [facility] [place] at [facility]. Please see a copy of my visit note below.    Virtual Visit Details    Type of service:  Video Visit   Video Start Time:  9:05 am  Video End Time: 9:28 AM    Originating Location (pt. Location): Home    Distant Location (provider location):  Off-site  Platform used for Video Visit: [facility]    Headache Neurology Progress Note  September 25, 2024      Assessment/Plan:   Leola Uriostegui is a 49 year old woman following up for intermittent neurologic symptoms with previously noted episodes of b/l blurry vision, gait instability, non sensical speech with a work up in the past including EEG and MRI brain without identifiable structural cause and there was no access to her EEG report although was reportedly normal. Her episodes resolved and would rarely occur after starting topiramate, then returned in the setting of stress.     Episodes were associated headaches sometimes, meeting criteria for chronic migraine with aura. Ajovy helped reduce them. She still gets episodes with neurologic symptoms that are triggered by stress, but lately, has had more headaches without neurologic symptoms.    Newer right posterior radiating pain (tingling, needles) shows more like occipital neuralgia than migraine. Recommend further evaluation of cervical spine and possible occipital nerve block through PM&R.  -PM&R referral for cervical spine/ONB    -Recommend she continue Ajovy and zonisamide 100 mg. Tolerating well.  -Trigger of stress continues to be an issue. Interested in trauma therapy. Will set up on her own.  -Trial rizatriptan 10 mg at onset, repeat dose in 2 hours if needed. Up to 9 days per month.  -Add metoclopramide  "and diphenhydramine as needed, up to 9 days per month.    Interested in clinical trials for headache.      The longitudinal plan of care for Leola was addressed during this visit. Due to the added complexity in care, I will continue to support Leola in the subsequent management of this condition(s) and with the ongoing continuity of care of this condition(s).    Kacy Calloway MD  Neurology     Subjective:    Leola Uriostegui returns for follow up of chronic migraine.    She describes more headache with radiation forward from the back of the head on the right side. Moreso since about 2-3 months ago.    Takes Ajovy and zonisamide 100 mg continue. Ajovy is helpful.    Trigger: stress  Trauma in past, interested in course for trauma.    She has had headache daily lately. Feels like hot needles in her eye.    Her chiropractor has adjusting her since 3 weeks ago. Occipital manipulation was helpful, felt like return of blood flow to her head. This has been helpful a few times. No headache since Saturday.    \"Just documenting a new headache woke up with it started into my right temple then kept moving downwards into my ear and jaw and right eye. Ear is ringing. I took all meds Aleve, morphine, and imtex\"     She quit her new job recently. By 10 am, headache returned daily and was severe. Triggers: screens, poor ergonomics.     Imitrex is not consistently helpful.    Objective:    Vitals: There were no vitals taken for this visit.  General: Cooperative, NAD  Neurologic:  Mental Status: Fully alert, attentive and oriented. Speech clear and fluent.   Cranial Nerves: Facial movements symmetric.   Motor: No abnormal movements.      Pertinent Investigations:          1/24/2024     8:43 AM 7/31/2024    12:30 PM 9/25/2024     8:30 AM   HIT-6   When you have headaches, how often is the pain severe 6 10 13   How often do headaches limit your ability to do usual daily activities including household work, work, school, or social " activities? 10 13 13   When you have a headache, how often do you wish you could lie down? 13 13 13   In the past 4 weeks, how often have you felt too tired to do work or daily activities because of your headaches 13 10 13   In the past 4 weeks, how often have you felt fed up or irritated because of your headaches 10 13 13   In the past 4 weeks, how often did headaches limit your ability to concentrate on work or daily activities 10 13 13   HIT-6 Total Score 62 72 78           1/24/2024     8:44 AM 7/31/2024    12:34 PM 9/25/2024     8:32 AM   MIDAS - in the past three months:   On how many days did you miss work or school because of your headaches? 3 5 3   How many days was your productivity at work or school reduced by half or more because of your headaches? 3 5 15   On how many days did you not do household work because of your headaches? 3 10 15   How many days was your productivity in household work reduced by half or more because of your headaches? 3 5 20   On how many days did you miss family, social, or leisure activities because of your headaches? 3 3 5   On how many days did you have a headache? 3 20 60   On a scale of 0-10, on average how painful were these headaches? 0 10 10   MIDAS Score 15 (III - Moderate Disability) 28 (IV - Severe Disability) 58 (IV - Severe Disability)          Again, thank you for allowing me to participate in the care of your patient.      Sincerely,    Kacy Calloway MD

## 2024-10-17 ENCOUNTER — OFFICE VISIT (OUTPATIENT)
Dept: PHYSICAL MEDICINE AND REHAB | Facility: CLINIC | Age: 49
End: 2024-10-17
Attending: PSYCHIATRY & NEUROLOGY
Payer: COMMERCIAL

## 2024-10-17 DIAGNOSIS — M47.812 CERVICAL SPONDYLOSIS WITHOUT MYELOPATHY: Primary | ICD-10-CM

## 2024-10-17 DIAGNOSIS — M54.81 BILATERAL OCCIPITAL NEURALGIA: ICD-10-CM

## 2024-10-17 DIAGNOSIS — G89.29 OTHER CHRONIC PAIN: ICD-10-CM

## 2024-10-17 DIAGNOSIS — M54.81 OCCIPITAL NEURALGIA OF RIGHT SIDE: ICD-10-CM

## 2024-10-17 PROCEDURE — 99203 OFFICE O/P NEW LOW 30 MIN: CPT | Mod: 25 | Performed by: PHYSICAL MEDICINE & REHABILITATION

## 2024-10-17 PROCEDURE — 64450 NJX AA&/STRD OTHER PN/BRANCH: CPT | Mod: 50 | Performed by: PHYSICAL MEDICINE & REHABILITATION

## 2024-10-17 PROCEDURE — 64405 NJX AA&/STRD GR OCPL NRV: CPT | Mod: 50 | Performed by: PHYSICAL MEDICINE & REHABILITATION

## 2024-10-17 RX ORDER — BUPIVACAINE HYDROCHLORIDE 5 MG/ML
2 INJECTION, SOLUTION EPIDURAL; INTRACAUDAL ONCE
Status: COMPLETED | OUTPATIENT
Start: 2024-10-17 | End: 2024-10-17

## 2024-10-17 RX ORDER — BETAMETHASONE SODIUM PHOSPHATE AND BETAMETHASONE ACETATE 3; 3 MG/ML; MG/ML
6 INJECTION, SUSPENSION INTRA-ARTICULAR; INTRALESIONAL; INTRAMUSCULAR; SOFT TISSUE ONCE
Status: COMPLETED | OUTPATIENT
Start: 2024-10-17 | End: 2024-10-21

## 2024-10-17 RX ORDER — BETAMETHASONE SODIUM PHOSPHATE AND BETAMETHASONE ACETATE 3; 3 MG/ML; MG/ML
1 INJECTION, SUSPENSION INTRA-ARTICULAR; INTRALESIONAL; INTRAMUSCULAR; SOFT TISSUE ONCE
Status: CANCELLED | OUTPATIENT
Start: 2024-10-17 | End: 2024-10-17

## 2024-10-17 RX ADMIN — BUPIVACAINE HYDROCHLORIDE 10 MG: 5 INJECTION, SOLUTION EPIDURAL; INTRACAUDAL at 17:22

## 2024-10-17 NOTE — PROGRESS NOTES
CC: I am seeing this patient for consideration of occipital nerve blocks at the request of Dr. Calloway.    Patient is a very pleasant 49-year-old woman with a complex medical history.  She reports being diagnosed with complex migraine.  She has a known history of bipolar disorder.  This was in 2016.  She has gone through a variety of treatment and has had neck surgery from C4-C6 fusion.  She continues to have pain worse on the right side but some on the left as well.  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  She stopped working but she wants to get her life back.    On a scale of 0-10 she rates her pain at its worst in the 10 out of 10 range at best a 0 and on average is 6 out of 10.  She has found ibuprofen gives her some relief.  She has not done physical therapy and has not had MRI recently.  She uses a ketamine and an anti-inflammatory cream periodically.      Past Medical History:   Diagnosis Date    Anemia     Bipolar I disorder (H)     Cervical stenosis of spinal canal     Continuous opioid dependence (H)     DDD (degenerative disc disease), lumbar     Deep vein thrombosis (H) 01/2021    bilateral    Gastroesophageal reflux disease     Hypertension     Irritable bowel syndrome     Itching     Migraine     Mixed hyperlipidemia     PONV (postoperative nausea and vomiting)     Pulmonary embolism (H) 01/2021    2 times, 2nd time 8/2021    Tachycardia     Venous (peripheral) insufficiency      Past Surgical History:   Procedure Laterality Date    ABDOMEN SURGERY      bowel resection x3 for meckels diverticulum    APPENDECTOMY      ARTHROPLASTY KNEE Left 11/15/2022    Procedure: Left total knee arthroplasty;  Surgeon: Valentín Beatty MD;  Location: [facility] OR    BACK SURGERY  2009    cervical fusion    BUNIONECTOMY Left 2010    CHOLECYSTECTOMY      COLONOSCOPY      DISTAL BICEPS TENDON REPAIR Left     ENT SURGERY  1988    T&A    EYE SURGERY Bilateral 2008    strabismus     GENITOURINARY SURGERY      bladder repair    GI SURGERY      EGD X2    HERNIA REPAIR  2019    ventral in 2008, incisional in 2019    HYSTERECTOMY  2003    LAPAROSCOPY      abdominal laparoscopy X15    ORTHOPEDIC SURGERY Left 2010    repair of meniscus    AK ANESTH,DX ARTHROSCOPIC PROC KNEE JOINT Right 2020     Family History       No data available           Social History     Socioeconomic History    Marital status: Single     Spouse name: Not on file    Number of children: Not on file    Years of education: Not on file    Highest education level: Not on file   Occupational History    Not on file   Tobacco Use    Smoking status: Former     Current packs/day: 0.00     Types: Cigarettes     Quit date: 1998     Years since quittin.9    Smokeless tobacco: Never   Vaping Use    Vaping status: Never Used   Substance and Sexual Activity    Alcohol use: Not Currently    Drug use: Yes     Comment: medical cannibus    Sexual activity: Not on file   Other Topics Concern    Not on file   Social History Narrative    Not on file     Social Determinants of Health     Financial Resource Strain: Low Risk  (3/29/2023)    [facility] [employer] [facility]    Financial Resource Strain     Difficulty of Paying Living Expenses: 3     Difficulty of Paying Living Expenses: Not on file   Food Insecurity: No Food Insecurity (2024)    Received from [facility]    Hunger Vital Sign     Worried About Running Out of Food in the Last Year: Never true     Ran Out of Food in the Last Year: Never true   Transportation Needs: No Transportation Needs (2024)    Received from [facility]    PRAPARE - Transportation     Lack of Transportation (Medical): No     Lack of Transportation (Non-Medical): No   Physical Activity: Sufficiently Active (2024)    Received from [facility]    Exercise Vital Sign     Days of Exercise per  Week: 5 days     Minutes of Exercise per Session: 30 min   Stress: Not on file   Social Connections: Unknown (3/29/2024)    Received from [facility]    Social Connections     Frequency of Communication with Friends and Family: Not on file   Interpersonal Safety: Not on file   Housing Stability: Low Risk  (2/1/2024)    [facility]    Housing Stability     What is your living situation today?: I have a steady place to live     Current Outpatient Medications   Medication Sig Dispense Refill    acetaminophen (TYLENOL) 325 MG tablet Take 2 tablets (650 mg) by mouth every 4 hours as needed for other (mild pain) 100 tablet 0    apixaban ANTICOAGULANT (ELIQUIS) 5 MG tablet Take 1 tablet (5 mg) by mouth 2 times daily Restart after you have completed your enoxaparin injections in ~10 days as per your orthopedic surgeon.      atomoxetine (STRATTERA) 80 MG capsule Take 80 mg by mouth daily      busPIRone (BUSPAR) 15 MG tablet Take 15 mg by mouth 3 times daily       cyclobenzaprine (FLEXERIL) 10 MG tablet Take 10 mg by mouth 3 times daily as needed      dicyclomine (BENTYL) 10 MG capsule Take 10 mg by mouth 2 times daily as needed      Fremanezumab-vfrm (AJOVY) 225 MG/1.5ML SOAJ Inject 225 mg subcutaneously every 30 days 1.5 mL 11    hydrOXYzine (ATARAX) 25 MG tablet Take 1 tablet (25 mg) by mouth every 6 hours as needed for itching or anxiety (with pain, moderate pain) 30 tablet 0    hydrOXYzine (VISTARIL) 25 MG capsule Take 25 mg by mouth as needed      levothyroxine (SYNTHROID/LEVOTHROID) 75 MCG tablet take one tablet by mouth every day before breakfast      lidocaine (LIDODERM) 5 % Patch Apply 1 patch topically daily as needed      linaclotide (LINZESS) 145 MCG capsule Take 145 mcg by mouth daily      liothyronine (CYTOMEL) 5 MCG tablet Take 5 mcg by mouth 2 times daily      methocarbamol (ROBAXIN) 500 MG tablet Take 500 mg by mouth 3 times daily as needed       metoclopramide (REGLAN) 10 MG tablet Take 1 tablet (10 mg) by mouth 3 times daily as needed (nausea, vomiting) 10 tablet 11    metoprolol (LOPRESSOR) 25 MG tablet Take 25 mg by mouth 2 times daily      mirtazapine (REMERON) 15 MG tablet Take 7.5 mg by mouth At Bedtime      morphine (MS CONTIN) 15 MG CR tablet Take 15 mg by mouth every 12 hours      Multiple Vitamins-Minerals (MULTIVITAMIN ADULT PO) Take 1 tablet by mouth daily      NONFORMULARY 2.5 mg 2 times daily Medical Cannibus      omeprazole (PRILOSEC) 20 MG DR capsule Take 20 mg by mouth daily      pantoprazole (PROTONIX) 40 MG EC tablet Take 40 mg by mouth daily as needed      pravastatin (PRAVACHOL) 40 MG tablet Take 40 mg by mouth At Bedtime      progesterone (PROMETRIUM) 200 MG capsule Take 200 mg by mouth At Bedtime      QUEtiapine (SEROQUEL) 200 MG tablet Take 200 mg by mouth nightly as needed      QUEtiapine (SEROQUEL) 25 MG tablet Take 25 mg by mouth as needed      rizatriptan (MAXALT) 10 MG tablet Take 1 tablet (10 mg) by mouth at onset of headache. May repeat in 2 hours. Max 3 tablets/24 hours. 18 tablet 11    senna-docusate (SENOKOT-S/PERICOLACE) 8.6-50 MG tablet Take 1-2 tablets by mouth 2 times daily Take while on oral narcotics to prevent or treat constipation. 30 tablet 0    spironolactone (ALDACTONE) 100 MG tablet Take 100 mg by mouth daily      thyroid (ARMOUR) 15 MG tablet Take 15 mg by mouth      thyroid (ARMOUR) 60 MG tablet Take 1 tablet by mouth daily      thyroid (ARMOUR) 60 MG tablet Take 60 mg by mouth daily      traZODone (DESYREL) 50 MG tablet Take 100 mg by mouth daily      triamcinolone (KENALOG) 0.1 % external cream       zonisamide (ZONEGRAN) 100 MG capsule Take 1 capsule (100 mg) by mouth daily 90 capsule 3     There were no vitals taken for this visit.    On examination, patient is alert and cooperative.  Vitals are stable.  She is afebrile.  General physical examination is unremarkable.  HEENT is negative.  Extraocular  movements are intact.  Face is symmetric.  Tongue is midline.  Neck is supple.  She is able to move her upper extremities functionally.  She is able to move her lower extremities functionally.  She is able to stand.  Romberg is negative.  Gait is unremarkable.    Musculoskeletal examination revealed tenderness over the greater and lesser occipital nerves on either side.  She was tender over the cervical facets.  Remainder of the areas were nontender.    Impression: At this point this 49-year-old woman has been dealing with chronic headaches neck pain and shoulder pain.  Currently appears to have features of occipital neuralgia bilaterally.  Will go with nerve blocks for the greater and lesser occipital nerves on either side.  I would like to see her back in about 4 weeks to see if her cervical facets are the contributors to her pain.  She has a fusion anteriorly from C4-C6.  Her C2-3 and C3-4 may be affected still.    30 minutes for the evaluation management portion of the visit, exclusive of the procedure time, greater than 50% was for counseling above-mentioned issues.    Procedure note: With her informed consent, after explaining the benefits and risks of the procedure, using ChloraPrep for skin prep, 1 cc of Celestone with 2 cc of 0.5% Marcaine approximately 0.75 cc were utilized to inject each of the 4 nerves on either side, the greater occipital nerve and the lesser occipital nerve, using a 25-gauge 1-1/2 inch needle.  She tolerated the procedure well and experienced good relief at the injection sites and felt better overall.    She can ice the region as needed and update this clinic as appropriate and follow-up in 4 weeks time to reassess the neck.    Thank very much for allowing me to assist in her care.  All questions were answered to her satisfaction.    Cameron Meza MD

## 2024-10-17 NOTE — LETTER
10/17/2024       RE: Leola Uriostegui  [address]  [place] [address]     Dear Colleague,    Thank you for referring your patient, Leola Uriostegui, to the [facility] PHYSICAL MEDICINE AND REHABILITATION CLINIC [place] at [facility]. Please see a copy of my visit note below.    CC: I am seeing this patient for consideration of occipital nerve blocks at the request of Dr. Calloway.    Patient is a very pleasant 49-year-old woman with a complex medical history.  She reports being diagnosed with complex migraine.  She has a known history of bipolar disorder.  This was in 2016.  She has gone through a variety of treatment and has had neck surgery from C4-C6 fusion.  She continues to have pain worse on the right side but some on the left as well.  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  She stopped working but she wants to get her life back.    On a scale of 0-10 she rates her pain at its worst in the 10 out of 10 range at best a 0 and on average is 6 out of 10.  She has found ibuprofen gives her some relief.  She has not done physical therapy and has not had MRI recently.  She uses a ketamine and an anti-inflammatory cream periodically.      Past Medical History:   Diagnosis Date     Anemia      Bipolar I disorder (H)      Cervical stenosis of spinal canal      Continuous opioid dependence (H)      DDD (degenerative disc disease), lumbar      Deep vein thrombosis (H) 01/2021    bilateral     Gastroesophageal reflux disease      Hypertension      Irritable bowel syndrome      Itching      Migraine      Mixed hyperlipidemia      PONV (postoperative nausea and vomiting)      Pulmonary embolism (H) 01/2021    2 times, 2nd time 8/2021     Tachycardia      Venous (peripheral) insufficiency      Past Surgical History:   Procedure Laterality Date     ABDOMEN SURGERY      bowel resection x3 for meckels diverticulum      APPENDECTOMY       ARTHROPLASTY KNEE Left 11/15/2022    Procedure: Left total knee arthroplasty;  Surgeon: Valentín Beatty MD;  Location: [facility] OR     BACK SURGERY  2009    cervical fusion     BUNIONECTOMY Left 2010     CHOLECYSTECTOMY       COLONOSCOPY       DISTAL BICEPS TENDON REPAIR Left      ENT SURGERY  1988    T&A     EYE SURGERY Bilateral 2008    strabismus     GENITOURINARY SURGERY      bladder repair     GI SURGERY      EGD X2     HERNIA REPAIR  2019    ventral in , incisional in 2019     HYSTERECTOMY  2003     LAPAROSCOPY      abdominal laparoscopy X15     ORTHOPEDIC SURGERY Left 2010    repair of meniscus     TX ANESTH,DX ARTHROSCOPIC PROC KNEE JOINT Right      Family History       No data available           Social History     Socioeconomic History     Marital status: Single     Spouse name: Not on file     Number of children: Not on file     Years of education: Not on file     Highest education level: Not on file   Occupational History     Not on file   Tobacco Use     Smoking status: Former     Current packs/day: 0.00     Types: Cigarettes     Quit date: 1998     Years since quittin.9     Smokeless tobacco: Never   Vaping Use     Vaping status: Never Used   Substance and Sexual Activity     Alcohol use: Not Currently     Drug use: Yes     Comment: medical cannibus     Sexual activity: Not on file   Other Topics Concern     Not on file   Social History Narrative     Not on file     Social Determinants of Health     Financial Resource Strain: Low Risk  (3/29/2023)    Received from [employer] [facility] [employer] [facility], [employer] [facility]    Financial Resource Strain      Difficulty of Paying Living Expenses: 3      Difficulty of Paying Living Expenses: Not on file   Food Insecurity: No Food Insecurity (2024)    Received from [facility], [facility]    Hunger Vital Sign      Worried About Running Out of Food in the Last Year: Never true      Ran  Out of Food in the Last Year: Never true   Transportation Needs: No Transportation Needs (2/1/2024)    Received from [facility]    PRAPARE - Transportation      Lack of Transportation (Medical): No      Lack of Transportation (Non-Medical): No   Physical Activity: Sufficiently Active (2/1/2024)    Received from [facility]    Exercise Vital Sign      Days of Exercise per Week: 5 days      Minutes of Exercise per Session: 30 min   Stress: Not on file   Social Connections: Unknown (3/29/2024)    Received from [employer] [facility]    Social Connections      Frequency of Communication with Friends and Family: Not on file   Interpersonal Safety: Not on file   Housing Stability: Low Risk  (2/1/2024)    Received from [facility]    Housing Stability      What is your living situation today?: I have a steady place to live     Current Outpatient Medications   Medication Sig Dispense Refill     acetaminophen (TYLENOL) 325 MG tablet Take 2 tablets (650 mg) by mouth every 4 hours as needed for other (mild pain) 100 tablet 0     apixaban ANTICOAGULANT (ELIQUIS) 5 MG tablet Take 1 tablet (5 mg) by mouth 2 times daily Restart after you have completed your enoxaparin injections in ~10 days as per your orthopedic surgeon.       atomoxetine (STRATTERA) 80 MG capsule Take 80 mg by mouth daily       busPIRone (BUSPAR) 15 MG tablet Take 15 mg by mouth 3 times daily        cyclobenzaprine (FLEXERIL) 10 MG tablet Take 10 mg by mouth 3 times daily as needed       dicyclomine (BENTYL) 10 MG capsule Take 10 mg by mouth 2 times daily as needed       Fremanezumab-vfrm (AJOVY) 225 MG/1.5ML SOAJ Inject 225 mg subcutaneously every 30 days 1.5 mL 11     hydrOXYzine (ATARAX) 25 MG tablet Take 1 tablet (25 mg) by mouth every 6 hours as needed for itching or anxiety (with pain, moderate pain) 30 tablet 0     hydrOXYzine (VISTARIL) 25 MG capsule Take 25 mg by mouth as needed        levothyroxine (SYNTHROID/LEVOTHROID) 75 MCG tablet take one tablet by mouth every day before breakfast       lidocaine (LIDODERM) 5 % Patch Apply 1 patch topically daily as needed       linaclotide (LINZESS) 145 MCG capsule Take 145 mcg by mouth daily       liothyronine (CYTOMEL) 5 MCG tablet Take 5 mcg by mouth 2 times daily       methocarbamol (ROBAXIN) 500 MG tablet Take 500 mg by mouth 3 times daily as needed       metoclopramide (REGLAN) 10 MG tablet Take 1 tablet (10 mg) by mouth 3 times daily as needed (nausea, vomiting) 10 tablet 11     metoprolol (LOPRESSOR) 25 MG tablet Take 25 mg by mouth 2 times daily       mirtazapine (REMERON) 15 MG tablet Take 7.5 mg by mouth At Bedtime       morphine (MS CONTIN) 15 MG CR tablet Take 15 mg by mouth every 12 hours       Multiple Vitamins-Minerals (MULTIVITAMIN ADULT PO) Take 1 tablet by mouth daily       NONFORMULARY 2.5 mg 2 times daily Medical Cannibus       omeprazole (PRILOSEC) 20 MG DR capsule Take 20 mg by mouth daily       pantoprazole (PROTONIX) 40 MG EC tablet Take 40 mg by mouth daily as needed       pravastatin (PRAVACHOL) 40 MG tablet Take 40 mg by mouth At Bedtime       progesterone (PROMETRIUM) 200 MG capsule Take 200 mg by mouth At Bedtime       QUEtiapine (SEROQUEL) 200 MG tablet Take 200 mg by mouth nightly as needed       QUEtiapine (SEROQUEL) 25 MG tablet Take 25 mg by mouth as needed       rizatriptan (MAXALT) 10 MG tablet Take 1 tablet (10 mg) by mouth at onset of headache. May repeat in 2 hours. Max 3 tablets/24 hours. 18 tablet 11     senna-docusate (SENOKOT-S/PERICOLACE) 8.6-50 MG tablet Take 1-2 tablets by mouth 2 times daily Take while on oral narcotics to prevent or treat constipation. 30 tablet 0     spironolactone (ALDACTONE) 100 MG tablet Take 100 mg by mouth daily       thyroid (ARMOUR) 15 MG tablet Take 15 mg by mouth       thyroid (ARMOUR) 60 MG tablet Take 1 tablet by mouth daily       thyroid (ARMOUR) 60 MG tablet Take 60 mg by mouth  daily       traZODone (DESYREL) 50 MG tablet Take 100 mg by mouth daily       triamcinolone (KENALOG) 0.1 % external cream        zonisamide (ZONEGRAN) 100 MG capsule Take 1 capsule (100 mg) by mouth daily 90 capsule 3     There were no vitals taken for this visit.    On examination, patient is alert and cooperative.  Vitals are stable.  She is afebrile.  General physical examination is unremarkable.  HEENT is negative.  Extraocular movements are intact.  Face is symmetric.  Tongue is midline.  Neck is supple.  She is able to move her upper extremities functionally.  She is able to move her lower extremities functionally.  She is able to stand.  Romberg is negative.  Gait is unremarkable.    Musculoskeletal examination revealed tenderness over the greater and lesser occipital nerves on either side.  She was tender over the cervical facets.  Remainder of the areas were nontender.    Impression: At this point this 49-year-old woman has been dealing with chronic headaches neck pain and shoulder pain.  Currently appears to have features of occipital neuralgia bilaterally.  Will go with nerve blocks for the greater and lesser occipital nerves on either side.  I would like to see her back in about 4 weeks to see if her cervical facets are the contributors to her pain.  She has a fusion anteriorly from C4-C6.  Her C2-3 and C3-4 may be affected still.    30 minutes for the evaluation management portion of the visit, exclusive of the procedure time, greater than 50% was for counseling above-mentioned issues.    Procedure note: With her informed consent, after explaining the benefits and risks of the procedure, using ChloraPrep for skin prep, 1 cc of Celestone with 2 cc of 0.5% Marcaine approximately 0.75 cc were utilized to inject each of the 4 nerves on either side, the greater occipital nerve and the lesser occipital nerve, using a 25-gauge 1-1/2 inch needle.  She tolerated the procedure well and experienced good relief at  the injection sites and felt better overall.    She can ice the region as needed and update this clinic as appropriate and follow-up in 4 weeks time to reassess the neck.    Thank very much for allowing me to assist in her care.  All questions were answered to her satisfaction.    Cameron Meza MD       Again, thank you for allowing me to participate in the care of your patient.      Sincerely,    Cameron Meza MD

## 2024-10-17 NOTE — NURSING NOTE
Chief Complaint   Patient presents with    New Patient     Here for consult, confirmed with patient     Lindsay Ulloa

## 2024-10-21 RX ADMIN — BETAMETHASONE SODIUM PHOSPHATE AND BETAMETHASONE ACETATE 6 MG: 3; 3 INJECTION, SUSPENSION INTRA-ARTICULAR; INTRALESIONAL; INTRAMUSCULAR; SOFT TISSUE at 17:21

## 2025-01-29 ENCOUNTER — TELEPHONE (OUTPATIENT)
Dept: NEUROLOGY | Facility: CLINIC | Age: 50
End: 2025-01-29
Payer: COMMERCIAL

## 2025-01-29 NOTE — TELEPHONE ENCOUNTER
"Situation:   -Beginning on Sunday, 1/26, patient was experiencing halos while driving/seeing \"plus signs\", denied any other symptoms or migraine. Vision issues have resolved since.  - Monday, 1/27 - she woke up and her skull was tender to touch. No swelling/redness/discoloration reported. She denies falling or hitting head. She states this is a new symptom. She has numbness on the right side of her head, above her ear/posterior head. This symptom is not new.  - Today, woke up with a headache/migraine. Took Rizatriptan + Aleve + Reglan, which headache resolved, but continues with tender skull and localized numbness.     Assessment:   Fever: No  Nausea or vomiting: No  Dizziness: No  Numbness: Yes, noted above. Numbness is not new, she has had this symptom prior and resolved on its own. No other new numbness, it is located.   Weakness: No  Visual changes: No  Speech: Fluent, no word-finding difficulty  Denies any other new symptoms.      Plan:    - Advised to take another Rizatriptan to see if other symptoms resolve.    - EEG/MRI completed in the past due to previously having numbess/halos.   - Reassuring that symptoms are not new or worsening.   - Routing to Dr. Calloway if there are further recommendations, follow-up scheduled in Feb    "

## 2025-01-29 NOTE — TELEPHONE ENCOUNTER
Caller reporting the following red-flag symptom(s): Pt states starting yesterday that she started experiancing pain in the back and front of her skull as well as is experiancing some skull numbness and tenderness. Pt states that she did not fall or hit her head. Pt states her eyes are ok. no vision issues. Pt states she is also experiencing some word recognition issues and is having burst of extreme fatigue where she immediatly needs to lay down and sleep. Pt stated that she does not believe this is related to her on going migraine issues as these symptoms are new.    Pt did not want to speak with Red flag nurse, only to Karmen Walters nurse. Please contact Pt at [phone]      Per the system red-flag symptom policy, patient was instructed to:  speak with a Registered Nurse    Action:  Refuses to speak with a Registered Nurse and wants a message sent to their provider

## 2025-04-25 ENCOUNTER — MYC MEDICAL ADVICE (OUTPATIENT)
Dept: NEUROLOGY | Facility: CLINIC | Age: 50
End: 2025-04-25
Payer: COMMERCIAL

## 2025-04-28 NOTE — TELEPHONE ENCOUNTER
Called to see what email is appropriate for letter to be sent to. [email or web address]    Confirmed appt for 6/11.    Patient provided update:  Went to [place] and completed SPECT scan, then completed Hyperbaric treatment. 2 hours a day everyday for 80 treatments. She is trying to figure out where to get care for this. [facility] declining to schedule her for these treatments.     Diagnosed with anxiety/depression/PTSD. Brain injury from ~ 15 years ago. Started on new meds. She has reported significant decrease in migraines. She reports clear mind and much improvement.

## 2025-06-11 ENCOUNTER — VIRTUAL VISIT (OUTPATIENT)
Dept: NEUROLOGY | Facility: CLINIC | Age: 50
End: 2025-06-11
Payer: COMMERCIAL

## 2025-06-11 DIAGNOSIS — G43.709 CHRONIC MIGRAINE WITHOUT AURA WITHOUT STATUS MIGRAINOSUS, NOT INTRACTABLE: ICD-10-CM

## 2025-06-11 RX ORDER — ZONISAMIDE 100 MG/1
100 CAPSULE ORAL DAILY
Qty: 90 CAPSULE | Refills: 3 | Status: SHIPPED | OUTPATIENT
Start: 2025-06-11

## 2025-06-11 RX ORDER — FREMANEZUMAB-VFRM 225 MG/1.5ML
1.5 INJECTION SUBCUTANEOUS
Qty: 1.5 ML | Refills: 11 | Status: SHIPPED | OUTPATIENT
Start: 2025-06-11

## 2025-06-11 RX ORDER — RIZATRIPTAN BENZOATE 10 MG/1
10 TABLET ORAL
Qty: 18 TABLET | Refills: 11 | Status: SHIPPED | OUTPATIENT
Start: 2025-06-11

## 2025-06-11 ASSESSMENT — MIGRAINE DISABILITY ASSESSMENT (MIDAS)
HOW OFTEN WERE SOCIAL ACTIVITIES MISSED DUE TO HEADACHES: 1
HOW MANY DAYS DID YOU MISS WORK OR SCHOOL BECAUSE OF HEADACHES: 0
HOW MANY DAYS DID YOU NOT DO HOUSEWORK BECAUSE OF HEADACHES: 3
HOW MANY DAYS WAS HOUSEWORK PRODUCTIVITY CUT IN HALF DUE TO HEADACHES: 3
HOW MANY DAYS IN THE PAST 3 MONTHS HAVE YOU HAD A HEADACHE: 3
HOW MANY DAYS WAS YOUR PRODUCTIVITY CUT IN HALF BECAUSE OF HEADACHES: 0
TOTAL SCORE: 7
ON A SCALE FROM 0-10 ON AVERAGE HOW PAINFUL WERE HEADACHES: 10

## 2025-06-11 ASSESSMENT — HEADACHE IMPACT TEST (HIT 6)
HIT6 TOTAL SCORE: 59
HOW OFTEN HAVE YOU FELT FED UP OR IRRITATED BECAUSE OF YOUR HEADACHES: RARELY
HOW OFTEN DO HEADACHES LIMIT YOUR DAILY ACTIVITIES: VERY OFTEN
WHEN YOU HAVE A HEADACHE HOW OFTEN DO YOU WISH YOU COULD LIE DOWN: ALWAYS
WHEN YOU HAVE HEADACHES HOW OFTEN IS THE PAIN SEVERE: VERY OFTEN
HOW OFTEN HAVE YOU FELT FED UP OR IRRITATED BECAUSE OF YOUR HEADACHES: RARELY
HOW OFTEN DO HEADACHES LIMIT YOUR DAILY ACTIVITIES: VERY OFTEN
WHEN YOU HAVE HEADACHES HOW OFTEN IS THE PAIN SEVERE: VERY OFTEN
HOW OFTEN DID HEADACHS LIMIT CONCENTRATION ON WORK OR DAILY ACTIVITY: RARELY
WHEN YOU HAVE A HEADACHE HOW OFTEN DO YOU WISH YOU COULD LIE DOWN: ALWAYS
HOW OFTEN HAVE YOU FELT TOO TIRED TO WORK BECAUSE OF YOUR HEADACHES: RARELY
HOW OFTEN DID HEADACHS LIMIT CONCENTRATION ON WORK OR DAILY ACTIVITY: RARELY
HOW OFTEN HAVE YOU FELT TOO TIRED TO WORK BECAUSE OF YOUR HEADACHES: RARELY
HIT6 TOTAL SCORE: 59

## 2025-06-11 NOTE — LETTER
6/11/2025       RE: Leola Uriostegui  [address]  [place] [address]     Dear Colleague,    Thank you for referring your patient, Leola Uriostegui, to the [facility] NEUROLOGY CLINIC [place] at . Please see a copy of my visit note below.    [facility]    Headache Neurology Progress Note  June 11, 2025      Assessment/Plan:   Leola Uriostegui is a 50 year old woman following up for intermittent neurologic symptoms with previously noted episodes of b/l blurry vision, gait instability, non sensical speech with a work up in the past including EEG and MRI brain without identifiable structural cause and her EEG report although was reportedly normal.      Episodes were associated headaches sometimes, meeting criteria for chronic migraine with aura. Ajovy and zonisamide helped reduce them. Has been doing well lately headache-wise, which she attributes to hyperbaric oxygen treatments and Cymbalta and saffron.     -Recommend she continue Ajovy and zonisamide 100 mg. Tolerating well.  -Trial rizatriptan 10 mg at onset, repeat dose in 2 hours if needed. Up to 9 days per month.     The longitudinal plan of care for Leola was addressed during this visit. Due to the added complexity in care, I will continue to support Leola in the subsequent management of this condition(s) and with the ongoing continuity of care of this condition(s). I will see her back in 1 year. If still stable, will consider taper down/off of zonisamide and Ajovy.    Kacy Calloway MD  Neurology     Subjective:    Leola Uriostegui returns for follow up of chronic migraine.     She describes more headache with radiation forward from the back of the head on the right side. Moreso since about 2-3 months ago.     Takes Ajovy and zonisamide 100 mg continue. Ajovy is helpful.    She was seen at the [facility] out [place], [place]. She says brain damage was  found. A scan showed a dark hole in her brain over the area of a previous injury as a child.     She is taking Cymbalta and saffron, which are helpful.    She had a tooth infection, and went to the ER for this. She went crazy with this, she explains.     Concerned she had sepsis.   Reports foot swelling and blood clot suspected - started Lovenox, US next day without clot.    She is also doing hyperbaric oxygen chamber treatments as well. Doing to try to heal brain damage. Headache also improved. Planning to get at home HBO.    Describes inflammatory anemia with bruises all over; she feels weak and pale.    Had only 1 migraine attack in the last 6 months. Took rizatriptan, which is helpful. Has metoclopramide available.    Not yet working again.     She stopped Ajovy, now restarted. Still taking zonisamide 100 mg.        Objective:    Vitals: There were no vitals taken for this visit.  General: Cooperative, NAD  Neurologic:    Pertinent Investigations:          7/31/2024    12:30 PM 9/25/2024     8:30 AM 6/11/2025    12:31 PM   HIT-6   When you have headaches, how often is the pain severe 10  13 11    How often do headaches limit your ability to do usual daily activities including household work, work, school, or social activities? 13  13 11    When you have a headache, how often do you wish you could lie down? 13  13 13    In the past 4 weeks, how often have you felt too tired to do work or daily activities because of your headaches 10  13 8    In the past 4 weeks, how often have you felt fed up or irritated because of your headaches 13  13 8    In the past 4 weeks, how often did headaches limit your ability to concentrate on work or daily activities 13  13 8    HIT-6 Total Score 72 78 59        Proxy-reported           7/31/2024    12:34 PM 9/25/2024     8:32 AM 6/11/2025    12:31 PM   MIDAS - in the past three months:   On how many days did you miss work or school because of your headaches? 5 3 0   How many days  was your productivity at work or school reduced by half or more because of your headaches? 5 15 0   On how many days did you not do household work because of your headaches? 10 15 3   How many days was your productivity in household work reduced by half or more because of your headaches? 5 20 3   On how many days did you miss family, social, or leisure activities because of your headaches? 3 5 1   On how many days did you have a headache? 20 60 3   On a scale of 0-10, on average how painful were these headaches? 10 10 10   MIDAS Score 28 (IV - Severe Disability) 58 (IV - Severe Disability) 7 (II - Mild Disability)          Virtual Visit Details    Type of service:  Video Visit   Video Start Time: 12:45 PM  Video End Time:12:56 PM    Originating Location (pt. Location): Home    Distant Location (provider location):  Off-site  Platform used for Video Visit: [employer], switched to [employer] due to technical difficulties      Again, thank you for allowing me to participate in the care of your patient.      Sincerely,    Kacy Calloway MD

## 2025-06-11 NOTE — PROGRESS NOTES
[facility]    Headache Neurology Progress Note  June 11, 2025      Assessment/Plan:   Leola Uriostegui is a 50 year old woman following up for intermittent neurologic symptoms with previously noted episodes of b/l blurry vision, gait instability, non sensical speech with a work up in the past including EEG and MRI brain without identifiable structural cause and her EEG report although was reportedly normal.      Episodes were associated headaches sometimes, meeting criteria for chronic migraine with aura. Ajovy and zonisamide helped reduce them. Has been doing well lately headache-wise, which she attributes to hyperbaric oxygen treatments and Cymbalta and saffron.     -Recommend she continue Ajovy and zonisamide 100 mg. Tolerating well.  -Trial rizatriptan 10 mg at onset, repeat dose in 2 hours if needed. Up to 9 days per month.     The longitudinal plan of care for Leola was addressed during this visit. Due to the added complexity in care, I will continue to support Leola in the subsequent management of this condition(s) and with the ongoing continuity of care of this condition(s). I will see her back in 1 year. If still stable, will consider taper down/off of zonisamide and Ajovy.    Kacy Calloway MD  Neurology     Subjective:    Leola Uriostegui returns for follow up of chronic migraine.     She describes more headache with radiation forward from the back of the head on the right side. Moreso since about 2-3 months ago.     Takes Ajovy and zonisamide 100 mg continue. Ajovy is helpful.    She was seen at the [facility] out [place], [place]. She says brain damage was found. A scan showed a dark hole in her brain over the area of a previous injury as a child.     She is taking Cymbalta and saffron, which are helpful.    She had a tooth infection, and went to the ER for this. She went crazy with this, she explains.     Concerned she had sepsis.   Reports foot swelling and blood clot  suspected - started Lovenox, US next day without clot.    She is also doing hyperbaric oxygen chamber treatments as well. Doing to try to heal brain damage. Headache also improved. Planning to get at home HBO.    Describes inflammatory anemia with bruises all over; she feels weak and pale.    Had only 1 migraine attack in the last 6 months. Took rizatriptan, which is helpful. Has metoclopramide available.    Not yet working again.     She stopped Ajovy, now restarted. Still taking zonisamide 100 mg.        Objective:    Vitals: There were no vitals taken for this visit.  General: Cooperative, NAD  Neurologic:    Pertinent Investigations:          7/31/2024    12:30 PM 9/25/2024     8:30 AM 6/11/2025    12:31 PM   HIT-6   When you have headaches, how often is the pain severe 10  13 11    How often do headaches limit your ability to do usual daily activities including household work, work, school, or social activities? 13  13 11    When you have a headache, how often do you wish you could lie down? 13  13 13    In the past 4 weeks, how often have you felt too tired to do work or daily activities because of your headaches 10  13 8    In the past 4 weeks, how often have you felt fed up or irritated because of your headaches 13  13 8    In the past 4 weeks, how often did headaches limit your ability to concentrate on work or daily activities 13  13 8    HIT-6 Total Score 72 78 59        Proxy-reported           7/31/2024    12:34 PM 9/25/2024     8:32 AM 6/11/2025    12:31 PM   MIDAS - in the past three months:   On how many days did you miss work or school because of your headaches? 5 3 0   How many days was your productivity at work or school reduced by half or more because of your headaches? 5 15 0   On how many days did you not do household work because of your headaches? 10 15 3   How many days was your productivity in household work reduced by half or more because of your headaches? 5 20 3   On how many days did  you miss family, social, or leisure activities because of your headaches? 3 5 1   On how many days did you have a headache? 20 60 3   On a scale of 0-10, on average how painful were these headaches? 10 10 10   MIDAS Score 28 (IV - Severe Disability) 58 (IV - Severe Disability) 7 (II - Mild Disability)

## 2025-06-11 NOTE — PROGRESS NOTES
Virtual Visit Details    Type of service:  Video Visit   Video Start Time: 12:45 PM  Video End Time:12:56 PM    Originating Location (pt. Location): Home    Distant Location (provider location):  Off-site  Platform used for Video Visit: [employer], switched to [employer] due to technical difficulties

## 2025-06-15 ENCOUNTER — HEALTH MAINTENANCE LETTER (OUTPATIENT)
Age: 50
End: 2025-06-15

## 2025-08-24 ENCOUNTER — HOSPITAL ENCOUNTER (EMERGENCY)
Facility: CLINIC | Age: 50
Discharge: HOME | End: 2025-08-24
Attending: EMERGENCY MEDICINE | Admitting: EMERGENCY MEDICINE
Payer: COMMERCIAL

## 2025-08-24 VITALS
HEIGHT: 62 IN | BODY MASS INDEX: 27.6 KG/M2 | SYSTOLIC BLOOD PRESSURE: 109 MMHG | WEIGHT: 150 LBS | DIASTOLIC BLOOD PRESSURE: 70 MMHG | RESPIRATION RATE: 18 BRPM | OXYGEN SATURATION: 99 % | TEMPERATURE: 98.5 F | HEART RATE: 80 BPM

## 2025-08-24 DIAGNOSIS — E86.0 MILD DEHYDRATION: ICD-10-CM

## 2025-08-24 DIAGNOSIS — K08.89 PAIN, DENTAL: Primary | ICD-10-CM

## 2025-08-24 LAB
ANION GAP SERPL CALCULATED.3IONS-SCNC: 10 MMOL/L (ref 7–15)
BASOPHILS # BLD AUTO: 0.05 10E3/UL (ref 0–0.2)
BASOPHILS NFR BLD AUTO: 0.5 %
BUN SERPL-MCNC: 30.3 MG/DL (ref 6–20)
CALCIUM SERPL-MCNC: 8.7 MG/DL (ref 8.8–10.4)
CHLORIDE SERPL-SCNC: 101 MMOL/L (ref 98–107)
CREAT SERPL-MCNC: 0.9 MG/DL (ref 0.51–0.95)
EGFRCR SERPLBLD CKD-EPI 2021: 78 ML/MIN/1.73M2
EOSINOPHIL # BLD AUTO: 0.26 10E3/UL (ref 0–0.7)
EOSINOPHIL NFR BLD AUTO: 2.8 %
ERYTHROCYTE [DISTWIDTH] IN BLOOD BY AUTOMATED COUNT: 14.6 % (ref 10–15)
GLUCOSE BLDC GLUCOMTR-MCNC: 72 MG/DL (ref 70–99)
GLUCOSE SERPL-MCNC: 57 MG/DL (ref 70–99)
HCG SERPL QL: NEGATIVE
HCO3 SERPL-SCNC: 25 MMOL/L (ref 22–29)
HCT VFR BLD AUTO: 37.3 % (ref 35–47)
HGB BLD-MCNC: 12.3 G/DL (ref 11.7–15.7)
IMM GRANULOCYTES # BLD: 0.03 10E3/UL
IMM GRANULOCYTES NFR BLD: 0.3 %
LYMPHOCYTES # BLD AUTO: 1.78 10E3/UL (ref 0.8–5.3)
LYMPHOCYTES NFR BLD AUTO: 19.3 %
MCH RBC QN AUTO: 31.9 PG (ref 26.5–33)
MCHC RBC AUTO-ENTMCNC: 33 G/DL (ref 31.5–36.5)
MCV RBC AUTO: 96.6 FL (ref 78–100)
MONOCYTES # BLD AUTO: 0.65 10E3/UL (ref 0–1.3)
MONOCYTES NFR BLD AUTO: 7 %
NEUTROPHILS # BLD AUTO: 6.46 10E3/UL (ref 1.6–8.3)
NEUTROPHILS NFR BLD AUTO: 70.1 %
NRBC # BLD AUTO: <0.03 10E3/UL
NRBC BLD AUTO-RTO: 0 /100
PLATELET # BLD AUTO: 323 10E3/UL (ref 150–450)
POTASSIUM SERPL-SCNC: 4.6 MMOL/L (ref 3.4–5.3)
RBC # BLD AUTO: 3.86 10E6/UL (ref 3.8–5.2)
SODIUM SERPL-SCNC: 136 MMOL/L (ref 135–145)
WBC # BLD AUTO: 9.23 10E3/UL (ref 4–11)

## 2025-08-24 PROCEDURE — 82962 GLUCOSE BLOOD TEST: CPT

## 2025-08-24 PROCEDURE — 96360 HYDRATION IV INFUSION INIT: CPT | Performed by: EMERGENCY MEDICINE

## 2025-08-24 PROCEDURE — 85004 AUTOMATED DIFF WBC COUNT: CPT | Performed by: EMERGENCY MEDICINE

## 2025-08-24 PROCEDURE — 84703 CHORIONIC GONADOTROPIN ASSAY: CPT | Performed by: EMERGENCY MEDICINE

## 2025-08-24 PROCEDURE — 36415 COLL VENOUS BLD VENIPUNCTURE: CPT | Performed by: EMERGENCY MEDICINE

## 2025-08-24 PROCEDURE — 80048 BASIC METABOLIC PNL TOTAL CA: CPT | Performed by: EMERGENCY MEDICINE

## 2025-08-24 PROCEDURE — 99291 CRITICAL CARE FIRST HOUR: CPT | Performed by: EMERGENCY MEDICINE

## 2025-08-24 PROCEDURE — 258N000003 HC RX IP 258 OP 636: Performed by: EMERGENCY MEDICINE

## 2025-08-24 PROCEDURE — 250N000013 HC RX MED GY IP 250 OP 250 PS 637: Performed by: EMERGENCY MEDICINE

## 2025-08-24 RX ORDER — HYDROCODONE BITARTRATE AND ACETAMINOPHEN 5; 325 MG/1; MG/1
2 TABLET ORAL ONCE
Refills: 0 | Status: COMPLETED | OUTPATIENT
Start: 2025-08-24 | End: 2025-08-24

## 2025-08-24 RX ADMIN — SODIUM CHLORIDE, SODIUM LACTATE, POTASSIUM CHLORIDE, AND CALCIUM CHLORIDE 1000 ML: .6; .31; .03; .02 INJECTION, SOLUTION INTRAVENOUS at 15:14

## 2025-08-24 RX ADMIN — HYDROCODONE BITARTRATE AND ACETAMINOPHEN 2 TABLET: 5; 325 TABLET ORAL at 16:10

## 2025-08-24 ASSESSMENT — ACTIVITIES OF DAILY LIVING (ADL)
ADLS_ACUITY_SCORE: 49
ADLS_ACUITY_SCORE: 49

## 2025-08-24 ASSESSMENT — COLUMBIA-SUICIDE SEVERITY RATING SCALE - C-SSRS
1. IN THE PAST MONTH, HAVE YOU WISHED YOU WERE DEAD OR WISHED YOU COULD GO TO SLEEP AND NOT WAKE UP?: NO
2. HAVE YOU ACTUALLY HAD ANY THOUGHTS OF KILLING YOURSELF IN THE PAST MONTH?: NO
6. HAVE YOU EVER DONE ANYTHING, STARTED TO DO ANYTHING, OR PREPARED TO DO ANYTHING TO END YOUR LIFE?: NO

## (undated) DEVICE — Device

## (undated) DEVICE — DRAPE STERI TOWEL LG 1010

## (undated) DEVICE — ESU PENCIL W/SMOKE EVAC CVPLP2000

## (undated) DEVICE — BLADE SAW SAGITTAL STRK 13X90X1.27MM HD SYS 6 6113-127-090

## (undated) DEVICE — GLOVE BIOGEL PI MICRO SZ 8.5 48585

## (undated) DEVICE — LINEN ORTHO ACL PACK 5447

## (undated) DEVICE — TOURNIQUET SGL  BLADDER 30"X4" BLUE 5921030135

## (undated) DEVICE — BAG CLEAR TRASH 1.3M 39X33" P4040C

## (undated) DEVICE — SU VICRYL 2-0 CT-1 27" UND J259H

## (undated) DEVICE — LINEN FULL SHEET 5511

## (undated) DEVICE — SET HANDPIECE INTERPULSE W/COAXIAL FAN SPRAY TIP 0210118000

## (undated) DEVICE — PACK TOTAL KNEE BOXED LATEX FREE PO15TKFCT

## (undated) DEVICE — GLOVE BIOGEL PI MICRO INDICATOR UNDERGLOVE SZ 8.5 48985

## (undated) DEVICE — PREP CHLORAPREP 26ML TINTED HI-LITE ORANGE 930815

## (undated) DEVICE — BONE CEMENT MIXEVAC III HI VAC KIT  0206-015-000

## (undated) DEVICE — BLADE SAW SAGITTAL STRK 25X90X1.27MM HD SYS 6 6125-127-090

## (undated) DEVICE — SOL NACL 0.9% IRRIG 3000ML BAG 2B7477

## (undated) DEVICE — CAST PADDING 6" STERILE 9046S

## (undated) DEVICE — SUCTION MANIFOLD NEPTUNE 2 SYS 4 PORT 0702-020-000

## (undated) DEVICE — IMM KNEE 20"

## (undated) DEVICE — GLOVE BIOGEL PI SZ 7.5 40875

## (undated) DEVICE — DECANTER VIAL 2006S

## (undated) DEVICE — SU VICRYL 1 CT-1 27" J341H

## (undated) DEVICE — DRSG GAUZE 4X4" 8044

## (undated) DEVICE — SOL NACL 0.9% INJ 1000ML BAG 2B1324X

## (undated) DEVICE — GLOVE BIOGEL PI SZ 8.5 40885

## (undated) DEVICE — GLOVE BIOGEL PI SZ 8.0 40880

## (undated) DEVICE — BLADE SAW RECIP STRK LONG 70X12.5X0.9MM 0277-096-278

## (undated) RX ORDER — PROPOFOL 10 MG/ML
INJECTION, EMULSION INTRAVENOUS
Status: DISPENSED
Start: 2022-11-15

## (undated) RX ORDER — VANCOMYCIN HYDROCHLORIDE 1 G/20ML
INJECTION, POWDER, LYOPHILIZED, FOR SOLUTION INTRAVENOUS
Status: DISPENSED
Start: 2022-11-15

## (undated) RX ORDER — FENTANYL CITRATE 50 UG/ML
INJECTION, SOLUTION INTRAMUSCULAR; INTRAVENOUS
Status: DISPENSED
Start: 2022-11-15

## (undated) RX ORDER — BUPIVACAINE HYDROCHLORIDE 5 MG/ML
INJECTION, SOLUTION EPIDURAL; INTRACAUDAL
Status: DISPENSED
Start: 2024-10-17

## (undated) RX ORDER — FENTANYL CITRATE-0.9 % NACL/PF 10 MCG/ML
PLASTIC BAG, INJECTION (ML) INTRAVENOUS
Status: DISPENSED
Start: 2022-11-15

## (undated) RX ORDER — DEXAMETHASONE SODIUM PHOSPHATE 4 MG/ML
INJECTION, SOLUTION INTRA-ARTICULAR; INTRALESIONAL; INTRAMUSCULAR; INTRAVENOUS; SOFT TISSUE
Status: DISPENSED
Start: 2022-11-15

## (undated) RX ORDER — BETAMETHASONE SODIUM PHOSPHATE AND BETAMETHASONE ACETATE 3; 3 MG/ML; MG/ML
INJECTION, SUSPENSION INTRA-ARTICULAR; INTRALESIONAL; INTRAMUSCULAR; SOFT TISSUE
Status: DISPENSED
Start: 2024-10-17

## (undated) RX ORDER — CEFAZOLIN SODIUM/WATER 2 G/20 ML
SYRINGE (ML) INTRAVENOUS
Status: DISPENSED
Start: 2022-11-15

## (undated) RX ORDER — LIDOCAINE HYDROCHLORIDE 10 MG/ML
INJECTION, SOLUTION EPIDURAL; INFILTRATION; INTRACAUDAL; PERINEURAL
Status: DISPENSED
Start: 2022-11-15

## (undated) RX ORDER — KETOROLAC TROMETHAMINE 15 MG/ML
INJECTION, SOLUTION INTRAMUSCULAR; INTRAVENOUS
Status: DISPENSED
Start: 2022-11-15

## (undated) RX ORDER — ONDANSETRON 2 MG/ML
INJECTION INTRAMUSCULAR; INTRAVENOUS
Status: DISPENSED
Start: 2022-11-15

## (undated) RX ORDER — GLYCOPYRROLATE 0.2 MG/ML
INJECTION INTRAMUSCULAR; INTRAVENOUS
Status: DISPENSED
Start: 2022-11-15